# Patient Record
Sex: MALE | Race: WHITE | NOT HISPANIC OR LATINO | Employment: UNEMPLOYED | URBAN - METROPOLITAN AREA
[De-identification: names, ages, dates, MRNs, and addresses within clinical notes are randomized per-mention and may not be internally consistent; named-entity substitution may affect disease eponyms.]

---

## 2018-09-14 ENCOUNTER — OFFICE VISIT (OUTPATIENT)
Dept: INTERNAL MEDICINE | Facility: CLINIC | Age: 38
End: 2018-09-14
Payer: COMMERCIAL

## 2018-09-14 VITALS
BODY MASS INDEX: 43.25 KG/M2 | HEART RATE: 98 BPM | WEIGHT: 315 LBS | DIASTOLIC BLOOD PRESSURE: 72 MMHG | OXYGEN SATURATION: 96 % | SYSTOLIC BLOOD PRESSURE: 110 MMHG

## 2018-09-14 DIAGNOSIS — E66.01 MORBID OBESITY (H): ICD-10-CM

## 2018-09-14 DIAGNOSIS — F10.21 HISTORY OF ALCOHOLISM (H): ICD-10-CM

## 2018-09-14 DIAGNOSIS — Z13.220 LIPID SCREENING: ICD-10-CM

## 2018-09-14 DIAGNOSIS — Z00.00 ROUTINE HISTORY AND PHYSICAL EXAMINATION OF ADULT: Primary | ICD-10-CM

## 2018-09-14 DIAGNOSIS — F41.9 ANXIETY: ICD-10-CM

## 2018-09-14 DIAGNOSIS — E88.810 DYSMETABOLIC SYNDROME X: ICD-10-CM

## 2018-09-14 RX ORDER — HYDROXYZINE HYDROCHLORIDE 50 MG/1
25 TABLET, FILM COATED ORAL
COMMUNITY
End: 2020-12-21

## 2018-09-14 RX ORDER — VENLAFAXINE HYDROCHLORIDE 150 MG/1
150 CAPSULE, EXTENDED RELEASE ORAL
COMMUNITY
Start: 2018-03-21 | End: 2020-12-21

## 2018-09-14 RX ORDER — METFORMIN HYDROCHLORIDE 750 MG/1
750 TABLET, EXTENDED RELEASE ORAL
COMMUNITY
Start: 2017-12-28 | End: 2020-12-21

## 2018-09-14 RX ORDER — LISINOPRIL 40 MG/1
40 TABLET ORAL
Status: ON HOLD | COMMUNITY
Start: 2018-03-28 | End: 2019-04-10

## 2018-09-14 RX ORDER — LORATADINE 10 MG/1
10 TABLET ORAL
COMMUNITY
Start: 2018-07-23 | End: 2020-12-21

## 2018-09-14 ASSESSMENT — PAIN SCALES - GENERAL: PAINLEVEL: NO PAIN (0)

## 2018-09-14 NOTE — PROGRESS NOTES
"[facility] [place]   Kandy Montes De Ocamanibrittany, TONY CNP  09/14/2018      Chief Complaint:   Establish Care and Physical       History of Present Illness:   Girma Trejo is a 38 year old male with a history of alcoholism and polysubstance abuse, OCD, metabolic syndrome, and hypertension, who presents alone for his annual physical as well as establish care. Overall, the patient reports that he is doing well and tolerating his medications well at this time.     Excessive sweating: The patient has noticed increased sweating over the last couple of months, mostly of his chest and head. He appreciates that he is a larger yvette and does work in a hot kitchen though he feels like he is sweating significantly more than those around him. Denies any hx of unintentional weight loss, temperature intolerance, or fevers.    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 He notes that he does get one free meal during each shift, so his diet isn't as healthy as it should be. He also notes that he is up and moving more with this new job in comparison to other jobs that he has had in the past. Over the last year, the patient has become more complacent with his physical activity regimen. He has been cutting down on portion sizes and soda intake. He reports about a 20 pound weight loss over the last 6 months by cutting down on portion sizes, but would like to see a nutritionist for further dietary management.     OCD/anxiety: The patient has been on Effexor for about the last year, experiencing great relief of his panic attacks. Feels it is a almost a \"miracle drug\" for him, as he has been on several medications previously without improvement in his symptoms. He occasionally will attend an AA meeting for support in addition to receiving support from his family and friends. He also will occasionally mediate or listen to helpful [place] Talks. In the past, the patient has not received adequate relief of his " symptoms with therapy. He denies prior suicide attempts. Overall, he is and has been in a good place for a while.     Metabolic syndrome: He is currently taking Metformin 750 mg daily. He denies current type 2 diabetes mellitus diagnosis.     Health Care Maintenance/Other:  1. Routine laboratory studies- indicated at this time   2. Tattoos- majority street, history of HIV and hepatis C screening done in the past  3. Sunscreen- not using often   4. Flu shot- discussed   5. Dental health- due at this time     Review of Systems:   Pertinent items are noted in HPI or as in patient entered ROS below, remainder of complete ROS is negative.    Answers for HPI/ROS submitted by the patient on 9/14/2018   General Symptoms: No  Skin Symptoms: No  HENT Symptoms: No  EYE SYMPTOMS: No  HEART SYMPTOMS: No  LUNG SYMPTOMS: No  INTESTINAL SYMPTOMS: No  URINARY SYMPTOMS: No  REPRODUCTIVE SYMPTOMS: No  SKELETAL SYMPTOMS: No  BLOOD SYMPTOMS: No  NERVOUS SYSTEM SYMPTOMS: No  MENTAL HEALTH SYMPTOMS: No    Active Medications:      ASPIRIN PO, Take 81 mg by mouth daily , Disp: , Rfl:      hydrOXYzine (ATARAX) 50 MG tablet, Take 25 mg by mouth, Disp: , Rfl:      lisinopril (PRINIVIL/ZESTRIL) 40 MG tablet, Take 40 mg by mouth, Disp: , Rfl:      loratadine (CLARITIN) 10 MG tablet, Take 10 mg by mouth, Disp: , Rfl:      metFORMIN (GLUCOPHAGE-XR) 750 MG 24 hr tablet, Take 750 mg by mouth, Disp: , Rfl:      venlafaxine (EFFEXOR-XR) 150 MG 24 hr capsule, Take 150 mg by mouth, Disp: , Rfl:       Allergies:   Review of patient's allergies indicates no known allergies.      Past Medical History:  Alcoholism   Ankle fracture   Anxiety   Chicken pox   Chronic chest pain   Concussion   Hypertension, goal BP <140/90  Major depression, in remission   Morbid obesity   Nasal fracture   Obsessive compulsive disorder (OCD)  Polysubstance (including opioids) dependence, binge pattern   Scalp psoriasis   Right wrist fracture   Falls   Metabolic syndrome      Past Surgical History:  Appendectomy   Nasal fracture repair   Tooth extraction with forceps   PE tubes     Family History:   Depression   Diabetes    Hypertension   Breast cancer   Alcohol/drug abuse   Stroke   Cancer unspecified   Breast cancer      Social History:   The patient is single, a social tobacco smoker (reduced a couple of years ago), an occasional marijuana user, and former methamphetamine user (quit a couple years ago). He occasionally consumes alcohol, anywhere from 2 to 6 beverages at a time. He will usually consume hard liquor or beer.  He recently moved back to the [facility] from [place]. He has been sexually active on and off with female partners using condoms.      Physical Exam:   /72  Pulse 98  Wt (!) 156.9 kg (346 lb)  SpO2 96%  BMI 43.25 kg/m2      Constitutional: no distress, comfortable, pleasant, well-groomed  Eyes: anicteric, conjunctiva pink, normal extra-ocular movements   Ears, Nose and Throat: tympanic membranes pearly gray with positive light reflex, EACs clear bilaterally, nose clear and free of lesions, throat clear, mucosa pink and moist.   Neck: supple with full range of motion, no thyromegaly.   Cardiovascular: regular rate and rhythm, normal S1 and S2, no murmurs, rubs or gallops, peripheral pulses full and symmetric  Respiratory: clear to auscultation with good air movement bilaterally, no wheezes or crackles, non-labored  Gastrointestinal: positive bowel sounds, nontender, no hepatosplenomegaly, no masses. Exam limited due to body habitus.   Musculoskeletal: full range of motion, strength 5/5, no edema   Skin: no concerning lesions or rash, no jaundice, temp normal. Multiple tattoos   Neurological: cranial nerves intact, normal strength and sensation, gait is steady with intact balance, speech is clear, no tremor   Psychological: appropriate mood, demonstrates intact judgment and logical thought process  LYMPH: no axillary, cervical,  supraclavicular, or  "infraclavicular nodes     Assessment and Plan:  Girma Trejo is a 38 year old male with a history of alcoholism and polysubstance abuse, OCD, metabolic syndrome, and hypertension, who presents for his annual physical as well as establish care.    1. Dysmetabolic syndrome X  Patient is currently taking Metformin 750 mg daily. Denies current diagnosis of type 2 diabetes mellitus, was previously told he was \"borderline\". Nutrition referral provided for further discussion on appropriate dietary guidelines to avoid developing diabetes. HbA1c ordered for repeat evaluation. Continue with current dose.  - NUTRITION REFERRAL  - Hemoglobin A1c    2. Morbid obesity (H)  Patient reports that he has lost about 20 pounds over the last 6 months by making small dietary changes. I encouraged making further dietary changes with portion control, cutting back carbohydrates, focusing on lean proteins and vegetable intake. Encouraged making salad and vegetable substitutions for fries and soups when eating at work. Nutrition referral provided for further dietary and weight loss counseling.   - NUTRITION REFERRAL    3. Routine history and physical examination of adult  CMP and lipid panel ordered for routine evaluation as patient has not recently completed laboratory studies. Patient expressed interested in getting his flu shot early. Recommended administration through his local pharmacy, as we do not have this yet in the clinic. Patient will check with local dentist and notify us if he needs a dental health referral.   - Comprehensive metabolic panel  - Lipid panel reflex to direct LDL Fasting      Here for preventive examination   Colorectal screening not indicated     Osteoporosis screening not indicated.     Labs ordered  CMP, Lipid Panel and HbA1C   Counseling was provided in the following areas:     regular exercise    weight management    healthy diet/nutrition    alcohol use     smoking cessation    4. History of alcoholism "   Patient occasionally uses tobacco and alcohol. Due to his history, I recommended trying to stay away from these substances. Encouraged continued attendance to his AA meetings.     5. Anxiety  Well controlled at this time. Patient reports tolerating Effexor very well. Patient has a good support system. Patient is not interested in repeating therapy at this time.       Follow-up: Return in about 6 months (around 3/14/2019).         Scribe Disclosure:  I, Jane Alegre, am serving as a scribe to document services personally performed by TONY Sanford CNP at this visit, based upon the provider's statements to me. All documentation has been reviewed by the aforementioned provider prior to being entered into the official medical record.     Portions of this medical record were completed by a scribe. UPON MY REVIEW AND AUTHENTICATION BY ELECTRONIC SIGNATURE, this confirms (a) I performed the applicable clinical services, and (b) the record is accurate.     TONY Sanford CNP

## 2018-09-14 NOTE — NURSING NOTE
Chief Complaint   Patient presents with     Establish Care     Pt is here to estbalish a new PCP.      Physical     Pt is here for annual physical.      Shruti Ziegler LPN at 3:09 PM on 9/14/2018.

## 2018-09-14 NOTE — PATIENT INSTRUCTIONS
[facility] Medication Refill Request Information:  * Please contact your pharmacy regarding ANY request for medication refills.  ** [facility] Prescription Fax = [phone]  * Please allow 3 business days for routine medication refills.  * Please allow 5 business days for controlled substance medication refills.     [facility] Test Result notification information:  *You will be notified with in 7-10 days of your appointment day regarding the results of your test.  If you are on MyChart you will be notified as soon as the provider has reviewed the results and signed off on them.    [facility]: [phone]     [facility] [phone]

## 2018-09-14 NOTE — MR AVS SNAPSHOT
After Visit Summary   9/14/2018    Girma Trejo    MRN: [id number]           Patient Information     Date Of Birth          1980        Visit Information        Provider Department      9/14/2018 3:15 PM Kandy Sow APRN [facility]        Today's Diagnoses     Routine history and physical examination of adult    -  1    Dysmetabolic syndrome X        Morbid obesity (H)        Lipid screening          Care Instructions    Primary Care Center Medication Refill Request Information:  * Please contact your pharmacy regarding ANY request for medication refills.  ** [facility] Prescription Fax = [phone]  * Please allow 3 business days for routine medication refills.  * Please allow 5 business days for controlled substance medication refills.     Primary Care Center Test Result notification information:  *You will be notified with in 7-10 days of your appointment day regarding the results of your test.  If you are on MyChart you will be notified as soon as the provider has reviewed the results and signed off on them.    Primary Care Center: [phone]     Lab [phone]          Follow-ups after your visit        Additional Services     NUTRITION REFERRAL       Your provider has referred you to: [facility]: [facility] (on call location)  - [place] [phone]   [email or web address]    Please be aware that coverage of these services is subject to the terms and limitations of your health insurance plan.  Call member services at your health plan with any benefit or coverage questions.      Please bring the following with you to your appointment:    (1) This referral request  (2) Any documents given to you regarding this referral  (3) Any specific questions you have about diet and/or food choices                  Follow-up notes from your care team     Return in about 6 months (around 3/14/2019).      Future tests that were ordered  for you today     Open Future Orders        Priority Expected Expires Ordered    Comprehensive metabolic panel Routine 2018    Lipid panel reflex to direct LDL Fasting Routine 2018 10/31/2018 2018    Hemoglobin A1c Routine 2018 10/31/2018 2018            Who to contact     Please call your clinic at [phone] to:    Ask questions about your health    Make or cancel appointments    Discuss your medicines    Learn about your test results    Speak to your doctor            Additional Information About Your Visit        [employer] Information     [email or web address] is an electronic gateway that provides easy, online access to your medical records. With Yummy77, you can request a clinic appointment, read your test results, renew a prescription or communicate with your care team.     To sign up for Yummy77 visit the website at [email or web address]   You will be asked to enter the access code listed below, as well as some personal information. Please follow the directions to create your username and password.     Your access code is: [email or web address]  Expires: 2018  6:31 AM     Your access code will  in 90 days. If you need help or a new code, please contact your [facility] or call [phone] for assistance.        Care EveryWhere ID     This is your Care EveryWhere ID. This could be used by other organizations to access your [place] medical records  [id number]        Your Vitals Were     Pulse Pulse Oximetry BMI (Body Mass Index)             98 96% 43.25 kg/m2          Blood Pressure from Last 3 Encounters:   18 110/72   14 129/76   14 136/80    Weight from Last 3 Encounters:   18 (!) 156.9 kg (346 lb)   14 (!) 149.5 kg (329 lb 7.6 oz)   14 (!) 151.2 kg (333 lb 6.1 oz)              We Performed the Following     NUTRITION REFERRAL          Today's Medication Changes          These changes are accurate  as of 9/14/18  3:46 PM.  If you have any questions, ask your nurse or doctor.               These medicines have changed or have updated prescriptions.        Dose/Directions    metFORMIN 750 MG 24 hr tablet   Commonly known as:  GLUCOPHAGE-XR   This may have changed:  Another medication with the same name was removed. Continue taking this medication, and follow the directions you see here.   Changed by:  Kandy Sow APRN CNP        Dose:  750 mg   Take 750 mg by mouth   Refills:  0         Stop taking these medicines if you haven't already. Please contact your care team if you have questions.     citalopram 10 MG tablet   Commonly known as:  celeXA   Stopped by:  Kandy Sow APRN CNP           cyanocobalamin 1000 MCG Tabs   Stopped by:  Kandy Sow APRN CNP           PRAVASTATIN SODIUM PO   Stopped by:  Kandy Sow APRN CNP                    Primary Care Provider Office Phone # Fax #    TONY Miller [phone]       [address] [facility] [address]        Equal Access to Services     [facility]: Hadii stephan ku hadasho Sojeannette, waaxda luqadaha, qaybta kaalmada adesudarshan, tatiana urbano . So [facility] [phone].    ATENCIÓN: Si habla español, tiene a avila disposición servicios gratuitos de asistencia lingüística. [facility] [phone].    We comply with applicable federal civil rights laws and [place] laws. We do not discriminate on the basis of race, color, national origin, age, disability, sex, sexual orientation, or gender identity.            Thank you!     Thank you for choosing [facility]  for your care. Our goal is always to provide you with excellent care. Hearing back from our patients is one way we can continue to improve our services. Please take a few minutes to complete the written survey that you may receive in the mail after your visit with us. Thank you!             Your  Updated Medication List - Protect others around you: Learn how to safely use, store and throw away your medicines at www.disposemymeds.org.          This list is accurate as of 9/14/18  3:46 PM.  Always use your most recent med list.                   Brand Name Dispense Instructions for use Diagnosis    ASPIRIN PO      Take 81 mg by mouth daily        hydrOXYzine 50 MG tablet    ATARAX     Take 25 mg by mouth        lisinopril 40 MG tablet    PRINIVIL/ZESTRIL     Take 40 mg by mouth        loratadine 10 MG tablet    CLARITIN     Take 10 mg by mouth        metFORMIN 750 MG 24 hr tablet    GLUCOPHAGE-XR     Take 750 mg by mouth        venlafaxine 150 MG 24 hr capsule    EFFEXOR-XR     Take 150 mg by mouth

## 2019-02-14 ENCOUNTER — OFFICE VISIT (OUTPATIENT)
Dept: OPHTHALMOLOGY | Facility: CLINIC | Age: 39
End: 2019-02-14
Attending: OPHTHALMOLOGY
Payer: COMMERCIAL

## 2019-02-14 DIAGNOSIS — H52.13 MYOPIA OF BOTH EYES WITH ASTIGMATISM: ICD-10-CM

## 2019-02-14 DIAGNOSIS — E11.9 DIABETES MELLITUS TYPE 2 WITHOUT RETINOPATHY (H): Primary | ICD-10-CM

## 2019-02-14 DIAGNOSIS — H52.203 MYOPIA OF BOTH EYES WITH ASTIGMATISM: ICD-10-CM

## 2019-02-14 PROCEDURE — G0463 HOSPITAL OUTPT CLINIC VISIT: HCPCS | Mod: ZF

## 2019-02-14 PROCEDURE — 92015 DETERMINE REFRACTIVE STATE: CPT | Mod: ZF

## 2019-02-14 ASSESSMENT — REFRACTION_MANIFEST
OS_CYLINDER: +0.75
OD_CYLINDER: +0.50
OS_SPHERE: -0.75
OD_SPHERE: -0.50
OS_AXIS: 120
OD_AXIS: 070

## 2019-02-14 ASSESSMENT — EXTERNAL EXAM - LEFT EYE: OS_EXAM: NORMAL

## 2019-02-14 ASSESSMENT — VISUAL ACUITY
OS_SC: 20/40
METHOD: SNELLEN - LINEAR
OD_SC: 20/20
OD_SC+: -1
OS_PH_SC: 20/25
OS_PH_SC+: +3

## 2019-02-14 ASSESSMENT — SLIT LAMP EXAM - LIDS
COMMENTS: NORMAL
COMMENTS: NORMAL

## 2019-02-14 ASSESSMENT — CUP TO DISC RATIO
OS_RATIO: 0.3
OD_RATIO: 0.3

## 2019-02-14 ASSESSMENT — TONOMETRY
OS_IOP_MMHG: 15
IOP_METHOD: TONOPEN
OD_IOP_MMHG: 16

## 2019-02-14 ASSESSMENT — CONF VISUAL FIELD
OD_NORMAL: 1
METHOD: COUNTING FINGERS
OS_NORMAL: 1

## 2019-02-14 ASSESSMENT — EXTERNAL EXAM - RIGHT EYE: OD_EXAM: NORMAL

## 2019-02-14 NOTE — PROGRESS NOTES
"CC: CEE    HPI: Girma Trejo is a 38 year old male who presents for CEE. Last eye exam was 2yrs ago. Previously had glasses but lost them. Notes eye fatigue with computer use and reading. Has recent diagnosis of DM2.     PMH:  DM2  HTN on meds  INDIO on CPAP  POHx:  Mild myopia  Current Eye Medications:   None  FH:  Paternal grandfather - \"eye cancer\"  Dad - ?glacuoma  SH:  Former smoker    Assessment & Plan   Girma Trejo is a 38 year old male with the following diagnoses:   1. Diabetes mellitus type 2 without retinopathy (H)    2. Myopia of both eyes with astigmatism       DM2 without retinopathy  -Diagnosed with T2DM in 2014, then lost weight and Diabetes mellitus went away, recently regained weight and was rediagnosed  -On metformin  -Most recent A1c 7.5 (2/11/19)  -BP/BG control  -Annual dilated eye exam    Myopia/astigmatism  -Mild  -MRx provided per patient request    RTC: 1 year CEE, sooner prn    Lorin Pinto MD   PGY-4 Ophthalmology    Teaching statement:  Complete documentation of historical and exam elements from today's encounter can be found in the full encounter summary report (not reduplicated in this progress note). I personally obtained the chief complaint(s) and history of present illness.  I confirmed and edited as necessary the review of systems, past medical/surgical history, family history, social history, and examination findings as documented by others; and I examined the patient myself. I personally reviewed the relevant tests, images, and reports as documented above.     I formulated and edited as necessary the assessment and plan and discussed the findings and management plan with the patient and family.    Kalina Bee MD  Comprehensive Ophthalmology & Ocular Pathology  Department of Ophthalmology and Visual Neurosciences  [email or web address]  Pager [phone]    "

## 2019-02-14 NOTE — NURSING NOTE
Chief Complaints and History of Present Illnesses   Patient presents with     New Patient     Chief Complaint(s) and History of Present Illness(es)     New Patient     Context: near vision    Pain scale: 0/10              Comments     New patient is here for a comprehensive eye exam.    The patient had eyeglasses two years ago.  The patient notes eye fatigue with computer and near vision.  CARLENE Stern 1:39 PM 02/14/2019

## 2019-04-04 ENCOUNTER — HOSPITAL ENCOUNTER (INPATIENT)
Facility: CLINIC | Age: 39
LOS: 4 days | Discharge: HOME | DRG: 982 | End: 2019-04-10
Attending: FAMILY MEDICINE | Admitting: EMERGENCY MEDICINE
Payer: COMMERCIAL

## 2019-04-04 ENCOUNTER — APPOINTMENT (OUTPATIENT)
Dept: GENERAL RADIOLOGY | Facility: CLINIC | Age: 39
DRG: 982 | End: 2019-04-04
Attending: FAMILY MEDICINE
Payer: COMMERCIAL

## 2019-04-04 DIAGNOSIS — L03.012 CELLULITIS OF FINGER OF LEFT HAND: ICD-10-CM

## 2019-04-04 DIAGNOSIS — I89.1 LYMPHANGITIS: ICD-10-CM

## 2019-04-04 DIAGNOSIS — L03.90 CELLULITIS, UNSPECIFIED CELLULITIS SITE: Primary | ICD-10-CM

## 2019-04-04 PROBLEM — S69.90XA FINGER INJURY: Status: ACTIVE | Noted: 2019-04-04

## 2019-04-04 LAB
ALBUMIN SERPL-MCNC: 3.9 G/DL (ref 3.4–5)
ALP SERPL-CCNC: 55 U/L (ref 40–150)
ALT SERPL W P-5'-P-CCNC: 56 U/L (ref 0–70)
ANION GAP SERPL CALCULATED.3IONS-SCNC: 12 MMOL/L (ref 3–14)
AST SERPL W P-5'-P-CCNC: 28 U/L (ref 0–45)
BASOPHILS # BLD AUTO: 0 10E9/L (ref 0–0.2)
BASOPHILS NFR BLD AUTO: 0.2 %
BILIRUB DIRECT SERPL-MCNC: 0.3 MG/DL (ref 0–0.2)
BILIRUB SERPL-MCNC: 1.5 MG/DL (ref 0.2–1.3)
BUN SERPL-MCNC: 22 MG/DL (ref 7–30)
CALCIUM SERPL-MCNC: 8.7 MG/DL (ref 8.5–10.1)
CHLORIDE SERPL-SCNC: 98 MMOL/L (ref 94–109)
CO2 SERPL-SCNC: 22 MMOL/L (ref 20–32)
CREAT SERPL-MCNC: 1.09 MG/DL (ref 0.66–1.25)
DIFFERENTIAL METHOD BLD: ABNORMAL
EOSINOPHIL # BLD AUTO: 0 10E9/L (ref 0–0.7)
EOSINOPHIL NFR BLD AUTO: 0.2 %
ERYTHROCYTE [DISTWIDTH] IN BLOOD BY AUTOMATED COUNT: 12.2 % (ref 10–15)
GFR SERPL CREATININE-BSD FRML MDRD: 85 ML/MIN/{1.73_M2}
GLUCOSE BLDC GLUCOMTR-MCNC: 223 MG/DL (ref 70–99)
GLUCOSE SERPL-MCNC: 151 MG/DL (ref 70–99)
HCT VFR BLD AUTO: 38.7 % (ref 40–53)
HGB BLD-MCNC: 13.2 G/DL (ref 13.3–17.7)
IMM GRANULOCYTES # BLD: 0 10E9/L (ref 0–0.4)
IMM GRANULOCYTES NFR BLD: 0.3 %
LACTATE BLD-SCNC: 1.7 MMOL/L (ref 0.7–2)
LYMPHOCYTES # BLD AUTO: 0.5 10E9/L (ref 0.8–5.3)
LYMPHOCYTES NFR BLD AUTO: 4.5 %
MCH RBC QN AUTO: 31.6 PG (ref 26.5–33)
MCHC RBC AUTO-ENTMCNC: 34.1 G/DL (ref 31.5–36.5)
MCV RBC AUTO: 93 FL (ref 78–100)
MONOCYTES # BLD AUTO: 0.7 10E9/L (ref 0–1.3)
MONOCYTES NFR BLD AUTO: 5.9 %
NEUTROPHILS # BLD AUTO: 10.6 10E9/L (ref 1.6–8.3)
NEUTROPHILS NFR BLD AUTO: 88.9 %
NRBC # BLD AUTO: 0 10*3/UL
NRBC BLD AUTO-RTO: 0 /100
PLATELET # BLD AUTO: 157 10E9/L (ref 150–450)
POTASSIUM SERPL-SCNC: 3.8 MMOL/L (ref 3.4–5.3)
PROT SERPL-MCNC: 7.1 G/DL (ref 6.8–8.8)
RBC # BLD AUTO: 4.18 10E12/L (ref 4.4–5.9)
SODIUM SERPL-SCNC: 132 MMOL/L (ref 133–144)
WBC # BLD AUTO: 11.9 10E9/L (ref 4–11)

## 2019-04-04 PROCEDURE — 00000146 ZZHCL STATISTIC GLUCOSE BY METER IP

## 2019-04-04 PROCEDURE — 96376 TX/PRO/DX INJ SAME DRUG ADON: CPT

## 2019-04-04 PROCEDURE — 99285 EMERGENCY DEPT VISIT HI MDM: CPT | Mod: 25

## 2019-04-04 PROCEDURE — 80076 HEPATIC FUNCTION PANEL: CPT | Performed by: EMERGENCY MEDICINE

## 2019-04-04 PROCEDURE — 25000132 ZZH RX MED GY IP 250 OP 250 PS 637: Performed by: NURSE PRACTITIONER

## 2019-04-04 PROCEDURE — 25800030 ZZH RX IP 258 OP 636: Performed by: NURSE PRACTITIONER

## 2019-04-04 PROCEDURE — G0378 HOSPITAL OBSERVATION PER HR: HCPCS

## 2019-04-04 PROCEDURE — 25000128 H RX IP 250 OP 636: Performed by: FAMILY MEDICINE

## 2019-04-04 PROCEDURE — 83605 ASSAY OF LACTIC ACID: CPT | Performed by: EMERGENCY MEDICINE

## 2019-04-04 PROCEDURE — 96361 HYDRATE IV INFUSION ADD-ON: CPT

## 2019-04-04 PROCEDURE — 73140 X-RAY EXAM OF FINGER(S): CPT | Mod: LT

## 2019-04-04 PROCEDURE — 25800030 ZZH RX IP 258 OP 636

## 2019-04-04 PROCEDURE — 96375 TX/PRO/DX INJ NEW DRUG ADDON: CPT

## 2019-04-04 PROCEDURE — 25000128 H RX IP 250 OP 636: Performed by: NURSE PRACTITIONER

## 2019-04-04 PROCEDURE — 96367 TX/PROPH/DG ADDL SEQ IV INF: CPT

## 2019-04-04 PROCEDURE — 85025 COMPLETE CBC W/AUTO DIFF WBC: CPT | Performed by: EMERGENCY MEDICINE

## 2019-04-04 PROCEDURE — 99285 EMERGENCY DEPT VISIT HI MDM: CPT | Mod: Z6 | Performed by: FAMILY MEDICINE

## 2019-04-04 PROCEDURE — 87040 BLOOD CULTURE FOR BACTERIA: CPT | Performed by: EMERGENCY MEDICINE

## 2019-04-04 PROCEDURE — 25000132 ZZH RX MED GY IP 250 OP 250 PS 637: Performed by: FAMILY MEDICINE

## 2019-04-04 PROCEDURE — 25000128 H RX IP 250 OP 636

## 2019-04-04 PROCEDURE — 96365 THER/PROPH/DIAG IV INF INIT: CPT

## 2019-04-04 PROCEDURE — 25000128 H RX IP 250 OP 636: Performed by: EMERGENCY MEDICINE

## 2019-04-04 PROCEDURE — 0X9K0ZZ DRAINAGE OF LEFT HAND, OPEN APPROACH: ICD-10-PCS | Performed by: ORTHOPAEDIC SURGERY

## 2019-04-04 PROCEDURE — 80048 BASIC METABOLIC PNL TOTAL CA: CPT | Performed by: EMERGENCY MEDICINE

## 2019-04-04 PROCEDURE — 96366 THER/PROPH/DIAG IV INF ADDON: CPT

## 2019-04-04 RX ORDER — HYDROMORPHONE HYDROCHLORIDE 1 MG/ML
0.5 INJECTION, SOLUTION INTRAMUSCULAR; INTRAVENOUS; SUBCUTANEOUS ONCE
Status: COMPLETED | OUTPATIENT
Start: 2019-04-04 | End: 2019-04-04

## 2019-04-04 RX ORDER — PIPERACILLIN SODIUM, TAZOBACTAM SODIUM 3; .375 G/15ML; G/15ML
3.38 INJECTION, POWDER, LYOPHILIZED, FOR SOLUTION INTRAVENOUS ONCE
Status: COMPLETED | OUTPATIENT
Start: 2019-04-04 | End: 2019-04-04

## 2019-04-04 RX ORDER — ONDANSETRON 4 MG/1
4 TABLET, ORALLY DISINTEGRATING ORAL EVERY 6 HOURS PRN
Status: DISCONTINUED | OUTPATIENT
Start: 2019-04-04 | End: 2019-04-10 | Stop reason: HOSPADM

## 2019-04-04 RX ORDER — OXYCODONE HYDROCHLORIDE 10 MG/1
10 TABLET ORAL ONCE
Status: COMPLETED | OUTPATIENT
Start: 2019-04-04 | End: 2019-04-04

## 2019-04-04 RX ORDER — LIDOCAINE HYDROCHLORIDE 10 MG/ML
INJECTION, SOLUTION EPIDURAL; INFILTRATION; INTRACAUDAL; PERINEURAL
Status: DISCONTINUED
Start: 2019-04-04 | End: 2019-04-04 | Stop reason: HOSPADM

## 2019-04-04 RX ORDER — ONDANSETRON 2 MG/ML
4 INJECTION INTRAMUSCULAR; INTRAVENOUS EVERY 6 HOURS PRN
Status: DISCONTINUED | OUTPATIENT
Start: 2019-04-04 | End: 2019-04-10 | Stop reason: HOSPADM

## 2019-04-04 RX ORDER — METFORMIN HYDROCHLORIDE 750 MG/1
750 TABLET, EXTENDED RELEASE ORAL
Status: DISCONTINUED | OUTPATIENT
Start: 2019-04-05 | End: 2019-04-10 | Stop reason: HOSPADM

## 2019-04-04 RX ORDER — VENLAFAXINE HYDROCHLORIDE 150 MG/1
150 CAPSULE, EXTENDED RELEASE ORAL
Status: DISCONTINUED | OUTPATIENT
Start: 2019-04-05 | End: 2019-04-10 | Stop reason: HOSPADM

## 2019-04-04 RX ORDER — PIPERACILLIN SODIUM, TAZOBACTAM SODIUM 3; .375 G/15ML; G/15ML
3.38 INJECTION, POWDER, LYOPHILIZED, FOR SOLUTION INTRAVENOUS ONCE
Status: COMPLETED | OUTPATIENT
Start: 2019-04-05 | End: 2019-04-05

## 2019-04-04 RX ORDER — LISINOPRIL 20 MG/1
40 TABLET ORAL DAILY
Status: DISCONTINUED | OUTPATIENT
Start: 2019-04-05 | End: 2019-04-10 | Stop reason: HOSPADM

## 2019-04-04 RX ORDER — HYDROMORPHONE HYDROCHLORIDE 1 MG/ML
0.5 INJECTION, SOLUTION INTRAMUSCULAR; INTRAVENOUS; SUBCUTANEOUS
Status: DISCONTINUED | OUTPATIENT
Start: 2019-04-04 | End: 2019-04-05

## 2019-04-04 RX ORDER — ACETAMINOPHEN 325 MG/1
650 TABLET ORAL EVERY 4 HOURS PRN
Status: DISCONTINUED | OUTPATIENT
Start: 2019-04-04 | End: 2019-04-10 | Stop reason: HOSPADM

## 2019-04-04 RX ORDER — ACETAMINOPHEN 500 MG
1000 TABLET ORAL ONCE
Status: COMPLETED | OUTPATIENT
Start: 2019-04-04 | End: 2019-04-04

## 2019-04-04 RX ORDER — ACETAMINOPHEN 650 MG/1
650 SUPPOSITORY RECTAL EVERY 4 HOURS PRN
Status: DISCONTINUED | OUTPATIENT
Start: 2019-04-04 | End: 2019-04-10 | Stop reason: HOSPADM

## 2019-04-04 RX ORDER — NALOXONE HYDROCHLORIDE 0.4 MG/ML
.1-.4 INJECTION, SOLUTION INTRAMUSCULAR; INTRAVENOUS; SUBCUTANEOUS
Status: DISCONTINUED | OUTPATIENT
Start: 2019-04-04 | End: 2019-04-10 | Stop reason: HOSPADM

## 2019-04-04 RX ORDER — DEXTROSE MONOHYDRATE 25 G/50ML
25-50 INJECTION, SOLUTION INTRAVENOUS
Status: DISCONTINUED | OUTPATIENT
Start: 2019-04-04 | End: 2019-04-07

## 2019-04-04 RX ORDER — SODIUM CHLORIDE 9 MG/ML
INJECTION, SOLUTION INTRAVENOUS CONTINUOUS
Status: DISCONTINUED | OUTPATIENT
Start: 2019-04-04 | End: 2019-04-09

## 2019-04-04 RX ORDER — NICOTINE POLACRILEX 4 MG
15-30 LOZENGE BUCCAL
Status: DISCONTINUED | OUTPATIENT
Start: 2019-04-04 | End: 2019-04-07

## 2019-04-04 RX ORDER — HYDROMORPHONE HYDROCHLORIDE 1 MG/ML
0.5 INJECTION, SOLUTION INTRAMUSCULAR; INTRAVENOUS; SUBCUTANEOUS
Status: COMPLETED | OUTPATIENT
Start: 2019-04-04 | End: 2019-04-04

## 2019-04-04 RX ORDER — LIDOCAINE 40 MG/G
CREAM TOPICAL
Status: DISCONTINUED | OUTPATIENT
Start: 2019-04-04 | End: 2019-04-10 | Stop reason: HOSPADM

## 2019-04-04 RX ADMIN — OXYCODONE HYDROCHLORIDE 10 MG: 10 TABLET ORAL at 23:13

## 2019-04-04 RX ADMIN — ACETAMINOPHEN 650 MG: 325 TABLET, FILM COATED ORAL at 23:13

## 2019-04-04 RX ADMIN — Medication 0.5 MG: at 23:07

## 2019-04-04 RX ADMIN — Medication 0.5 MG: at 16:36

## 2019-04-04 RX ADMIN — ACETAMINOPHEN 1000 MG: 500 TABLET, FILM COATED ORAL at 14:01

## 2019-04-04 RX ADMIN — Medication 0.5 MG: at 17:58

## 2019-04-04 RX ADMIN — SODIUM CHLORIDE: 9 INJECTION, SOLUTION INTRAVENOUS at 23:11

## 2019-04-04 RX ADMIN — Medication 0.5 MG: at 21:04

## 2019-04-04 RX ADMIN — Medication 0.5 MG: at 13:59

## 2019-04-04 RX ADMIN — PIPERACILLIN SODIUM AND TAZOBACTAM SODIUM 3.38 G: 3; .375 INJECTION, POWDER, LYOPHILIZED, FOR SOLUTION INTRAVENOUS at 23:17

## 2019-04-04 RX ADMIN — VANCOMYCIN HYDROCHLORIDE 2000 MG: 1 INJECTION, POWDER, LYOPHILIZED, FOR SOLUTION INTRAVENOUS at 14:40

## 2019-04-04 RX ADMIN — PIPERACILLIN SODIUM AND TAZOBACTAM SODIUM 3.38 G: 3; .375 INJECTION, POWDER, LYOPHILIZED, FOR SOLUTION INTRAVENOUS at 13:59

## 2019-04-04 RX ADMIN — SODIUM CHLORIDE 1000 ML: 9 INJECTION, SOLUTION INTRAVENOUS at 13:59

## 2019-04-04 ASSESSMENT — MIFFLIN-ST. JEOR: SCORE: 2372.76

## 2019-04-04 NOTE — LETTER
Transition Communication Hand-off for Care Transitions to Next Level of Care Provider    Name: Girma Trejo  : 1980  MRN #: [id number]  Primary Care Provider: [facility]     Primary Clinic: [address] [facility] [place] [address]     Reason for Hospitalization:  Lymphangitis [I89.1]  Cellulitis of finger of left hand [L03.012]  Admit Date/Time: 2019  1:20 PM  Discharge Date: 4/10/2019  Payor Source: Payor: [employer] / Plan: [employer] [place] / Product Type: HMO /     Readmission Assessment Measure (MANDY) Risk Score/category: 7/Medium         Reason for Communication Hand-off Referral: Multiple providers/specialties    Discharge Plan:  Follow up with primary care provider at [facility], within 5 days for hospital follow- up.  No follow up labs or test are needed.       Follow up with ortho if your finger isn't still improving 1 wk from discharge.               Concern for non-adherence with plan of care:   No    Follow-up specialty is recommended: Yes    Follow-up plan:  No future appointments.    Jenny North, RNCC, BSN    [facility]    Medicine Group  [address] [place] [address]    [email or web address]  [email or web address]    Office: [phone] Pager: [phone]  To contact weekend RNCC, dial * * [phone] and enter pager number [phone] at prompt. This pager can not be contacted by text page or outside line.          AVS/Discharge Summary is the source of truth; this is a helpful guide for improved communication of patient story

## 2019-04-04 NOTE — PHARMACY-VANCOMYCIN DOSING SERVICE
Pharmacy Vancomycin Initial Note  Date of Service 2019  Patient's  1980  38 year old, male    Indication: Skin and Soft Tissue Infection    Current estimated CrCl = CrCl cannot be calculated (Patient's most recent lab result is older than the maximum 90 days allowed.).    Creatinine for last 3 days  No results found for requested labs within last 72 hours.    Recent Vancomycin Level(s) for last 3 days  No results found for requested labs within last 72 hours.      Vancomycin IV Administrations (past 72 hours)      No vancomycin orders with administrations in past 72 hours.                Nephrotoxins and other renal medications (From now, onward)    Start     Dose/Rate Route Frequency Ordered Stop    19 1346  vancomycin (VANCOCIN) 2,000 mg in sodium chloride 0.9 % 500 mL intermittent infusion      2,000 mg  over 2 Hours Intravenous ONCE 19 1346      19 1337  piperacillin-tazobactam (ZOSYN) 3.375 g vial to attach to  mL bag      3.375 g  over 30 Minutes Intravenous ONCE 19 1336            Contrast Orders - past 72 hours (72h ago, onward)    None                Plan:  1.  Start vancomycin  2000 mg (~14mg/kg actual body weight; ~20 mg/kg adjusted body weigh of 101.9 kg) IV q12h.   2.  Goal Trough Level: 15-20 mg/L   3.  Pharmacy will check trough levels as appropriate in 1-3 Days.    4. Serum creatinine levels will be ordered daily for the first week of therapy and at least twice weekly for subsequent weeks.    5. [place] method utilized to dose vancomycin therapy: Method 2    Jeremi ByrdD  PGY2 Infectious Diseases Pharmacy Resident  Pager: [phone]]

## 2019-04-04 NOTE — CONSULTS
[facility], Orthopaedic Surgery Consultation    Girma Trejo MRN# [id number]   Age: 38 year old YOB: 1980     Date of Admission:  4/4/2019    Reason for consult: LEFT hand cellulitis vs flexor tenosynovitis       Requesting physician: Dr. Simon Reagan         Assessment and Plan:   Assessment:  38 year old male w/ PMH homelessness, DM, HTN, polysubstance use presenting w/ LEFT hand cellulitis on whom ortho is consulted to rule out flexor tenosynovitis of the ring finger.    On exam the patient absolutely does have cellulitis. His exam is equivocal for additional flexor tenosynovitis pathology. Will therefore trial IV ABx and re-evaluate.    Procedure: Bedside incision and drainage  Verbal consent obtained. Patient anesthetized with digital block using lidocaine. 4mm incision over dorsal L 4th finger over the middle phalanx. Bloody output was encountered, however no gross purulence was observed. Patient tolerated the procedure well.    Plan:  Medicine primary, [facility] cares  Patient receiving IV Vanc/Zosyn  Will reeval patient in AM to see if he has improvement w/ IV ABx  Most likely patient just has cellulitis with no flexor tenosynovitis  However, will have patient NPO at midnight in case his exam is worse in the AM requiring OR.    Patient discussed with Dr. Jose Juan Bui.    Wilber Hodge MD  Orthopaedic Surgery, PGY-1  Pager: [phone]            History of Present Illness:   Pt is a 38 year old w/ PMH homelessness, DM, HTN, polysubstance use presenting w/ LEFT hand cellulitis on whom ortho is consulted to rule out flexor tenosynovitis.    The patient accidentally pierced the dorsum of his left ring finger yesterday afternoon with a splinter from a piece of wood from his desk.  He attempted to remove the splinter with tweezers; he may have successfully gotten some more all of the splinter out but he is not sure.    He subsequently developed redness and pain in the left hand overnight  which awoke him from sleep.  He presented to the emergency department today for this reason.    Endorses malaise, fatigue, and dizziness.  Says he feels clammy.  Denies fevers, nausea, vomiting, abdominal pain, chest pain, shortness of breath, headache.            Past Medical History:     Past Medical History:   Diagnosis Date     Alcoholism (H)     treatment multiple times, most recently 2016     Ankle fracture as child     Anxiety      Chicken pox      Chronic chest pain     wall pain vs anxiety, several ER visits and stress test negative     Concussion 2007     Diabetes (H)      Hypertension, goal below 140/90      Major depression in remission (H) age 10    no suicide attempts, [facility], [facility]     Marijuana smoker     in [place]     Morbid obesity (H)      Nasal fracture      OCD (obsessive compulsive disorder)      Polysubstance (excluding opioids) dependence, binge pattern (H)     Prior meth abuse--treatment in 2016     Psoriasis of scalp      Smoker     1/3 ppd     Wrist fracture as child    right             Past Surgical History:     Past Surgical History:   Procedure Laterality Date     APPENDECTOMY       ENT SURGERY      nasal fx     HC TOOTH EXTRACTION W/FORCEP       PE TUBES      as a child             Social History:     Social History     Socioeconomic History     Marital status: Single     Spouse name: None     Number of children: None     Years of education: None     Highest education level: None   Occupational History     None   Social Needs     Financial resource strain: None     Food insecurity:     Worry: None     Inability: None     Transportation needs:     Medical: None     Non-medical: None   Tobacco Use     Smoking status: Former Smoker     Packs/day: 0.10     Years: 15.00     Pack years: 1.50     Smokeless tobacco: Never Used     Tobacco comment: socially, mostly quit in 2016   Substance and Sexual Activity     Alcohol use: Yes     Comment: Occasional--about 1x per week. 2-6 drinks at a time      Drug use: Yes     Types: Marijuana     Comment: occasional marijuana--once every 3 or 4 months, prior meth abuse     Sexual activity: Not Currently     Partners: Female     Birth control/protection: Condom   Lifestyle     Physical activity:     Days per week: None     Minutes per session: None     Stress: None   Relationships     Social connections:     Talks on phone: None     Gets together: None     Attends Mu-ism service: None     Active member of club or organization: None     Attends meetings of clubs or organizations: None     Relationship status: None     Intimate partner violence:     Fear of current or ex partner: None     Emotionally abused: None     Physically abused: None     Forced sexual activity: None   Other Topics Concern     Parent/sibling w/ CABG, MI or angioplasty before 65F 55M? 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 No family hx of      Cerebrovascular Disease No family hx of      Cancer - colorectal No family hx of      Prostate Cancer No family hx of      Allergies No family hx of      Alzheimer Disease No family hx of      Anesthesia Reaction No family hx of      Arthritis No family hx of      Blood Disease No family hx of      Circulatory No family hx of      Congenital Anomalies No family hx of      Connective Tissue Disorder No family hx of      Endocrine Disease No family hx of      Eye Disorder No family hx of      Gastrointestinal Disease No family hx of      Genitourinary Problems No family hx of       "Gynecology No family hx of      Lipids No family hx of      Musculoskeletal Disorder No family hx of      Neurologic Disorder No family hx of      Obesity No family hx of      Osteoporosis No family hx of      Respiratory No family hx of      Thyroid Disease No family hx of               Medications:     Current Facility-Administered Medications   Medication     [START ON 4/5/2019] vancomycin (VANCOCIN) 2,000 mg in sodium chloride 0.9 % 500 mL intermittent infusion     Current Outpatient Medications   Medication Sig     ASPIRIN PO Take 81 mg by mouth daily      hydrOXYzine (ATARAX) 50 MG tablet Take 25 mg by mouth     loratadine (CLARITIN) 10 MG tablet Take 10 mg by mouth     metFORMIN (GLUCOPHAGE-XR) 750 MG 24 hr tablet Take 750 mg by mouth     venlafaxine (EFFEXOR-XR) 150 MG 24 hr capsule Take 150 mg by mouth             Allergies:      Allergies   Allergen Reactions     No Clinical Screening - See Comments      Other reaction(s): Runny Nose     Vilazodone Other (See Comments)     Blurred vision, racing heart            Review of Systems:   Per HPI           Physical Exam:   COMPLETE EXAMINATION:   VITAL SIGNS: /63   Pulse 96   Temp 99  F (37.2  C) (Oral)   Resp 20   Ht 1.778 m (5' 10\")   Wt 145.2 kg (320 lb)   SpO2 98%   BMI 45.92 kg/m    RESP: Non labored breathing on RA  CV: Extr wwp  MUSCULOSKELETAL:  Focal examination of the left upper extremity demonstrates an erythematous and swollen and on inspection.  There is a break in the skin over the dorsum of the middle phalanx from the splinter.  The left ring finger is swollen from approximately the middle phalanx and proximally to the dorsum of the hand all the way to the carpus.  The patient is markedly tender to palpation over the finger and NTTP over the hands.  His tenderness is worse on the volar aspect of his ring finger.  His volar aspect of his finger is full from the distal phalanx to proximal phalanx.  The dorsum of his finger is a little " bit softer.  NTTP over all other fingers.  Fires EPL, FPL, intrinsics.  SILT over median, radial, ulnar nerve distributions.  2+ palpable radial pulse.             Data:   All pertinent laboratory data reviewed  All imaging studies reviewed by me.    Recent Labs   Lab Test 04/04/19  1325 06/19/14  1517 05/23/14  0713   HGB 13.2* 14.9 14.7   WBC 11.9* 8.0 4.4     No results for input(s): FTYP, FNEU, FOTH, FCOL, FAPR, FWBC in the last 94418 hours.    Imaging:  Fingers Xr, 2-3 Views, Left    Result Date: 4/4/2019  Exam: XR FINGER LT G/E 2 VW, 4/4/2019 2:31 PM Indication: ? fb dorsum left 4th finger with now cellulitis Comparison: None Findings: 3 radiographic views focused on the fourth digit. No displaced fracture. No substantial degenerative changes. No radiopaque body. Soft tissue swelling of the fourth digit.     Impression: Soft tissue swelling of the fourth digit. No radiopaque foreign body.    Signed:    Patient discussed with staff physician, Dr. Jose Juan Bui.    Wilber Hodge MD  Orthopaedic Surgery, PGY-1  Pager: [phone]    Attending MD (Dr. Jaycob Buckley) Attestation: I reviewed the resident's note and agree with the documented findings and plan of care.      MD Ruperto Otoole Family Professor  Oncology and Adult Reconstructive Surgery  Dept Orthopaedic Surgery, [facility]  [phone] office, [phone] pager  [email or web address]

## 2019-04-04 NOTE — ED PROVIDER NOTES
karlie    [place] EMERGENCY DEPARTMENT ([facility])  4/04/19 [facility] 1:30 PM   History     Chief Complaint   Patient presents with     finger problem     The history is provided by the patient and medical records.     Girma Trejo is a 38 year old male who presents with red, swollen, and painful left ring finger for the past few days.  He has a history of type 2 diabetes, metabolic syndrome and hypertension.  Patient states symptoms after he got a a splinter in his finger, possibly from his desk. It was in his finger for a few days and developed a small [place] of redness. The finger became profoundly swollen, red and severely painful. He is no longer able to bend his finger. The redness has spread up his arm. He notes feeling sick this morning with diaphoresis. He wonders if he could have some water. He feels panicky at this time. No known drug allergies.     Per [facility] records, his last Tdap was in 2014.    I have reviewed the Medications, Allergies, Past Medical and Surgical History, and Social History in the [employer] system.  PAST MEDICAL HISTORY:   Past Medical History:   Diagnosis Date     Alcoholism (H)     treatment multiple times, most recently 2016     Ankle fracture as child     Anxiety      Chicken pox      Chronic chest pain     wall pain vs anxiety, several ER visits and stress test negative     Concussion 2007     Diabetes (H)      Hypertension, goal below 140/90      Major depression in remission (H) age 10    no suicide attempts, [facility], [facility]     Marijuana smoker     in [place]     Morbid obesity (H)      Nasal fracture      OCD (obsessive compulsive disorder)      Polysubstance (excluding opioids) dependence, binge pattern (H)     Prior meth abuse--treatment in 2016     Psoriasis of scalp      Smoker     1/3 ppd     Wrist fracture as child    right       PAST SURGICAL HISTORY:   Past Surgical History:   Procedure Laterality Date     APPENDECTOMY       ENT SURGERY      nasal fx     HC TOOTH EXTRACTION  W/FORCEP       PE TUBES      as a child       FAMILY HISTORY:   Family History   Problem Relation Age of Onset     Depression Mother      Heart Surgery Mother         aortic valve abnormality     Hypertension Father      Pre-Diabetes Father      Diabetes Maternal Aunt      Diabetes Paternal Grandmother      Breast Cancer Maternal Aunt      Alcohol/Drug Maternal Uncle         and 1st cousin     Cancer Paternal Grandfather      Asthma No family hx of      C.A.D. No family hx of      Cerebrovascular Disease No family hx of      Cancer - colorectal No family hx of      Prostate Cancer No family hx of      Allergies No family hx of      Alzheimer Disease No family hx of      Anesthesia Reaction No family hx of      Arthritis No family hx of      Blood Disease No family hx of      Circulatory No family hx of      Congenital Anomalies No family hx of      Connective Tissue Disorder No family hx of      Endocrine Disease No family hx of      Eye Disorder No family hx of      Gastrointestinal Disease No family hx of      Genitourinary Problems No family hx of      Gynecology No family hx of      Lipids No family hx of      Musculoskeletal Disorder No family hx of      Neurologic Disorder No family hx of      Obesity No family hx of      Osteoporosis No family hx of      Respiratory No family hx of      Thyroid Disease No family hx of        SOCIAL HISTORY:   Social History     Tobacco Use     Smoking status: Former Smoker     Packs/day: 0.10     Years: 15.00     Pack years: 1.50     Smokeless tobacco: Never Used     Tobacco comment: socially, mostly quit in 2016   Substance Use Topics     Alcohol use: Yes     Comment: Occasional--about 1x per week. 2-6 drinks at a time       Current Discharge Medication List      CONTINUE these medications which have NOT CHANGED    Details   ASPIRIN PO Take 81 mg by mouth daily       hydrOXYzine (ATARAX) 50 MG tablet Take 25 mg by mouth      loratadine (CLARITIN) 10 MG tablet Take 10 mg by  "mouth      metFORMIN (GLUCOPHAGE-XR) 750 MG 24 hr tablet Take 750 mg by mouth      venlafaxine (EFFEXOR-XR) 150 MG 24 hr capsule Take 150 mg by mouth         STOP taking these medications       lisinopril (PRINIVIL/ZESTRIL) 40 MG tablet Comments:   Reason for Stopping:                  Allergies   Allergen Reactions     No Clinical Screening - See Comments      Other reaction(s): Runny Nose     Vilazodone Other (See Comments)     Blurred vision, racing heart      Review of Systems   Constitutional: Positive for activity change. Negative for fever.        Generalized malaise along with decreased range of motion of his left fourth finger because of swelling and pain and redness.   HENT: Negative for congestion and trouble swallowing.    Eyes: Negative for redness and visual disturbance.   Respiratory: Negative for cough and shortness of breath.    Cardiovascular: Negative for chest pain.   Gastrointestinal: Negative for abdominal pain, nausea and vomiting.   Genitourinary: Negative for difficulty urinating.   Musculoskeletal: Positive for myalgias. Negative for arthralgias and neck stiffness.        Left ring finger swelling and redness   Skin: Positive for rash and wound. Negative for color change.        Left fourth finger with marked swelling erythema with redness streaking up the top of his left forearm.  The dorsum of his mid phalanx on the left fourth finger area of questionable splinter was attempted to be removed a few days ago by patient himself.   Neurological: Negative for weakness, numbness and headaches.   Hematological: Does not bruise/bleed easily.   Psychiatric/Behavioral: Positive for decreased concentration and dysphoric mood. Negative for confusion. The patient is nervous/anxious.    All other systems reviewed and are negative.      Physical Exam   BP: 143/85  Pulse: 89  Temp: 99  F (37.2  C)  Resp: 20  Height: 177.8 cm (5' 10\")  Weight: 145.2 kg (320 lb)  SpO2: 99 %      Physical Exam "   Constitutional: He is oriented to person, place, and time. He appears well-developed and well-nourished. He appears distressed.   Patient mild distress is anxious here but able to be redirected.  He is uncomfortable because of the pain.  Vital signs still stable.   HENT:   Head: Normocephalic and atraumatic.   Eyes: Conjunctivae and EOM are normal. Pupils are equal, round, and reactive to light. No scleral icterus.   Neck: Normal range of motion. Neck supple.   Cardiovascular: Normal rate and regular rhythm.   Pulmonary/Chest: No stridor. No respiratory distress.   Abdominal: There is no tenderness. There is no guarding.   Musculoskeletal: He exhibits edema and tenderness.        Hands:  Left fourth finger noted marked swelling throughout with limited range of motion difficult to flex finger at all because of pain.  No vascular compromise distally.  Lymphogenic spread noted over the dorsum of the left arm MCC up the forearm initially from the erythematous swollen left fourth finger. NO Crepitus noted.   Neurological: He is alert and oriented to person, place, and time.   Skin: Skin is warm and dry. Capillary refill takes less than 2 seconds. Rash noted. He is not diaphoretic. There is erythema.   Left fourth finger with lymphangitic spread noted   Psychiatric:   Patient uncomfortable because the pain somewhat anxious here but can be redirected.   Nursing note and vitals reviewed.      ED Course        Procedures         Patient evaluated ER.  IV established liter normal saline given.  Dilaudid titrated for pain which did help.  Patient in the ER after blood cultures drawn laboratory testing noted white count 11,000.  Lactic acid 1.7.  Glucose to be 131.  X-rays done no foreign body or subcu air seen.  Orthopedic consultation.  We ordered vancomycin and Zosyn IV right away treated for infectious cause.  And was elevated seen by orthopedics in the ER they attempted to do incision and drainage of the dorsum of  the left fourth finger but no purulent drainage noted.  Feel at this point less likely that this is a flexor tenosynovitis.  More likely cellulitis continue management will continue to follow in the ED observation overnight.  Patient agrees with plan admitted to ED observation for acute left fourth finger cellulitis continue to monitor by orthopedics for potential flexor tenosynovitis patient be kept n.p.o. after midnight.  Continue antibiotics along with pain control etc.  Patient and family agree with plan.       Critical Care time:  none             Labs Ordered and Resulted from Time of ED Arrival Up to the Time of Departure from the ED   CBC WITH PLATELETS DIFFERENTIAL - Abnormal; Notable for the following components:       Result Value    WBC 11.9 (*)     RBC Count 4.18 (*)     Hemoglobin 13.2 (*)     Hematocrit 38.7 (*)     Absolute Neutrophil 10.6 (*)     Absolute Lymphocytes 0.5 (*)     All other components within normal limits   BASIC METABOLIC PANEL - Abnormal; Notable for the following components:    Sodium 132 (*)     Glucose 151 (*)     All other components within normal limits   HEPATIC PANEL - Abnormal; Notable for the following components:    Bilirubin Direct 0.3 (*)     Bilirubin Total 1.5 (*)     All other components within normal limits   GLUCOSE BY METER - Abnormal; Notable for the following components:    Glucose 223 (*)     All other components within normal limits   LACTIC ACID WHOLE BLOOD     Results for orders placed or performed during the hospital encounter of 04/04/19   Fingers XR, 2-3 views, left    Narrative    Exam: XR FINGER LT G/E 2 VW, 4/4/2019 2:31 PM    Indication: ? fb dorsum left 4th finger with now cellulitis    Comparison: None    Findings:   3 radiographic views focused on the fourth digit.    No displaced fracture. No substantial degenerative changes. No  radiopaque body. Soft tissue swelling of the fourth digit.      Impression    Impression: Soft tissue swelling of the  fourth digit. No radiopaque  foreign body.    NESTOR JACK MD   CBC with platelets differential   Result Value Ref Range    WBC 11.9 (H) 4.0 - 11.0 10e9/L    RBC Count 4.18 (L) 4.4 - 5.9 10e12/L    Hemoglobin 13.2 (L) 13.3 - 17.7 g/dL    Hematocrit 38.7 (L) 40.0 - 53.0 %    MCV 93 78 - 100 fl    MCH 31.6 26.5 - 33.0 pg    MCHC 34.1 31.5 - 36.5 g/dL    RDW 12.2 10.0 - 15.0 %    Platelet Count 157 150 - 450 10e9/L    Diff Method Automated Method     % Neutrophils 88.9 %    % Lymphocytes 4.5 %    % Monocytes 5.9 %    % Eosinophils 0.2 %    % Basophils 0.2 %    % Immature Granulocytes 0.3 %    Nucleated RBCs 0 0 /100    Absolute Neutrophil 10.6 (H) 1.6 - 8.3 10e9/L    Absolute Lymphocytes 0.5 (L) 0.8 - 5.3 10e9/L    Absolute Monocytes 0.7 0.0 - 1.3 10e9/L    Absolute Eosinophils 0.0 0.0 - 0.7 10e9/L    Absolute Basophils 0.0 0.0 - 0.2 10e9/L    Abs Immature Granulocytes 0.0 0 - 0.4 10e9/L    Absolute Nucleated RBC 0.0    Lactic acid   Result Value Ref Range    Lactic Acid 1.7 0.7 - 2.0 mmol/L   Basic metabolic panel   Result Value Ref Range    Sodium 132 (L) 133 - 144 mmol/L    Potassium 3.8 3.4 - 5.3 mmol/L    Chloride 98 94 - 109 mmol/L    Carbon Dioxide 22 20 - 32 mmol/L    Anion Gap 12 3 - 14 mmol/L    Glucose 151 (H) 70 - 99 mg/dL    Urea Nitrogen 22 7 - 30 mg/dL    Creatinine 1.09 0.66 - 1.25 mg/dL    GFR Estimate 85 >60 mL/min/[1.73_m2]    GFR Estimate If Black >90 >60 mL/min/[1.73_m2]    Calcium 8.7 8.5 - 10.1 mg/dL   Hepatic panel   Result Value Ref Range    Bilirubin Direct 0.3 (H) 0.0 - 0.2 mg/dL    Bilirubin Total 1.5 (H) 0.2 - 1.3 mg/dL    Albumin 3.9 3.4 - 5.0 g/dL    Protein Total 7.1 6.8 - 8.8 g/dL    Alkaline Phosphatase 55 40 - 150 U/L    ALT 56 0 - 70 U/L    AST 28 0 - 45 U/L   Glucose by meter   Result Value Ref Range    Glucose 223 (H) 70 - 99 mg/dL   CBC with platelets   Result Value Ref Range    WBC 5.8 4.0 - 11.0 10e9/L    RBC Count 4.05 (L) 4.4 - 5.9 10e12/L    Hemoglobin 12.8 (L) 13.3 - 17.7  g/dL    Hematocrit 38.7 (L) 40.0 - 53.0 %    MCV 96 78 - 100 fl    MCH 31.6 26.5 - 33.0 pg    MCHC 33.1 31.5 - 36.5 g/dL    RDW 12.3 10.0 - 15.0 %    Platelet Count 116 (L) 150 - 450 10e9/L   Basic metabolic panel   Result Value Ref Range    Sodium 139 133 - 144 mmol/L    Potassium 3.8 3.4 - 5.3 mmol/L    Chloride 108 94 - 109 mmol/L    Carbon Dioxide 24 20 - 32 mmol/L    Anion Gap 7 3 - 14 mmol/L    Glucose 131 (H) 70 - 99 mg/dL    Urea Nitrogen 15 7 - 30 mg/dL    Creatinine 1.09 0.66 - 1.25 mg/dL    GFR Estimate 85 >60 mL/min/[1.73_m2]    GFR Estimate If Black >90 >60 mL/min/[1.73_m2]    Calcium 8.5 8.5 - 10.1 mg/dL   Glucose by meter   Result Value Ref Range    Glucose 139 (H) 70 - 99 mg/dL   Glucose by meter   Result Value Ref Range    Glucose 175 (H) 70 - 99 mg/dL   Blood culture   Result Value Ref Range    Specimen Description Blood Right Arm     Special Requests Received in aerobic bottle only     Culture Micro No growth after 16 hours             Assessments & Plan (with Medical Decision Making)  38-year-old male with diabetes.  Patient try to remove a splinter a few days ago on the dorsum of his left finger.  Now the fingers are swollen red and with some streaking up his arm.  He has lymphatic spread.  There is no crepitus noted no vascular compromise unable to flex at this point orthopedic consultation unclear if this is truly flexor tenosynovitis they attempted to open the dorsum of the finger no purulent drainage noted.  Patient received Zosyn and vancomycin IV in the ER does not have history of MRSA.  X-rays did not show foreign body or subcu air or other abnormalities.  White count was 11,000 glucose 131 lactic acid 1.7.  Feel at this point differential includes cellulitis plus or minus flexor tenosynovitis is unclear per orthopedics will be admitted ED observation overnight for pain control ongoing IV antibiotics and reassessment by orthopedics patient be kept n.p.o. at midnight family agrees with  plan patient stable admitted.         I have reviewed the nursing notes.    I have reviewed the findings, diagnosis, plan and need for follow up with the patient.       Medication List      ASK your doctor about these medications    lisinopril 40 MG tablet  Commonly known as:  PRINIVIL/ZESTRIL  Ask about: Should I take this medication?            Final diagnoses:   Cellulitis of finger of left hand   Lymphangitis     I, Meseret Rodriguez, am serving as a trained medical scribe to document services personally performed by Simon Reagan MD based on the provider's statements to me on April 4, 2019.  This document has been checked and approved by the attending provider.    ISimon MD, was physically present and have reviewed and verified the accuracy of this note documented by Meseret Rodriguez, medical scribe.     4/4/2019   [facility] [place], EMERGENCY DEPARTMENT     Simon Reagan MD  04/05/19 0753

## 2019-04-04 NOTE — ED TRIAGE NOTES
Left ring finger was red yesterday, he pulled a sliver out of his finger last night, this morning finger is swollen and red. Redness is moving onto his hand

## 2019-04-05 ENCOUNTER — APPOINTMENT (OUTPATIENT)
Dept: ULTRASOUND IMAGING | Facility: CLINIC | Age: 39
DRG: 982 | End: 2019-04-05
Attending: NURSE PRACTITIONER
Payer: COMMERCIAL

## 2019-04-05 LAB
ANION GAP SERPL CALCULATED.3IONS-SCNC: 7 MMOL/L (ref 3–14)
BUN SERPL-MCNC: 15 MG/DL (ref 7–30)
CALCIUM SERPL-MCNC: 8.5 MG/DL (ref 8.5–10.1)
CHLORIDE SERPL-SCNC: 108 MMOL/L (ref 94–109)
CO2 SERPL-SCNC: 24 MMOL/L (ref 20–32)
CREAT SERPL-MCNC: 1.09 MG/DL (ref 0.66–1.25)
CRP SERPL-MCNC: 100 MG/L (ref 0–8)
ERYTHROCYTE [DISTWIDTH] IN BLOOD BY AUTOMATED COUNT: 12.3 % (ref 10–15)
ERYTHROCYTE [SEDIMENTATION RATE] IN BLOOD BY WESTERGREN METHOD: 13 MM/H (ref 0–15)
GFR SERPL CREATININE-BSD FRML MDRD: 85 ML/MIN/{1.73_M2}
GLUCOSE BLDC GLUCOMTR-MCNC: 139 MG/DL (ref 70–99)
GLUCOSE BLDC GLUCOMTR-MCNC: 139 MG/DL (ref 70–99)
GLUCOSE BLDC GLUCOMTR-MCNC: 175 MG/DL (ref 70–99)
GLUCOSE SERPL-MCNC: 131 MG/DL (ref 70–99)
HCT VFR BLD AUTO: 38.7 % (ref 40–53)
HGB BLD-MCNC: 12.8 G/DL (ref 13.3–17.7)
MCH RBC QN AUTO: 31.6 PG (ref 26.5–33)
MCHC RBC AUTO-ENTMCNC: 33.1 G/DL (ref 31.5–36.5)
MCV RBC AUTO: 96 FL (ref 78–100)
PLATELET # BLD AUTO: 116 10E9/L (ref 150–450)
POTASSIUM SERPL-SCNC: 3.8 MMOL/L (ref 3.4–5.3)
RBC # BLD AUTO: 4.05 10E12/L (ref 4.4–5.9)
SODIUM SERPL-SCNC: 139 MMOL/L (ref 133–144)
WBC # BLD AUTO: 5.8 10E9/L (ref 4–11)

## 2019-04-05 PROCEDURE — 25800030 ZZH RX IP 258 OP 636: Performed by: NURSE PRACTITIONER

## 2019-04-05 PROCEDURE — 40000275 ZZH STATISTIC RCP TIME EA 10 MIN

## 2019-04-05 PROCEDURE — 25800030 ZZH RX IP 258 OP 636

## 2019-04-05 PROCEDURE — G0378 HOSPITAL OBSERVATION PER HR: HCPCS

## 2019-04-05 PROCEDURE — 25000128 H RX IP 250 OP 636: Performed by: NURSE PRACTITIONER

## 2019-04-05 PROCEDURE — 96366 THER/PROPH/DIAG IV INF ADDON: CPT

## 2019-04-05 PROCEDURE — 85652 RBC SED RATE AUTOMATED: CPT | Performed by: NURSE PRACTITIONER

## 2019-04-05 PROCEDURE — 25000125 ZZHC RX 250: Performed by: ORTHOPAEDIC SURGERY

## 2019-04-05 PROCEDURE — 85027 COMPLETE CBC AUTOMATED: CPT | Performed by: NURSE PRACTITIONER

## 2019-04-05 PROCEDURE — 96376 TX/PRO/DX INJ SAME DRUG ADON: CPT

## 2019-04-05 PROCEDURE — 86140 C-REACTIVE PROTEIN: CPT | Performed by: NURSE PRACTITIONER

## 2019-04-05 PROCEDURE — 36415 COLL VENOUS BLD VENIPUNCTURE: CPT | Performed by: NURSE PRACTITIONER

## 2019-04-05 PROCEDURE — 10140 I&D HMTMA SEROMA/FLUID COLLJ: CPT

## 2019-04-05 PROCEDURE — 80048 BASIC METABOLIC PNL TOTAL CA: CPT | Performed by: NURSE PRACTITIONER

## 2019-04-05 PROCEDURE — 76882 US LMTD JT/FCL EVL NVASC XTR: CPT | Mod: LT

## 2019-04-05 PROCEDURE — 25000132 ZZH RX MED GY IP 250 OP 250 PS 637: Performed by: NURSE PRACTITIONER

## 2019-04-05 PROCEDURE — 00000146 ZZHCL STATISTIC GLUCOSE BY METER IP

## 2019-04-05 PROCEDURE — 96375 TX/PRO/DX INJ NEW DRUG ADDON: CPT

## 2019-04-05 PROCEDURE — 25000128 H RX IP 250 OP 636

## 2019-04-05 PROCEDURE — 0JDK0ZZ EXTRACTION OF LEFT HAND SUBCUTANEOUS TISSUE AND FASCIA, OPEN APPROACH: ICD-10-PCS | Performed by: ORTHOPAEDIC SURGERY

## 2019-04-05 RX ORDER — PIPERACILLIN SODIUM, TAZOBACTAM SODIUM 3; .375 G/15ML; G/15ML
3.38 INJECTION, POWDER, LYOPHILIZED, FOR SOLUTION INTRAVENOUS EVERY 6 HOURS
Status: DISCONTINUED | OUTPATIENT
Start: 2019-04-05 | End: 2019-04-09

## 2019-04-05 RX ORDER — OXYCODONE HYDROCHLORIDE 5 MG/1
5-10 TABLET ORAL EVERY 6 HOURS PRN
Status: DISCONTINUED | OUTPATIENT
Start: 2019-04-05 | End: 2019-04-06

## 2019-04-05 RX ORDER — DEXTROSE MONOHYDRATE 25 G/50ML
25-50 INJECTION, SOLUTION INTRAVENOUS
Status: DISCONTINUED | OUTPATIENT
Start: 2019-04-05 | End: 2019-04-10 | Stop reason: HOSPADM

## 2019-04-05 RX ORDER — LIDOCAINE HYDROCHLORIDE 20 MG/ML
40 INJECTION, SOLUTION INFILTRATION; PERINEURAL ONCE
Status: COMPLETED | OUTPATIENT
Start: 2019-04-05 | End: 2019-04-05

## 2019-04-05 RX ORDER — HYDROMORPHONE HCL/0.9% NACL/PF 0.2MG/0.2
0.2 SYRINGE (ML) INTRAVENOUS
Status: COMPLETED | OUTPATIENT
Start: 2019-04-05 | End: 2019-04-05

## 2019-04-05 RX ORDER — HYDROXYZINE HYDROCHLORIDE 25 MG/1
25 TABLET, FILM COATED ORAL 3 TIMES DAILY PRN
Status: DISCONTINUED | OUTPATIENT
Start: 2019-04-05 | End: 2019-04-10 | Stop reason: HOSPADM

## 2019-04-05 RX ORDER — NICOTINE POLACRILEX 4 MG
15-30 LOZENGE BUCCAL
Status: DISCONTINUED | OUTPATIENT
Start: 2019-04-05 | End: 2019-04-10 | Stop reason: HOSPADM

## 2019-04-05 RX ADMIN — ACETAMINOPHEN 650 MG: 325 TABLET, FILM COATED ORAL at 02:22

## 2019-04-05 RX ADMIN — OXYCODONE HYDROCHLORIDE 10 MG: 5 TABLET ORAL at 18:39

## 2019-04-05 RX ADMIN — PIPERACILLIN AND TAZOBACTAM 3.38 G: 3; .375 INJECTION, POWDER, FOR SOLUTION INTRAVENOUS at 06:01

## 2019-04-05 RX ADMIN — Medication 0.2 MG: at 11:31

## 2019-04-05 RX ADMIN — METFORMIN HYDROCHLORIDE 750 MG: 750 TABLET, EXTENDED RELEASE ORAL at 18:39

## 2019-04-05 RX ADMIN — Medication 0.2 MG: at 08:33

## 2019-04-05 RX ADMIN — LISINOPRIL 40 MG: 20 TABLET ORAL at 08:27

## 2019-04-05 RX ADMIN — VANCOMYCIN HYDROCHLORIDE 2000 MG: 10 INJECTION, POWDER, LYOPHILIZED, FOR SOLUTION INTRAVENOUS at 03:25

## 2019-04-05 RX ADMIN — ACETAMINOPHEN 650 MG: 325 TABLET, FILM COATED ORAL at 20:00

## 2019-04-05 RX ADMIN — ACETAMINOPHEN 650 MG: 325 TABLET, FILM COATED ORAL at 08:26

## 2019-04-05 RX ADMIN — Medication 0.5 MG: at 03:31

## 2019-04-05 RX ADMIN — ONDANSETRON 4 MG: 2 INJECTION INTRAMUSCULAR; INTRAVENOUS at 08:22

## 2019-04-05 RX ADMIN — PIPERACILLIN AND TAZOBACTAM 3.38 G: 3; .375 INJECTION, POWDER, FOR SOLUTION INTRAVENOUS at 13:10

## 2019-04-05 RX ADMIN — Medication 0.5 MG: at 06:31

## 2019-04-05 RX ADMIN — VANCOMYCIN HYDROCHLORIDE 2000 MG: 10 INJECTION, POWDER, LYOPHILIZED, FOR SOLUTION INTRAVENOUS at 15:43

## 2019-04-05 RX ADMIN — LIDOCAINE HYDROCHLORIDE 40 MG: 20 INJECTION, SOLUTION INFILTRATION; PERINEURAL at 11:06

## 2019-04-05 RX ADMIN — ACETAMINOPHEN 650 MG: 325 TABLET, FILM COATED ORAL at 13:10

## 2019-04-05 RX ADMIN — OXYCODONE HYDROCHLORIDE 10 MG: 5 TABLET ORAL at 11:02

## 2019-04-05 RX ADMIN — VENLAFAXINE HYDROCHLORIDE 150 MG: 150 CAPSULE, EXTENDED RELEASE ORAL at 08:26

## 2019-04-05 RX ADMIN — SODIUM CHLORIDE: 9 INJECTION, SOLUTION INTRAVENOUS at 15:08

## 2019-04-05 RX ADMIN — PIPERACILLIN AND TAZOBACTAM 3.38 G: 3; .375 INJECTION, POWDER, FOR SOLUTION INTRAVENOUS at 20:08

## 2019-04-05 ASSESSMENT — ENCOUNTER SYMPTOMS
BRUISES/BLEEDS EASILY: 0
HEADACHES: 0
NUMBNESS: 0
NAUSEA: 0
WEAKNESS: 0
FEVER: 0
ARTHRALGIAS: 0
CONFUSION: 0
COLOR CHANGE: 0
DIFFICULTY URINATING: 0
NECK STIFFNESS: 0
DECREASED CONCENTRATION: 1
WOUND: 1
EYE REDNESS: 0
NERVOUS/ANXIOUS: 1
MYALGIAS: 1
ABDOMINAL PAIN: 0
DYSPHORIC MOOD: 1
ACTIVITY CHANGE: 1
TROUBLE SWALLOWING: 0
COUGH: 0
SHORTNESS OF BREATH: 0
VOMITING: 0

## 2019-04-05 NOTE — PROGRESS NOTES
"Orthopaedic Surgery Progress Note   April 5, 2019    Subjective: No acute events overnight. Pain remains the same. Slight improvement of dorsal hand erythema NPO since midnight. Denies fever or chills, CP, SOB, numbness or tingling, motor dysfunction or weakness.     Objective: /69 (BP Location: Right arm)   Pulse 88   Temp 97.4  F (36.3  C) (Oral)   Resp 18   Ht 1.778 m (5' 10\")   Wt 145.2 kg (320 lb)   SpO2 98%   BMI 45.92 kg/m      General: NAD, alert and oriented, cooperative with exam.  Cardio: extremities wwp.   Respiratory: Non-labored breathing on room air.  MSK:   LUE:   Erythema and swelling appear same as yesterday on the ring finger, slightly improved on the hand dorsum.  Dorsal skin incision healing with minimal drainage.  Left ring finger is swollen from approximately the middle phalanx and proximally to the dorsum of the hand all the way to the carpus.  The patient is markedly tender to palpation over the finger and NTTP over the hands..  His tenderness is worse on the volar aspect of his ring finger.  His volar aspect of his finger is full from the distal phalanx to proximal phalanx. The dorsum of his finger is a little bit softer.     Fingers wwp, radial pulse palpable  Fires FPL/EPL/intrinsics  SILT in r/u/m nerve distributions    Labs:  Hemoglobin   Date Value Ref Range Status   04/04/2019 13.2 (L) 13.3 - 17.7 g/dL Final   ]  All cultures:  Recent Labs   Lab 04/04/19  1325   CULT No growth after 14 hours       Imaging: US pending to look for fluid collection    Assessment:   38 year old male w/ PMH homelessness, DM, HTN, polysubstance use presenting w/ LEFT hand cellulitis on whom ortho is consulted to rule out flexor tenosynovitis of the ring finger.     Exam largely the same today. Will obtain US to further assess presence of fluid collection.    Plan:  Ongoing discussion w/ staff re: op v nonop.  Keep NPO for now.  Ultrasound ordered to look for fluid collection.    Wilber Hodge, " MD  Orthopaedic Surgery, PGY-1  Pager: [phone]       For questions about this patient during the day, please attempt to contact me at my pager ([phone]) prior to contacting the Orthopaedic Surgery resident on call. Thank you!

## 2019-04-05 NOTE — ED NOTES
[facility], [place]   ED Nurse to Floor Handoff     Girma Trejo is a 38 year old male who speaks English and lives with others in a [employer] transitioning home,  in a shelter  They arrived in the ED by car from home    ED Chief Complaint: finger problem    ED Dx;   Final diagnoses:   Cellulitis of finger of left hand   Lymphangitis         Needed?: No    Allergies:   Allergies   Allergen Reactions     No Clinical Screening - See Comments      Other reaction(s): Runny Nose     Vilazodone Other (See Comments)     Blurred vision, racing heart   .  Past Medical Hx:   Past Medical History:   Diagnosis Date     Alcoholism (H)     treatment multiple times, most recently 2016     Ankle fracture as child     Anxiety      Chicken pox      Chronic chest pain     wall pain vs anxiety, several ER visits and stress test negative     Concussion 2007     Diabetes (H)      Hypertension, goal below 140/90      Major depression in remission (H) age 10    no suicide attempts, [facility], [facility]     Marijuana smoker     in [place]     Morbid obesity (H)      Nasal fracture      OCD (obsessive compulsive disorder)      Polysubstance (excluding opioids) dependence, binge pattern (H)     Prior meth abuse--treatment in 2016     Psoriasis of scalp      Smoker     1/3 ppd     Wrist fracture as child    right      Baseline Mental status: WDL  Current Mental Status changes: at basesline    Infection present or suspected this encounter: left hand cellulitis x 1 day.  Sepsis suspected: No  Isolation type: No active isolations     Activity level - Baseline/Home:  Independent  Activity Level - Current:   Independent    Bariatric equipment needed?: No    In the ED these meds were given:   Medications   vancomycin (VANCOCIN) 2,000 mg in sodium chloride 0.9 % 500 mL intermittent infusion (not administered)   lidocaine (PF) (XYLOCAINE) 1 % injection (not administered)   HYDROmorphone (PF) (DILAUDID) injection  0.5 mg (not administered)   0.9% sodium chloride BOLUS (0 mLs Intravenous Stopped 4/4/19 1854)   acetaminophen (TYLENOL) tablet 1,000 mg (1,000 mg Oral Given 4/4/19 1401)   HYDROmorphone (PF) (DILAUDID) injection 0.5 mg (0.5 mg Intravenous Given 4/4/19 1359)   piperacillin-tazobactam (ZOSYN) 3.375 g vial to attach to  mL bag (0 g Intravenous Stopped 4/4/19 1441)   vancomycin (VANCOCIN) 2,000 mg in sodium chloride 0.9 % 500 mL intermittent infusion (0 mg Intravenous Stopped 4/4/19 1727)   HYDROmorphone (PF) (DILAUDID) injection 0.5 mg (0.5 mg Intravenous Given 4/4/19 1636)   HYDROmorphone (PF) (DILAUDID) injection 0.5 mg (0.5 mg Intravenous Given 4/4/19 1758)       Drips running?  No    Home pump  No    Current LDAs  Peripheral IV 06/19/14 Right Lower forearm (Active)   Number of days: 1750       Peripheral IV 04/04/19 Right Upper forearm (Active)   Site Assessment WDL 4/4/2019  1:30 PM   Line Status Saline locked 4/4/2019  1:30 PM   Number of days: 0       Labs results:   Labs Ordered and Resulted from Time of ED Arrival Up to the Time of Departure from the ED   CBC WITH PLATELETS DIFFERENTIAL - Abnormal; Notable for the following components:       Result Value    WBC 11.9 (*)     RBC Count 4.18 (*)     Hemoglobin 13.2 (*)     Hematocrit 38.7 (*)     Absolute Neutrophil 10.6 (*)     Absolute Lymphocytes 0.5 (*)     All other components within normal limits   BASIC METABOLIC PANEL - Abnormal; Notable for the following components:    Sodium 132 (*)     Glucose 151 (*)     All other components within normal limits   HEPATIC PANEL - Abnormal; Notable for the following components:    Bilirubin Direct 0.3 (*)     Bilirubin Total 1.5 (*)     All other components within normal limits   LACTIC ACID WHOLE BLOOD   BLOOD CULTURE       Imaging Studies:   Recent Results (from the past 24 hour(s))   Fingers XR, 2-3 views, left    Narrative    Exam: XR FINGER LT G/E 2 VW, 4/4/2019 2:31 PM    Indication: ? fb dorsum left 4th  "finger with now cellulitis    Comparison: None    Findings:   3 radiographic views focused on the fourth digit.    No displaced fracture. No substantial degenerative changes. No  radiopaque body. Soft tissue swelling of the fourth digit.      Impression    Impression: Soft tissue swelling of the fourth digit. No radiopaque  foreign body.    NESTOR JACK MD       Recent vital signs:   /72   Pulse 91   Temp 99.4  F (37.4  C) (Oral)   Resp 22   Ht 1.778 m (5' 10\")   Wt 145.2 kg (320 lb)   SpO2 96%   BMI 45.92 kg/m      Cardiac Rhythm: Other  Pt needs tele? No  Skin/wound Issues: left hand erythema    Code Status: Full Code    Pain control: good    Nausea control: pt had none    Abnormal labs/tests/findings requiring intervention: n/a    Family present during ED course? Yes, briefly  Family Comments/Social Situation comments: Undomiciled    Tasks needing completion: None    Coby Renee RN  [facility]-- [phone] [place] ED  [phone] [facility] ED      "

## 2019-04-05 NOTE — PROGRESS NOTES
Social Work: Assessment with Discharge Plan    Patient Name:  Girma Trejo  :  1980  Age:  38 year old  MRN:  [id number]  Risk/Complexity Score:  N/A   Completed assessment with:  Chart review     Presenting Information   Reason for Referral:  No SW referral, SW to complete psychosocial assessment based on chart review  Date of Intake:  2019  Referral Source:  Chart Review  Decision Maker:  Self   Alternate Decision Maker:  Grandparent listed as emergency contact-Meagan Cesar PH: [phone]  Health Care Directive:  None in pt chart, unable to discuss at this time.   Living Situation:  Homeless and lives at [facility]   Previous Functional Status:  Independent  Patient and family understanding of hospitalization:  Unknown at this time. Per chart review, pt A&Ox4.   Cultural/Language/Spiritual Considerations:  , single male, English-speaking and undesignated Muslim.   Adjustment to Illness:  Unable to assess at this time.     Physical Health  Reason for Admission:    1. Cellulitis of finger of left hand    2. Lymphangitis      Services Needed/Recommended:  Home with no services    Mental Health/Chemical Dependency  Diagnosis:  Per chart review, OCD, anxiety, major depression in remission, marijuana use, alcoholism, polysubstance use.   Support/Services in Place:  Pt should have a / available at [facility]    Services Needed/Recommended:  Unable to assess at this time. Ongoing emotional and substance use support.     Support System  Significant relationship at present time:  Single male, grandmother emergency contact   Family of origin is available for support:  Grandmother, unsure of any additional support at this time.   Other support available:  Staff at transitional living facility, need to assess further   Gaps in support system:    Patient is caregiver to:  None     Provider Information   Primary Care Physician:   AydenKandy soto   [phone]   Clinic:  [address] [place] [address] / [facility] [address]      :  Unknown    Financial   Income Source:  Unknown  Financial Concerns:  Not assessed   Insurance:    Payor/Plan Subscriber Name Rel Member # Group #   [employer] BRAEDEN TREJO  [id number]      [address]       Discharge Plan   Patient and family discharge goal:  Unable to assess  Provided education on discharge plan:  NO  Patient agreeable to discharge plan:  Unsure at this time.   A list of Medicare Certified Facilities was provided to the patient and/or family to encourage patient choice. Patient's choices for facility are:  N/A  Will NH provide Skilled rehabilitation or complex medical:  N/A  General information regarding anticipated insurance coverage and possible out of pocket cost was discussed. Patient and patient's family are aware patient may incur the cost of transportation to the facility, pending insurance payment: NO  Barriers to discharge:  No barriers at this time.     Discharge Recommendations   Anticipated Disposition:  Home, no needs identified  Transportation Needs:  Other:  not discussed   Name of Transportation Company and Phone:  TBD    Additional comments   Per pt chart: Braeden Trejo is a 38 year old male with past medical history of homelessness, DMII, HTN, polysubstance abuse, and anxiety admitted on 4/4/2019 to ED Observation unit for monitoring of left hand cellulitis vs flexor tenosynovitis overnight. Orthopedics saw patient in ED and did bedside I&D with 4mm incision to dorsal aspect of 4th digit on left hand.     SWer attempted to meet with pt. Pt currently with ortho team getting I&D of his finger. Team requested SW f/u later this afternoon. SWer will follow up as time permits. No SW consult. SWer plan to complete psychosocial assessment and to evaluate for any resources/needs at time of discharge. Per pt chart, pt is living at [facility] in  transitional housing. Pt has hx of polysubstance use. In the past, per previous SW/RN CC notes, pt has taken the bus or has had a friend provide transportation at time of discharge.     SWer will remain available and attempt to follow up. If additional or immediate needs arise, please contact SWer.     MAGALIE Cardenas, [facility]  Acute Care Inpatient Clinical   6D-Observation Unit  Phone: [phone]  I   Pager: [phone]

## 2019-04-05 NOTE — PROGRESS NOTES
[facility], [facility] Progress Note - Emergency Department Observation Unit       Date of Admission:  4/4/2019  Assessment & Plan   Girma Trejo is a 38 year old male with past medical history of homelessness, DMII, HTN, polysubstance abuse, and anxiety admitted on 4/4/2019 to ED Observation unit for monitoring of left hand cellulitis vs flexor tenosynovitis overnight. Orthopedics saw patient in ED and did bedside I&D with 4mm incision to dorsal aspect of 4th digit on left hand.      1. Left 4th digit cellulitis:  Redness started on 4/3/19, patient tried to remove splinter with tweezers himself. Was woken from sleep due to pain and came into the ED in the morning on 4-4-19. WBC decreased from 11.9 to 5.8. Afebrile.  Sed Rate 13. US of the left extremity completed this morning per recommendations of orthopedic team: Soft tissue edema without hyperemia along the dorsal aspect of the fourth digit and hand. No drainable fluid collection. Ortho consulted  And Bedside I&D performed of the dorsal left small finger.The superficial soft tissue was explored and purulent material was expelled. Minimal debridement was performed. The wound was left open to drain. Recommend TID soaks with Iodine, Pain management, IV antibiotics. Recommend NPO at midnight should patient need to go surgery if finger worsens. Continue to Antibiotic coverage for MRSA as patient is homeless.   -VS Q4  -Oxycodone 10mg q 4 hours as needed for pain   -Dilaudid for breakthrough pain x 1   -Tylenol PRN  -IVF at 125ml/hr overnight  -Zosyn IV Q8 and Vancomycin IV Q12, transition to Keflex and Bactrim once improving.   -TID soaks with iodine in water diluted to a tea color.  -CRP, ESR, CBC and BMP in AM  -NPO after midnight if patient needs further surgical intervention.     2. Diarrhea:   -Enteric isolation  -Enteric stool panel ordered    3.DMII: BG today: 139, 175, 131, 139   Continue PTA metformin,   -  MSSI  - BG checks TID, HS  -  Hypoglycemic protocol.         Chronic Medical Problems:   ##HTN continue PTA lisinopril  ##Anxiety: hold atarax for now  ##Depression: continue PTA Effexor  ##INDIO: uses CPAP, this has been ordered.    Diet: Regular Diet Adult  NPO for Medical/Clinical Reasons Except for: Meds    DVT Prophylaxis: Low Risk/Ambulatory with no VTE prophylaxis indicated  Meléndez Catheter: not present  Code Status: Full Code      Disposition Plan   Expected discharge: Tomorrow, recommended to prior living arrangement once adequate pain management/ tolerating PO medications.  Entered: TONY Ribera CNP 04/05/2019, 12:27 PM       The patient's care was discussed with the Attending Physician, Dr. Dao, Bedside Nurse, Care Coordinator/ and Patient.    TONY Lane, NP  Emergency Department  [phone] Ex [phone]  ______________________________________________________________________    Interval History   Increased pain and erythema increased.     Data reviewed today: I reviewed all medications, new labs and imaging results over the last 24 hours.     Physical Exam   Vital Signs: Temp: 98.5  F (36.9  C) Temp src: Oral BP: (!) 200/95 Pulse: 84   Resp: 16 SpO2: 94 % O2 Device: None (Room air)    Weight: 320 lbs 0 oz  Constitutional: healthy, alert and no distress   Head: Normocephalic. No masses, lesions, tenderness or abnormalities   Neck: Neck supple. No adenopathy. Thyroid symmetric, normal size,, Carotids without bruits.   ENT: ENT exam normal, no neck nodes or sinus tenderness   Cardiovascular: RRR. No murmurs, clicks gallops or rub   Respiratory: . Good diaphragmatic excursion. Lungs clear   Gastrointestinal: Abdomen soft,. BS normal. No masses, organomegaly.   : Deferred   Musculoskeletal: extremities normal- no gross deformities noted, gait normal and normal muscle tone   Skin: Left ring finger is swollen from approximately the middle phalanx and proximally to the dorsum of the  hand.  Neurologic: Gait normal. Reflexes normal and symmetric. Sensation grossly WNL.   Psychiatric: mentation appears normal and affect normal/bright   Hematologic/Lymphatic/Immunologic: normal ant/post cervical, axillary, supraclavicular and inguinal     Data   Recent Labs   Lab 04/05/19  0648 04/04/19  1325   WBC 5.8 11.9*   HGB 12.8* 13.2*   MCV 96 93   * 157    132*   POTASSIUM 3.8 3.8   CHLORIDE 108 98   CO2 24 22   BUN 15 22   CR 1.09 1.09   ANIONGAP 7 12   KEISHA 8.5 8.7   * 151*   ALBUMIN  --  3.9   PROTTOTAL  --  7.1   BILITOTAL  --  1.5*   ALKPHOS  --  55   ALT  --  56   AST  --  28     Recent Results (from the past 24 hour(s))   Fingers XR, 2-3 views, left    Narrative    Exam: XR FINGER LT G/E 2 VW, 4/4/2019 2:31 PM    Indication: ? fb dorsum left 4th finger with now cellulitis    Comparison: None    Findings:   3 radiographic views focused on the fourth digit.    No displaced fracture. No substantial degenerative changes. No  radiopaque body. Soft tissue swelling of the fourth digit.      Impression    Impression: Soft tissue swelling of the fourth digit. No radiopaque  foreign body.    NESTOR JACK MD   US Extremity Non Vascular Left    Narrative    Exam: US EXTREMITY NON VASCULAR LEFT, 4/5/2019 10:15 AM    Indication: assess for fluid collection    Comparison: None available    Findings:   Grayscale and color Doppler evaluation of the area of interest over  the dorsal aspect of the left fourth digit and dorsal aspect of the  left hand. Edematous soft tissue along the dorsal aspect of the left  fourth digit and hand. Tissue is not hyperemic. No drainable fluid  collection.      Impression    Impression:   Soft tissue edema without hyperemia along the dorsal aspect of the  fourth digit and hand. No drainable fluid collection.    I have personally reviewed the examination and initial interpretation  and I agree with the findings.    BREANNE LAGUNA     Medications     sodium chloride  100 mL/hr at 04/04/19 2311       lisinopril  40 mg Oral Daily     metFORMIN  750 mg Oral Daily with supper     sodium chloride (PF)  3 mL Intracatheter Q8H     vancomycin (VANCOCIN) IV  2,000 mg Intravenous Q12H     venlafaxine  150 mg Oral Daily with breakfast

## 2019-04-05 NOTE — PLAN OF CARE
"Problem: Patient Care Overview  Goal: Plan of Care/Patient Progress Review  Outpatient/Observation goals to be met before discharge home:    -diagnostic tests and consults completed and resulted- UNMET- Ortho consult in AM for possible surgery.  -vital signs normal or at patient baseline- MET  -adequate pain control on oral analgesics- Pending  -tolerating oral antibiotics or has plans for home infusion setup- UNMET- still on IV Abx currently.   -infection is improving- Pending AM labs    Pt reported feeling diaphoretic w/ dizziness, VSS at that time, BG stable at 139. Pt unable to provide stool sample thus far. Pain managed after doses of IV Dilaudid and PO Oxy.     /77   Pulse 92   Temp 98.2  F (36.8  C) (Oral)   Resp 19   Ht 1.778 m (5' 10\")   Wt 145.2 kg (320 lb)   SpO2 96%   BMI 45.92 kg/m      "

## 2019-04-05 NOTE — H&P
[facility]    History and Physical - ED Observation       Date of Admission:  4/4/2019    Assessment & Plan   Girma Trejo is a 38 year old male with past medical history of homelessness, DMII, HTN, polysubstance abuse, and anxiety admitted on 4/4/2019 to ED Observation unit for monitoring of left hand cellulitis vs flexor tenosynovitis overnight. Orthopedics saw patient in ED and did bedside I&D with 4mm incision to dorsal aspect of 4th digit on left hand.     1. Left 4th digit cellulitis: Plan for trial of antibiotics overnight tonight with ortho to see in the morning. Redness started 48hrs ago, patient tried to remove splinter with tweezers himself. Was woken from sleep due to pain and came into the ED in the morning on 4-4-19. WBC 11.9. Afebrile.  -Admit to ED Obs  -VS Q4  -Oxycodone 10mg for pain  -Dilaudid for breakthrough pain  -Tylenol PRN  -IVF at 125ml/hr overnight  -Zosyn IV Q8  -Vancomycin IV Q12  -CBC and BMP in AM  -NPO after midnight    2. Diarrhea: Pt reports diarrhea 1-2x in the last 24hrs.   -Enteric isolation  -Enteric stool panel ordered      Chronic Medical Problems  ##DMII: Continue PTA metformin  ##HTN: continue PTA lisinopril  ##Anxiety: hold atarax for now  ##Depression: continue PTA Effexor  ##INDIO: uses CPAP, this has been ordered.     Diet: NPO per Anesthesia Guidelines for Procedure/Surgery Except for: Meds    DVT Prophylaxis: Low Risk/Ambulatory with no VTE prophylaxis indicated  Meléndez Catheter: not present  Code Status: Full    Disposition Plan   Expected discharge: Tomorrow, recommended to prior living arrangement once adequate pain management/ tolerating PO medications, antibiotic plan established and Surgery to follow .  Entered: Ashlee Gallego CNP 04/04/2019, 9:03 PM     The patient's care was discussed with the Attending Physician, Dr. Aleyda Jiang.    Ashlee Gallego CNP  [facility]  ED Observation.  "6D  ______________________________________________________________________    Chief Complaint   Finger problem     History is obtained from the patient    History of Present Illness   Per ED, \"Girma Trejo is a 38 year old male who presents with red, swollen, and painful left ring finger for the past few days.  He has a history of type 2 diabetes, metabolic syndrome and hypertension.  Patient states symptoms started after he got a a splinter in his finger, possibly from his desk. It was in his finger for a few days and developed a small [place] of redness. He attempted to remove splinter on 4-3-19. The finger became profoundly swollen, red and severely painful. He is no longer able to bend his finger. The redness has spread up his arm. He notes feeling sick this morning with diaphoresis. He wonders if he could have some water. He feels panicky at this time. No known drug allergies.\"    In the ED, /85, HR 89, temp 99, RR 20, 99% on RA. Labs drawn. Xray ordered. Dilaudid 0.5mg IV for pain x3.     Review of Systems    The 10 point Review of Systems is negative other than noted in the HPI or here.   Constitutional: malaise, reports fever  Musculoskeletal: left finger redness, swelling  GI: Diarrhea    Past Medical History    I have reviewed this patient's medical history and updated it with pertinent information if needed.   Past Medical History:   Diagnosis Date     Alcoholism (H)     treatment multiple times, most recently 2016     Ankle fracture as child     Anxiety      Chicken pox      Chronic chest pain     wall pain vs anxiety, several ER visits and stress test negative     Concussion 2007     Diabetes (H)      Hypertension, goal below 140/90      Major depression in remission (H) age 10    no suicide attempts, [facility], [facility]     Marijuana smoker     in [place]     Morbid obesity (H)      Nasal fracture      OCD (obsessive compulsive disorder)      Polysubstance (excluding opioids) dependence, binge pattern (H)  "    Prior meth abuse--treatment in 2016     Psoriasis of scalp      Smoker     1/3 ppd     Wrist fracture as child    right       Past Surgical History   I have reviewed this patient's surgical history and updated it with pertinent information if needed.  Past Surgical History:   Procedure Laterality Date     APPENDECTOMY       ENT SURGERY      nasal fx     HC TOOTH EXTRACTION W/FORCEP       PE TUBES      as a child       Social History   I have reviewed this patient's social history and updated it with pertinent information if needed.  Social History     Tobacco Use     Smoking status: Former Smoker     Packs/day: 0.10     Years: 15.00     Pack years: 1.50     Smokeless tobacco: Never Used     Tobacco comment: socially, mostly quit in 2016   Substance Use Topics     Alcohol use: Yes     Comment: Occasional--about 1x per week. 2-6 drinks at a time     Drug use: Yes     Types: Marijuana     Comment: occasional marijuana--once every 3 or 4 months, prior meth abuse       Family History   I have reviewed this patient's family history and updated it with pertinent information if needed.   Family History   Problem Relation Age of Onset     Depression Mother      Heart Surgery Mother         aortic valve abnormality     Hypertension Father      Pre-Diabetes Father      Diabetes Maternal Aunt      Diabetes Paternal Grandmother      Breast Cancer Maternal Aunt      Alcohol/Drug Maternal Uncle         and 1st cousin     Cancer Paternal Grandfather      Asthma No family hx of      C.A.D. No family hx of      Cerebrovascular Disease No family hx of      Cancer - colorectal No family hx of      Prostate Cancer No family hx of      Allergies No family hx of      Alzheimer Disease No family hx of      Anesthesia Reaction No family hx of      Arthritis No family hx of      Blood Disease No family hx of      Circulatory No family hx of      Congenital Anomalies No family hx of      Connective Tissue Disorder No family hx of       Endocrine Disease No family hx of      Eye Disorder No family hx of      Gastrointestinal Disease No family hx of      Genitourinary Problems No family hx of      Gynecology No family hx of      Lipids No family hx of      Musculoskeletal Disorder No family hx of      Neurologic Disorder No family hx of      Obesity No family hx of      Osteoporosis No family hx of      Respiratory No family hx of      Thyroid Disease No family hx of        Prior to Admission Medications   Prior to Admission Medications   Prescriptions Last Dose Informant Patient Reported? Taking?   ASPIRIN PO 4/3/2019 at Unknown time  Yes Yes   Sig: Take 81 mg by mouth daily    hydrOXYzine (ATARAX) 50 MG tablet Past Month at Unknown time  Yes Yes   Sig: Take 25 mg by mouth   lisinopril (PRINIVIL/ZESTRIL) 40 MG tablet   Yes No   Sig: Take 40 mg by mouth   loratadine (CLARITIN) 10 MG tablet 4/3/2019 at Unknown time  Yes Yes   Sig: Take 10 mg by mouth   metFORMIN (GLUCOPHAGE-XR) 750 MG 24 hr tablet 4/3/2019 at Unknown time  Yes Yes   Sig: Take 750 mg by mouth   venlafaxine (EFFEXOR-XR) 150 MG 24 hr capsule 4/3/2019 at Unknown time  Yes Yes   Sig: Take 150 mg by mouth      Facility-Administered Medications: None     Allergies   Allergies   Allergen Reactions     No Clinical Screening - See Comments      Other reaction(s): Runny Nose     Vilazodone Other (See Comments)     Blurred vision, racing heart       Physical Exam   Vital Signs: Temp: 99.4  F (37.4  C) Temp src: Oral BP: 116/72 Pulse: 91   Resp: 22 SpO2: 96 % O2 Device: None (Room air)    Weight: 320 lbs 0 oz    Constitutional: awake, alert, cooperative, no apparent distress, and appears stated age  Eyes: Lids and lashes normal, pupils equal, round and reactive to light, extra ocular muscles intact, sclera clear, conjunctiva normal  ENT: Normocephalic, without obvious abnormality, atraumatic, sinuses nontender on palpation, external ears without lesions, oral pharynx with moist mucous membranes,  tonsils without erythema or exudates, gums normal and good dentition.  Respiratory: No increased work of breathing, good air exchange, clear to auscultation bilaterally, no crackles or wheezing  Cardiovascular: Normal apical impulse, regular rate and rhythm, normal S1 and S2, no S3 or S4, and no murmur noted  GI: No scars, normal bowel sounds, soft, non-distended, non-tender, no masses palpated, no hepatosplenomegally  Skin: warm and dry. There is redness of the left hand, wrist, and arm.  Musculoskeletal: There is redness and swelling of the left 4th digit, hand, wrist, extending up into arm. Full range of motion noted.  Motor strength is 5 out of 5 all extremities bilaterally.  Tone is normal.  Neurologic: Awake, alert, oriented to name, place and time.  Cranial nerves II-XII are grossly intact.  Motor is 5 out of 5 bilaterally. Gait is normal.    Data   Data reviewed today: I reviewed all medications, new labs and imaging results over the last 24 hours. I personally reviewed the Hand Xray image(s) showing soft tissue swelling, no radiopaque foreign body..    Recent Labs   Lab 04/04/19  1325   WBC 11.9*   HGB 13.2*   MCV 93      *   POTASSIUM 3.8   CHLORIDE 98   CO2 22   BUN 22   CR 1.09   ANIONGAP 12   KEISHA 8.7   *   ALBUMIN 3.9   PROTTOTAL 7.1   BILITOTAL 1.5*   ALKPHOS 55   ALT 56   AST 28

## 2019-04-05 NOTE — PROGRESS NOTES
I have seen and evaluated the patient on 4/5/19. I discussed the patient with the resident and agree with the H and P from 4/4/19 by Dr. Hodge. The patient underwent and I and D by Dr. Hodge and Dr. Wilmar Bui on 4/4/19 at bedside. On my evaluation on 4/5/19, the patient has a scant amount of purulent discharge coming from the dorsal hand wound which appeared to be closing. This made me concern ed that the wound is closing up and that the patient can benefit from repeat I and D. We discussed the patients concern about this being done at bedside versus in the OR. He reports he had inadequate pain control last time. We agreed that we would make an attempt at doing this at bedside as I feel this will be sufficient but if he has pain we will stop and do it in the operating room instead. Patient was agreeable to this. See procedure not below. Patient tolerated well. Of note, the patient does not have signs of FTS on my exam.       Ortho Procedure Note  04/05/19  11:31 AM    Bedside I&D performed of the dorsal left small finger. Verbal consent was obtained and the patient was anesthetized with a digital block using 10 mL of 1% lidocaine. The area was prepped in sterile fashion. The prior explored area over the dorsal aspect of the middle phalanx was extended longitudinally proximally and distally to a total length of about 1.5 cm using a sharp 15 blade.  Dissection was taken down to but not through the level of the extensor tendon. No further purulent material was encountered. No foreign bodies were encountered. A cotton tip applicator was used to make sure there were no further fluid pockets to either side of the explored area.  Further minimal debridement was performed leaving only healthy tissue behind, and the wound was left open to drain. Due to the small size of the wound there was no exposed vital structures or tendon at the conclusion. A sterile dressing was applied of adaptic, 4x4, beverly, and ace wrap.      Plan:  - TID soaks with iodine in water diluted to a tea color.  - Anticipate patient will continue to improve with pain control and IV ABx.   - Resting extension splint to be worn between soaks  -Strict eelvation.   - Anticipate no further invasive interventions; if, however, patient does not improve or his pain becomes worse, will do more thorough I&D in OR under anesthesia, therefore patient should be NPO at midnight.      I was present for and performed the above procedure in its entirety with olivia Hodge assisting.

## 2019-04-06 PROBLEM — L03.90 CELLULITIS: Status: ACTIVE | Noted: 2019-04-06

## 2019-04-06 LAB
ALBUMIN SERPL-MCNC: 2.8 G/DL (ref 3.4–5)
ALP SERPL-CCNC: 54 U/L (ref 40–150)
ALT SERPL W P-5'-P-CCNC: 46 U/L (ref 0–70)
ANION GAP SERPL CALCULATED.3IONS-SCNC: 8 MMOL/L (ref 3–14)
AST SERPL W P-5'-P-CCNC: 26 U/L (ref 0–45)
BASOPHILS # BLD AUTO: 0 10E9/L (ref 0–0.2)
BASOPHILS NFR BLD AUTO: 0.4 %
BILIRUB SERPL-MCNC: 0.5 MG/DL (ref 0.2–1.3)
BUN SERPL-MCNC: 12 MG/DL (ref 7–30)
C COLI+JEJUNI+LARI FUSA STL QL NAA+PROBE: NOT DETECTED
C DIFF TOX B STL QL: NEGATIVE
CALCIUM SERPL-MCNC: 8 MG/DL (ref 8.5–10.1)
CHLORIDE SERPL-SCNC: 112 MMOL/L (ref 94–109)
CO2 SERPL-SCNC: 19 MMOL/L (ref 20–32)
CREAT SERPL-MCNC: 1.07 MG/DL (ref 0.66–1.25)
CRP SERPL-MCNC: 100 MG/L (ref 0–8)
DIFFERENTIAL METHOD BLD: ABNORMAL
EC STX1 GENE STL QL NAA+PROBE: NOT DETECTED
EC STX2 GENE STL QL NAA+PROBE: NOT DETECTED
ENTERIC PATHOGEN COMMENT: NORMAL
EOSINOPHIL # BLD AUTO: 0.1 10E9/L (ref 0–0.7)
EOSINOPHIL NFR BLD AUTO: 1.8 %
ERYTHROCYTE [DISTWIDTH] IN BLOOD BY AUTOMATED COUNT: 12.6 % (ref 10–15)
ERYTHROCYTE [SEDIMENTATION RATE] IN BLOOD BY WESTERGREN METHOD: 27 MM/H (ref 0–15)
GFR SERPL CREATININE-BSD FRML MDRD: 86 ML/MIN/{1.73_M2}
GLUCOSE BLDC GLUCOMTR-MCNC: 138 MG/DL (ref 70–99)
GLUCOSE BLDC GLUCOMTR-MCNC: 203 MG/DL (ref 70–99)
GLUCOSE SERPL-MCNC: 128 MG/DL (ref 70–99)
HCT VFR BLD AUTO: 36 % (ref 40–53)
HGB BLD-MCNC: 12 G/DL (ref 13.3–17.7)
IMM GRANULOCYTES # BLD: 0 10E9/L (ref 0–0.4)
IMM GRANULOCYTES NFR BLD: 0.2 %
LYMPHOCYTES # BLD AUTO: 0.7 10E9/L (ref 0.8–5.3)
LYMPHOCYTES NFR BLD AUTO: 13.8 %
MAGNESIUM SERPL-MCNC: NORMAL MG/DL (ref 1.6–2.3)
MCH RBC QN AUTO: 31.4 PG (ref 26.5–33)
MCHC RBC AUTO-ENTMCNC: 33.3 G/DL (ref 31.5–36.5)
MCV RBC AUTO: 94 FL (ref 78–100)
MONOCYTES # BLD AUTO: 0.3 10E9/L (ref 0–1.3)
MONOCYTES NFR BLD AUTO: 6.6 %
NEUTROPHILS # BLD AUTO: 4 10E9/L (ref 1.6–8.3)
NEUTROPHILS NFR BLD AUTO: 77.2 %
NOROV GI+II ORF1-ORF2 JNC STL QL NAA+PR: NOT DETECTED
NRBC # BLD AUTO: 0 10*3/UL
NRBC BLD AUTO-RTO: 0 /100
PLATELET # BLD AUTO: 110 10E9/L (ref 150–450)
POTASSIUM SERPL-SCNC: 4.3 MMOL/L (ref 3.4–5.3)
PROT SERPL-MCNC: 5.8 G/DL (ref 6.8–8.8)
RBC # BLD AUTO: 3.82 10E12/L (ref 4.4–5.9)
RVA NSP5 STL QL NAA+PROBE: NOT DETECTED
SALMONELLA SP RPOD STL QL NAA+PROBE: NOT DETECTED
SHIGELLA SP+EIEC IPAH STL QL NAA+PROBE: NOT DETECTED
SODIUM SERPL-SCNC: 139 MMOL/L (ref 133–144)
SPECIMEN SOURCE: NORMAL
V CHOL+PARA RFBL+TRKH+TNAA STL QL NAA+PR: NOT DETECTED
VANCOMYCIN SERPL-MCNC: 14.4 MG/L
WBC # BLD AUTO: 5.1 10E9/L (ref 4–11)
Y ENTERO RECN STL QL NAA+PROBE: NOT DETECTED

## 2019-04-06 PROCEDURE — 80202 ASSAY OF VANCOMYCIN: CPT | Performed by: FAMILY MEDICINE

## 2019-04-06 PROCEDURE — 25800030 ZZH RX IP 258 OP 636

## 2019-04-06 PROCEDURE — 25000128 H RX IP 250 OP 636

## 2019-04-06 PROCEDURE — 80053 COMPREHEN METABOLIC PANEL: CPT | Performed by: PHYSICIAN ASSISTANT

## 2019-04-06 PROCEDURE — 25000132 ZZH RX MED GY IP 250 OP 250 PS 637: Performed by: NURSE PRACTITIONER

## 2019-04-06 PROCEDURE — 00000146 ZZHCL STATISTIC GLUCOSE BY METER IP

## 2019-04-06 PROCEDURE — 96366 THER/PROPH/DIAG IV INF ADDON: CPT

## 2019-04-06 PROCEDURE — 12000001 ZZH R&B MED SURG/OB UMMC

## 2019-04-06 PROCEDURE — 85652 RBC SED RATE AUTOMATED: CPT | Performed by: PHYSICIAN ASSISTANT

## 2019-04-06 PROCEDURE — 96376 TX/PRO/DX INJ SAME DRUG ADON: CPT

## 2019-04-06 PROCEDURE — 86140 C-REACTIVE PROTEIN: CPT | Performed by: PHYSICIAN ASSISTANT

## 2019-04-06 PROCEDURE — 36416 COLLJ CAPILLARY BLOOD SPEC: CPT | Performed by: PHYSICIAN ASSISTANT

## 2019-04-06 PROCEDURE — 36415 COLL VENOUS BLD VENIPUNCTURE: CPT | Performed by: FAMILY MEDICINE

## 2019-04-06 PROCEDURE — 87493 C DIFF AMPLIFIED PROBE: CPT | Performed by: PHYSICIAN ASSISTANT

## 2019-04-06 PROCEDURE — 25000128 H RX IP 250 OP 636: Performed by: PHYSICIAN ASSISTANT

## 2019-04-06 PROCEDURE — 85025 COMPLETE CBC W/AUTO DIFF WBC: CPT | Performed by: PHYSICIAN ASSISTANT

## 2019-04-06 PROCEDURE — 87506 IADNA-DNA/RNA PROBE TQ 6-11: CPT | Performed by: NURSE PRACTITIONER

## 2019-04-06 PROCEDURE — 25000128 H RX IP 250 OP 636: Performed by: NURSE PRACTITIONER

## 2019-04-06 PROCEDURE — 40000141 ZZH STATISTIC PERIPHERAL IV START W/O US GUIDANCE

## 2019-04-06 PROCEDURE — 25000132 ZZH RX MED GY IP 250 OP 250 PS 637: Performed by: PHYSICIAN ASSISTANT

## 2019-04-06 PROCEDURE — 40000275 ZZH STATISTIC RCP TIME EA 10 MIN

## 2019-04-06 PROCEDURE — G0378 HOSPITAL OBSERVATION PER HR: HCPCS

## 2019-04-06 RX ORDER — OXYCODONE HYDROCHLORIDE 5 MG/1
5-10 TABLET ORAL EVERY 4 HOURS PRN
Status: DISCONTINUED | OUTPATIENT
Start: 2019-04-06 | End: 2019-04-10 | Stop reason: HOSPADM

## 2019-04-06 RX ORDER — HYDROMORPHONE HCL/0.9% NACL/PF 0.2MG/0.2
0.2 SYRINGE (ML) INTRAVENOUS
Status: COMPLETED | OUTPATIENT
Start: 2019-04-06 | End: 2019-04-06

## 2019-04-06 RX ADMIN — OXYCODONE HYDROCHLORIDE 10 MG: 5 TABLET ORAL at 18:32

## 2019-04-06 RX ADMIN — OXYCODONE HYDROCHLORIDE 5 MG: 5 TABLET ORAL at 10:06

## 2019-04-06 RX ADMIN — HYDROXYZINE HYDROCHLORIDE 25 MG: 25 TABLET ORAL at 05:45

## 2019-04-06 RX ADMIN — Medication 0.2 MG: at 05:45

## 2019-04-06 RX ADMIN — PIPERACILLIN AND TAZOBACTAM 3.38 G: 3; .375 INJECTION, POWDER, FOR SOLUTION INTRAVENOUS at 10:34

## 2019-04-06 RX ADMIN — VANCOMYCIN HYDROCHLORIDE 2000 MG: 10 INJECTION, POWDER, LYOPHILIZED, FOR SOLUTION INTRAVENOUS at 03:10

## 2019-04-06 RX ADMIN — ACETAMINOPHEN 650 MG: 325 TABLET, FILM COATED ORAL at 12:06

## 2019-04-06 RX ADMIN — PIPERACILLIN AND TAZOBACTAM 3.38 G: 3; .375 INJECTION, POWDER, FOR SOLUTION INTRAVENOUS at 02:06

## 2019-04-06 RX ADMIN — VENLAFAXINE HYDROCHLORIDE 150 MG: 150 CAPSULE, EXTENDED RELEASE ORAL at 09:57

## 2019-04-06 RX ADMIN — OXYCODONE HYDROCHLORIDE 5 MG: 5 TABLET ORAL at 10:29

## 2019-04-06 RX ADMIN — ACETAMINOPHEN 650 MG: 325 TABLET, FILM COATED ORAL at 05:24

## 2019-04-06 RX ADMIN — METFORMIN HYDROCHLORIDE 750 MG: 750 TABLET, EXTENDED RELEASE ORAL at 18:32

## 2019-04-06 RX ADMIN — PIPERACILLIN AND TAZOBACTAM 3.38 G: 3; .375 INJECTION, POWDER, FOR SOLUTION INTRAVENOUS at 20:44

## 2019-04-06 RX ADMIN — VANCOMYCIN HYDROCHLORIDE 2000 MG: 10 INJECTION, POWDER, LYOPHILIZED, FOR SOLUTION INTRAVENOUS at 16:35

## 2019-04-06 RX ADMIN — ACETAMINOPHEN 650 MG: 325 TABLET, FILM COATED ORAL at 20:58

## 2019-04-06 RX ADMIN — LISINOPRIL 40 MG: 20 TABLET ORAL at 09:57

## 2019-04-06 RX ADMIN — PIPERACILLIN AND TAZOBACTAM 3.38 G: 3; .375 INJECTION, POWDER, FOR SOLUTION INTRAVENOUS at 14:04

## 2019-04-06 RX ADMIN — OXYCODONE HYDROCHLORIDE 10 MG: 5 TABLET ORAL at 02:05

## 2019-04-06 ASSESSMENT — ACTIVITIES OF DAILY LIVING (ADL)
DRESS: 0-->INDEPENDENT
FALL_HISTORY_WITHIN_LAST_SIX_MONTHS: NO
RETIRED_COMMUNICATION: 0-->UNDERSTANDS/COMMUNICATES WITHOUT DIFFICULTY
ADLS_ACUITY_SCORE: 12
TRANSFERRING: 0-->INDEPENDENT
RETIRED_EATING: 0-->INDEPENDENT
ADLS_ACUITY_SCORE: 12
TOILETING: 0-->INDEPENDENT
BATHING: 0-->INDEPENDENT
COGNITION: 0 - NO COGNITION ISSUES REPORTED
SWALLOWING: 0-->SWALLOWS FOODS/LIQUIDS WITHOUT DIFFICULTY
ADLS_ACUITY_SCORE: 12
AMBULATION: 0-->INDEPENDENT

## 2019-04-06 ASSESSMENT — PAIN DESCRIPTION - DESCRIPTORS
DESCRIPTORS: THROBBING
DESCRIPTORS: ACHING;INTERMITTENT

## 2019-04-06 NOTE — PLAN OF CARE
Outpatient/Observation goals to be met before discharge home:     -diagnostic tests and consults completed and resulted: No  -vital signs normal or at patient baseline: Yes  -adequate pain control on oral analgesics: Yes  -tolerating oral antibiotics or has plans for home infusion setup: N/A, pt has IV antibiotics  -infection is improving: In-process    Finger soaked x1 (1 total today). Pt still needs stool sample, pt is aware.

## 2019-04-06 NOTE — PLAN OF CARE
Observation Goals:  -diagnostic tests and consults completed and resulted-In process, ortho following up tomorrow.  -vital signs normal or at patient baseline: VSS /Baseline  -adequate pain control on oral analgesics- Met, PO oxycodone effective at managing pain.   -tolerating oral antibiotics or has plans for home infusion setup- Not met, continues IV zosyn and vanco  -infection is improving- In process, red area improving, swelling and pain decreased, labs and vitals improving, ortho to follow up tomorrow    Finger soaked x1 (2 total today). Needing stool sample, pt aware.

## 2019-04-06 NOTE — PROGRESS NOTES
"Orthopaedic Surgery Progress Note   April 5, 2019    Subjective: No acute events overnight. Pain improving. Slight improvement of dorsal hand erythema NPO since midnight. Denies fever or chills, CP, SOB, numbness or tingling, motor dysfunction or weakness.     Objective: /72 (BP Location: Right arm)   Pulse 76   Temp 98.1  F (36.7  C) (Oral)   Resp 16   Ht 1.778 m (5' 10\")   Wt 145.2 kg (320 lb)   SpO2 96%   BMI 45.92 kg/m      General: NAD, alert and oriented, cooperative with exam.  Cardio: extremities wwp.   Respiratory: Non-labored breathing on room air.  MSK:   LUE:   Erythema and swelling improved since yesterday on the ring finger and on the hand dorsum.  Dorsal skin incision with minimal drainage.  Left ring finger is swollen from approximately the middle phalanx and proximally to the dorsum of the hand all the way to the carpus.    The patient is markedly tender to palpation over the finger and NTTP over the hands.  His tenderness is worse on the volar aspect of his ring finger.  His volar aspect of his finger is full from the distal phalanx to proximal phalanx. The dorsum of his finger is a little bit softer.     Fingers wwp, radial pulse palpable  Fires FPL/EPL/intrinsics  SILT in r/u/m nerve distributions    Labs:  Hemoglobin   Date Value Ref Range Status   04/05/2019 12.8 (L) 13.3 - 17.7 g/dL Final   ]  All cultures:  Recent Labs   Lab 04/04/19  1325   CULT No growth after 2 days       Assessment:   38 year old male w/ PMH homelessness, DM, HTN, polysubstance use presenting w/ LEFT hand cellulitis on whom ortho is consulted to rule out flexor tenosynovitis of the ring finger; no concern for FT at this time and patient is now s/p bedside I&D of dorsal left ring finger w/ Dr. Bui on 4/5.    Anticipated more improvement than we are seeing, but he is slightly improved from yesterday. Therefore recommending another day in the hospital with IV antibiotics.    Purulence was released at " bedside I&D and the patient did not have a large abscess on ultrasound and his symptoms are not characteristic for FT; therefore best diagnosis at this time continues to be cellulitis, however expected more improvement by this point. Recommend consideration of ID consult for assistance and evaluation of antibiotics and therapeutic levels.    Plan:  Medicine primary, appreciate cares  - Slight objective improvement in exam from yesterday.  - Okay for diet today, NPO at midnight in case operative intervention needed tomorrow.  - Continue to check WBC and CRP daily.  - TID soaks with iodine in water diluted to a tea color.  - Anticipate patient will continue to improve with pain control and IV ABx  - ABx per primary      Wilber Hodge MD  Orthopaedic Surgery, PGY-1  Pager: [phone]       For questions about this patient during the day, please attempt to contact me at my pager ([phone]) prior to contacting the Orthopaedic Surgery resident on call. Thank you!

## 2019-04-06 NOTE — PLAN OF CARE
Outpatient/Observation goals to be met before discharge home:     1. diagnostic tests and consults completed and resulted: No, patient NPO awaiting Ortho Surg Consult and decision.    2. vital signs normal or at patient baseline: Yes    3. adequate pain control on oral analgesics: Pending, Oxycodone 10 mg given overnight with once dose of Dilaudid. Patient sleeping soundly this am.    4. tolerating oral antibiotics or has plans for home infusion setup: N/A, pt has IV antibiotics    5. infection is improving: In-process, marking further up wrist/forearm, eurythermic to hand only, appears to be receeding.     Finger soaked x1 (1 total today). Pt still needs stool sample, pt is aware.

## 2019-04-06 NOTE — PROGRESS NOTES
-diagnostic tests and consults completed and resulted- MET  -vital signs normal or at patient baseline: VSS /Baseline  -adequate pain control on oral analgesics- pain has decreased since procedure. PO oxycodone effective at managing pain.   -tolerating oral antibiotics or has plans for home infusion setup- continues on zosyn and vanco  -infection is improving- red area improving, swelling and pain decreased, labs and vitals improving  Alert and oriented x4. LS clear, on RA. Up with SBA. IVF running at 100 ml/hour. Regular diet, tolerated oral intake. Finger soaked in iodine/tap water x1. Rested between care. Voiding adequately. Has 2 loose stools per report- one incontinently. Awaiting stool sample collection (hat in place and pt aware).

## 2019-04-06 NOTE — PROGRESS NOTES
Observation Unit Transfer Summary     Patient ID:  Girma Trejo  MRN: [id number]  38 year old  YOB: 1980    Observation Admit Date: 4/4/2019    ED Admitting Attending: Simon Reagan MD    Transfer Date and Time: April 6, 2019 at 9:08 AM     Transferring Observation Provider: TONY Uriostegui CNP    Admission Diagnoses:     1. Cellulitis of finger of left hand    2. Lymphangitis        Transfer Diagnoses:    1. Cellulitis of left 4th digit    Emergency Department and Observation Course:      Girma Trejo is a 38 year old male with past medical history of homelessness, DMII, HTN, polysubstance abuse, and anxiety admitted on 4/4/2019 to ED Observation unit for monitoring of left hand cellulitis vs flexor tenosynovitis overnight. Orthopedics saw patient in ED and did bedside I&D with 4mm incision to dorsal aspect of 4th digit on left hand.         1. Left 4th digit cellulitis:  Redness started on 4/3/19, patient tried to remove splinter with tweezers himself. Was woken from sleep due to pain and came into the ED in the morning on 4-4-19. WBC decreased from 11.9 to 5.8. Afebrile.  Sed Rate 13. US of the left extremity showed: Soft tissue edema without hyperemia along the dorsal aspect of the fourth digit and hand. No drainable fluid collection. Ortho consulted  And Bedside I&D performed of the dorsal left small finger.The superficial soft tissue was explored and purulent material was expelled. Minimal debridement was performed. The wound was left open to drain. Recommend TID soaks with Iodine, Pain management, IV antibiotics. Recommend NPO at midnight should patient need to go surgery if finger worsens. Continue to Antibiotic coverage for MRSA as patient is homeless (he is living in transitional housing).  He had minimal improvement today and needs further antibiotics and monitoring in the hospital.  Orthopedics involved.  Patient transferred to medicine for further inpatient care.   "  -VS Q4  -Oxycodone 10mg q 4 hours as needed for pain   -Tylenol PRN  -IVF at 125ml/hr   -Zosyn IV Q8 and Vancomycin IV Q12, consider ID consult if no improvement  -TID soaks with iodine in water diluted to a tea color.  -NPO after midnight if patient needs further surgical intervention.     2. Diarrhea: no stools overnight.  -Enteric isolation  -Enteric stool panel ordered     3.DMII: stable.   Continue PTA metformin,   - MSSI  - BG checks TID, HS  -  Hypoglycemic protocol.       At this time the patient has failed observation management due to continued cellulitis with no improvement on >24 hours of IV antibiotics and will be transferred to inpatient status.    Consults: orthopedic surgery    DATA:    Transfer Exam:    /70 (BP Location: Right arm)   Pulse 82   Temp 97.6  F (36.4  C) (Oral)   Resp 16   Ht 1.778 m (5' 10\")   Wt 145.2 kg (320 lb)   SpO2 96%   BMI 45.92 kg/m    Exam:  Constitutional: healthy, alert and no distress  Cardiovascular: No lifts, heaves, or thrills. RRR. No murmurs, clicks gallops or rub  Respiratory: Good diaphragmatic excursion. Lungs clear  Gastrointestinal: Abdomen soft, non-tender. BS normal. No masses, organomegaly  Musculoskeletal: extremities normal- no gross deformities noted, gait normal and normal muscle tone  Skin: no suspicious lesions or rashes, left 4th digit with +2 swelling and erythema, I&D incision with no drainage.  Limited ROM due to pain and swelling.   Neurologic: Gait normal. Alert and oriented.   Psychiatric: mentation appears normal and affect normal/bright    /70 (BP Location: Right arm)   Pulse 82   Temp 97.6  F (36.4  C) (Oral)   Resp 16   Ht 1.778 m (5' 10\")   Wt 145.2 kg (320 lb)   SpO2 96%   BMI 45.92 kg/m          Current Medications:    No current outpatient medications on file.       Medications Prior to Admission:    Medications Prior to Admission   Medication Sig Dispense Refill Last Dose     ASPIRIN PO Take 81 mg by mouth " daily    4/3/2019 at Unknown time     hydrOXYzine (ATARAX) 50 MG tablet Take 25 mg by mouth   Past Month at Unknown time     loratadine (CLARITIN) 10 MG tablet Take 10 mg by mouth   4/3/2019 at Unknown time     metFORMIN (GLUCOPHAGE-XR) 750 MG 24 hr tablet Take 750 mg by mouth   4/3/2019 at Unknown time     venlafaxine (EFFEXOR-XR) 150 MG 24 hr capsule Take 150 mg by mouth   4/3/2019 at Unknown time     [] lisinopril (PRINIVIL/ZESTRIL) 40 MG tablet Take 40 mg by mouth   Taking       Significant Diagnostic Studies:     Results for orders placed or performed during the hospital encounter of 19   Fingers XR, 2-3 views, left    Narrative    Exam: XR FINGER LT G/E 2 VW, 2019 2:31 PM    Indication: ? fb dorsum left 4th finger with now cellulitis    Comparison: None    Findings:   3 radiographic views focused on the fourth digit.    No displaced fracture. No substantial degenerative changes. No  radiopaque body. Soft tissue swelling of the fourth digit.      Impression    Impression: Soft tissue swelling of the fourth digit. No radiopaque  foreign body.    NESTOR JACK MD   US Extremity Non Vascular Left    Narrative    Exam: US EXTREMITY NON VASCULAR LEFT, 2019 10:15 AM    Indication: assess for fluid collection    Comparison: None available    Findings:   Grayscale and color Doppler evaluation of the area of interest over  the dorsal aspect of the left fourth digit and dorsal aspect of the  left hand. Edematous soft tissue along the dorsal aspect of the left  fourth digit and hand. Tissue is not hyperemic. No drainable fluid  collection.      Impression    Impression:   Soft tissue edema without hyperemia along the dorsal aspect of the  fourth digit and hand. No drainable fluid collection.    I have personally reviewed the examination and initial interpretation  and I agree with the findings.    BREANNE LAGUNA   CBC with platelets differential   Result Value Ref Range    WBC 11.9 (H) 4.0 - 11.0  10e9/L    RBC Count 4.18 (L) 4.4 - 5.9 10e12/L    Hemoglobin 13.2 (L) 13.3 - 17.7 g/dL    Hematocrit 38.7 (L) 40.0 - 53.0 %    MCV 93 78 - 100 fl    MCH 31.6 26.5 - 33.0 pg    MCHC 34.1 31.5 - 36.5 g/dL    RDW 12.2 10.0 - 15.0 %    Platelet Count 157 150 - 450 10e9/L    Diff Method Automated Method     % Neutrophils 88.9 %    % Lymphocytes 4.5 %    % Monocytes 5.9 %    % Eosinophils 0.2 %    % Basophils 0.2 %    % Immature Granulocytes 0.3 %    Nucleated RBCs 0 0 /100    Absolute Neutrophil 10.6 (H) 1.6 - 8.3 10e9/L    Absolute Lymphocytes 0.5 (L) 0.8 - 5.3 10e9/L    Absolute Monocytes 0.7 0.0 - 1.3 10e9/L    Absolute Eosinophils 0.0 0.0 - 0.7 10e9/L    Absolute Basophils 0.0 0.0 - 0.2 10e9/L    Abs Immature Granulocytes 0.0 0 - 0.4 10e9/L    Absolute Nucleated RBC 0.0    Lactic acid   Result Value Ref Range    Lactic Acid 1.7 0.7 - 2.0 mmol/L   Basic metabolic panel   Result Value Ref Range    Sodium 132 (L) 133 - 144 mmol/L    Potassium 3.8 3.4 - 5.3 mmol/L    Chloride 98 94 - 109 mmol/L    Carbon Dioxide 22 20 - 32 mmol/L    Anion Gap 12 3 - 14 mmol/L    Glucose 151 (H) 70 - 99 mg/dL    Urea Nitrogen 22 7 - 30 mg/dL    Creatinine 1.09 0.66 - 1.25 mg/dL    GFR Estimate 85 >60 mL/min/[1.73_m2]    GFR Estimate If Black >90 >60 mL/min/[1.73_m2]    Calcium 8.7 8.5 - 10.1 mg/dL   Hepatic panel   Result Value Ref Range    Bilirubin Direct 0.3 (H) 0.0 - 0.2 mg/dL    Bilirubin Total 1.5 (H) 0.2 - 1.3 mg/dL    Albumin 3.9 3.4 - 5.0 g/dL    Protein Total 7.1 6.8 - 8.8 g/dL    Alkaline Phosphatase 55 40 - 150 U/L    ALT 56 0 - 70 U/L    AST 28 0 - 45 U/L   Glucose by meter   Result Value Ref Range    Glucose 223 (H) 70 - 99 mg/dL   CBC with platelets   Result Value Ref Range    WBC 5.8 4.0 - 11.0 10e9/L    RBC Count 4.05 (L) 4.4 - 5.9 10e12/L    Hemoglobin 12.8 (L) 13.3 - 17.7 g/dL    Hematocrit 38.7 (L) 40.0 - 53.0 %    MCV 96 78 - 100 fl    MCH 31.6 26.5 - 33.0 pg    MCHC 33.1 31.5 - 36.5 g/dL    RDW 12.3 10.0 - 15.0 %     Platelet Count 116 (L) 150 - 450 10e9/L   Basic metabolic panel   Result Value Ref Range    Sodium 139 133 - 144 mmol/L    Potassium 3.8 3.4 - 5.3 mmol/L    Chloride 108 94 - 109 mmol/L    Carbon Dioxide 24 20 - 32 mmol/L    Anion Gap 7 3 - 14 mmol/L    Glucose 131 (H) 70 - 99 mg/dL    Urea Nitrogen 15 7 - 30 mg/dL    Creatinine 1.09 0.66 - 1.25 mg/dL    GFR Estimate 85 >60 mL/min/[1.73_m2]    GFR Estimate If Black >90 >60 mL/min/[1.73_m2]    Calcium 8.5 8.5 - 10.1 mg/dL   Glucose by meter   Result Value Ref Range    Glucose 139 (H) 70 - 99 mg/dL   Glucose by meter   Result Value Ref Range    Glucose 175 (H) 70 - 99 mg/dL   CRP inflammation   Result Value Ref Range    CRP Inflammation 100.0 (H) 0.0 - 8.0 mg/L   Erythrocyte sedimentation rate auto   Result Value Ref Range    Sed Rate 13 0 - 15 mm/h   Glucose by meter   Result Value Ref Range    Glucose 139 (H) 70 - 99 mg/dL   Comprehensive metabolic panel   Result Value Ref Range    Sodium 139 133 - 144 mmol/L    Potassium 4.3 3.4 - 5.3 mmol/L    Chloride 112 (H) 94 - 109 mmol/L    Carbon Dioxide 19 (L) 20 - 32 mmol/L    Anion Gap 8 3 - 14 mmol/L    Glucose 128 (H) 70 - 99 mg/dL    Urea Nitrogen 12 7 - 30 mg/dL    Creatinine 1.07 0.66 - 1.25 mg/dL    GFR Estimate 86 >60 mL/min/[1.73_m2]    GFR Estimate If Black >90 >60 mL/min/[1.73_m2]    Calcium 8.0 (L) 8.5 - 10.1 mg/dL    Bilirubin Total 0.5 0.2 - 1.3 mg/dL    Albumin 2.8 (L) 3.4 - 5.0 g/dL    Protein Total 5.8 (L) 6.8 - 8.8 g/dL    Alkaline Phosphatase 54 40 - 150 U/L    ALT 46 0 - 70 U/L    AST 26 0 - 45 U/L   CBC with platelets differential   Result Value Ref Range    WBC 5.1 4.0 - 11.0 10e9/L    RBC Count 3.82 (L) 4.4 - 5.9 10e12/L    Hemoglobin 12.0 (L) 13.3 - 17.7 g/dL    Hematocrit 36.0 (L) 40.0 - 53.0 %    MCV 94 78 - 100 fl    MCH 31.4 26.5 - 33.0 pg    MCHC 33.3 31.5 - 36.5 g/dL    RDW 12.6 10.0 - 15.0 %    Platelet Count 110 (L) 150 - 450 10e9/L    Diff Method Automated Method     % Neutrophils 77.2 %     % Lymphocytes 13.8 %    % Monocytes 6.6 %    % Eosinophils 1.8 %    % Basophils 0.4 %    % Immature Granulocytes 0.2 %    Nucleated RBCs 0 0 /100    Absolute Neutrophil 4.0 1.6 - 8.3 10e9/L    Absolute Lymphocytes 0.7 (L) 0.8 - 5.3 10e9/L    Absolute Monocytes 0.3 0.0 - 1.3 10e9/L    Absolute Eosinophils 0.1 0.0 - 0.7 10e9/L    Absolute Basophils 0.0 0.0 - 0.2 10e9/L    Abs Immature Granulocytes 0.0 0 - 0.4 10e9/L    Absolute Nucleated RBC 0.0    CRP inflammation   Result Value Ref Range    CRP Inflammation 100.0 (H) 0.0 - 8.0 mg/L   Erythrocyte sedimentation rate auto   Result Value Ref Range    Sed Rate 27 (H) 0 - 15 mm/h   Magnesium   Result Value Ref Range    Magnesium Canceled, Test credited 1.6 - 2.3 mg/dL   Blood culture   Result Value Ref Range    Specimen Description Blood Right Arm     Special Requests Received in aerobic bottle only     Culture Micro No growth after 2 days        Signed:  Sadie العلي, APRN, CNP  April 6, 2019 at 9:08 AM

## 2019-04-06 NOTE — PHARMACY-VANCOMYCIN DOSING SERVICE
Pharmacy Vancomycin Note  Date of Service 2019  Patient's  1980   38 year old, male    Indication: Skin and Soft Tissue Infection  Goal Trough Level: 10-15 mg/L  Day of Therapy: 3  Current Vancomycin regimen:  2000 mg IV q12h (~14mg/kg actual body weight; ~20 mg/kg adjusted body weight of 101.9 kg)    Current estimated CrCl = Estimated Creatinine Clearance: 133.6 mL/min (based on SCr of 1.07 mg/dL).    Creatinine for last 3 days  2019:  1:25 PM Creatinine 1.09 mg/dL  2019:  6:48 AM Creatinine 1.09 mg/dL  2019:  6:12 AM Creatinine 1.07 mg/dL    Recent Vancomycin Levels (past 3 days)  2019:  3:16 PM Vancomycin Level 14.4 mg/L (12h)    Vancomycin IV Administrations (past 72 hours)                   vancomycin (VANCOCIN) 2,000 mg in sodium chloride 0.9 % 500 mL intermittent infusion (mg) 2,000 mg New Bag 19 1635     2,000 mg New Bag  0310     2,000 mg New Bag 19 1543     2,000 mg New Bag  0325    vancomycin (VANCOCIN) 2,000 mg in sodium chloride 0.9 % 500 mL intermittent infusion (mg) 2,000 mg New Bag 19 1440                Nephrotoxins and other renal medications (From now, onward)    Start     Dose/Rate Route Frequency Ordered Stop    19 1300  piperacillin-tazobactam (ZOSYN) 3.375 g vial to attach to  mL bag      3.375 g  over 30 Minutes Intravenous EVERY 6 HOURS 19 1254      19 0800  lisinopril (PRINIVIL/ZESTRIL) tablet 40 mg      40 mg Oral DAILY 19 2350      19 0300  vancomycin (VANCOCIN) 2,000 mg in sodium chloride 0.9 % 500 mL intermittent infusion      2,000 mg  over 2 Hours Intravenous EVERY 12 HOURS 19 1515               Contrast Orders - past 72 hours (72h ago, onward)    None          Interpretation of levels and current regimen:  Trough level is  Therapeutic    Has serum creatinine changed > 50% in last 72 hours: No    Urine output:  good urine output    Renal Function: Stable    Plan:  1.  Continue Current Dose  2.   Pharmacy will check trough levels as appropriate in 1-3 Days.    3. Serum creatinine levels will be ordered daily for the first week of therapy and at least twice weekly for subsequent weeks.      Jane Matos, PharmD

## 2019-04-06 NOTE — PLAN OF CARE
Outpatient/Observation goals to be met before discharge home:     -diagnostic tests and consults completed and resulted: No  -vital signs normal or at patient baseline: Yes  -adequate pain control on oral analgesics: Yes  -tolerating oral antibiotics or has plans for home infusion setup: N/A  -infection is improving: In-process

## 2019-04-06 NOTE — PROGRESS NOTES
-diagnostic tests and consults completed and resulted- Ortho consult and performed I/D at bedside. Lab back- improving.   -vital signs normal or at patient baseline- VSS.   -adequate pain control on oral analgesics- Pain increased with I/D procedure. Will reassess later.   -tolerating oral antibiotics or has plans for home infusion setup-Continuing on IV abx.   -infection is improving-Labs and vitals improving.          Ortho MD performed I/D with topical lidocaine at bedside - new orders to have pt soak finger in iodine /tap water solution for 20 minutes T.i.D.  Pt reported being disturbed by open slit- loose gauze covering finger. IVF running. Regular diet. Up with SBA.

## 2019-04-06 NOTE — PROGRESS NOTES
-diagnostic tests and consults completed and resulted- Awaiting ortho consult. Labs pending. US pending.   -vital signs normal or at patient baseline: VSS /Baseline  -adequate pain control on oral analgesics- Pt in severe pain- required IV dilaudid.   -tolerating oral antibiotics or has plans for home infusion setup- continues on zosyn and vanco  -infection is improving- Still awaiting AM labs   Alert and oriented x4. LS clear, on RA. HR rate and bp elevated with pain. Pt required IV pain medication and was very anxious about coloring and swelling of his left hand. Redness spread slightly passed marked line, left hand swollen at knuckles and 2-3-4th fingers , tingling at finger tip with severe pain with touch or movement. Pt also reported decrease range of motion in hands. Provider notified. Given nausea medication for nausea. Up with SBA. IVF running at 100 ml/hour.

## 2019-04-06 NOTE — PROGRESS NOTES
Patient admitted to [facility] Hospitalist service from ED obs    CC: left ring finger pain    HPI: Pt presented to ED 2 days ago with about 2 days of left ring finger pain, reddness, and swelling. He first noticed the reddness and pain and thought that it was a splinter though he does not recall getting a splinter. He attempted to remove the splinter at home with tweezers. The redness expanded and the swelling worsened over the coming day. He also notes associated malaise. No pets or exposure to animals that would raise concern for bite/scratch as source.     Ortho was consulted and did a bedside debridement on 4/4. Vanc and zosyn were started that day. He continues to feel some malaise today.     ROS: notable for diarrhea since hospitalization, otherwise negative.     Fam: HTN, DM, cardiac disease, depression. No significant immune or malignant trends in family history.     Soc: lives in transitional housing for the past few months. Smokes occasional cigarettes. Drinks EtOH rarely and denies any drug use for the past few years. Feels that he's in a good place currently. Sexually active and not consistently using barrier protection.     Med: T2DM, HTN, OCD, h/o drug abuse (never IV)  Surg: appy, nasal fx surgery, tooth removal  Meds: ASA 81mg, lisinopril 40 mg qday, metformin 750 daily, venlafaxine 150 daily, PRN atarax, loratidine (PTA)  Since hospitalization: vanc, zosyn, NS @ 100/hr, oxy 5-10, dilaudid 0.2 - 0.5    Afebrile since admission with max HRs in the 90s, no hypotension.   Erythematous area on the volar hand is smaller than the outline. Some erythema unmarked on the palmar side of the finger not extending into the palm itself. Significant swelling present. Line of incision is clean and dry.   WBC 11.9, trending down to 5's after presenting    ESR 13 --> 27    Finger xray - soft tissue swelling without foreign body  US - no drainable fluid collection    A/P     # Left 4th finger cellulitis, less  likely flexor tenosynovitis  Ortho following, very low suspicion for flexor tenosynovitis though the swelling and erythema have been slow to resolve. The slow resolution could be consistent with strept cellulitis. No gross purulence encountered per Dr. Benitez's note on 4/4 which would also be consistent with strep. If there were purulence, I would be more suspicious of staph aureus/MRSA. Blood cultures collected 4/4 without growth.This is my first day examining the patient so its difficult to fully assess trajectory. I expect improvement in exam over the next few days and will consult ID if no significant improvements seen.   - Ortho continues to monitor. NPO at MN in case of worsening exam tomorrow  - Continue vanc and zosyn for now  - If exam continues to improve, would narrow to clindamycin which would cover MRSA and strept infections  - CBC and CRP in AM.     # Diarrhea  # Non-anion gap metabolic acidosis  Expected with antibiotics. C. Diff testing underway.     # Lymphopenia, sexual exposures, no recent screening  - HIV testing today, pt agreeable. Would not affect treatment plan.     Holding ASA. Continue home lisinopril, metformin, venlafaxin.     Lines: PIV in right AC  Dispo: Return to transitional housing at discharge  Family/friends: to be updated by patient    Eloisa Garcia MD  [facility]   Pager [phone]

## 2019-04-07 LAB
ANION GAP SERPL CALCULATED.3IONS-SCNC: 5 MMOL/L (ref 3–14)
BUN SERPL-MCNC: 10 MG/DL (ref 7–30)
CALCIUM SERPL-MCNC: 8.8 MG/DL (ref 8.5–10.1)
CHLORIDE SERPL-SCNC: 111 MMOL/L (ref 94–109)
CO2 SERPL-SCNC: 26 MMOL/L (ref 20–32)
CREAT SERPL-MCNC: 1.01 MG/DL (ref 0.66–1.25)
CRP SERPL-MCNC: 54 MG/L (ref 0–8)
ERYTHROCYTE [DISTWIDTH] IN BLOOD BY AUTOMATED COUNT: 12.5 % (ref 10–15)
GFR SERPL CREATININE-BSD FRML MDRD: >90 ML/MIN/{1.73_M2}
GLUCOSE BLDC GLUCOMTR-MCNC: 109 MG/DL (ref 70–99)
GLUCOSE SERPL-MCNC: 113 MG/DL (ref 70–99)
HCT VFR BLD AUTO: 37.9 % (ref 40–53)
HGB BLD-MCNC: 12.1 G/DL (ref 13.3–17.7)
MCH RBC QN AUTO: 30.9 PG (ref 26.5–33)
MCHC RBC AUTO-ENTMCNC: 31.9 G/DL (ref 31.5–36.5)
MCV RBC AUTO: 97 FL (ref 78–100)
PLATELET # BLD AUTO: 135 10E9/L (ref 150–450)
POTASSIUM SERPL-SCNC: 4.1 MMOL/L (ref 3.4–5.3)
RBC # BLD AUTO: 3.91 10E12/L (ref 4.4–5.9)
SODIUM SERPL-SCNC: 142 MMOL/L (ref 133–144)
WBC # BLD AUTO: 4.4 10E9/L (ref 4–11)

## 2019-04-07 PROCEDURE — 25800030 ZZH RX IP 258 OP 636

## 2019-04-07 PROCEDURE — 25000132 ZZH RX MED GY IP 250 OP 250 PS 637: Performed by: NURSE PRACTITIONER

## 2019-04-07 PROCEDURE — 36415 COLL VENOUS BLD VENIPUNCTURE: CPT | Performed by: INTERNAL MEDICINE

## 2019-04-07 PROCEDURE — 25000128 H RX IP 250 OP 636

## 2019-04-07 PROCEDURE — 00000146 ZZHCL STATISTIC GLUCOSE BY METER IP

## 2019-04-07 PROCEDURE — 40000275 ZZH STATISTIC RCP TIME EA 10 MIN

## 2019-04-07 PROCEDURE — 25000128 H RX IP 250 OP 636: Performed by: NURSE PRACTITIONER

## 2019-04-07 PROCEDURE — 12000001 ZZH R&B MED SURG/OB UMMC

## 2019-04-07 PROCEDURE — 99233 SBSQ HOSP IP/OBS HIGH 50: CPT | Performed by: INTERNAL MEDICINE

## 2019-04-07 PROCEDURE — 25800030 ZZH RX IP 258 OP 636: Performed by: NURSE PRACTITIONER

## 2019-04-07 PROCEDURE — 25000132 ZZH RX MED GY IP 250 OP 250 PS 637: Performed by: PHYSICIAN ASSISTANT

## 2019-04-07 PROCEDURE — 85027 COMPLETE CBC AUTOMATED: CPT | Performed by: INTERNAL MEDICINE

## 2019-04-07 PROCEDURE — 87389 HIV-1 AG W/HIV-1&-2 AB AG IA: CPT | Performed by: INTERNAL MEDICINE

## 2019-04-07 PROCEDURE — 80048 BASIC METABOLIC PNL TOTAL CA: CPT | Performed by: INTERNAL MEDICINE

## 2019-04-07 PROCEDURE — 86140 C-REACTIVE PROTEIN: CPT | Performed by: INTERNAL MEDICINE

## 2019-04-07 RX ADMIN — OXYCODONE HYDROCHLORIDE 10 MG: 5 TABLET ORAL at 13:21

## 2019-04-07 RX ADMIN — PIPERACILLIN AND TAZOBACTAM 3.38 G: 3; .375 INJECTION, POWDER, FOR SOLUTION INTRAVENOUS at 20:00

## 2019-04-07 RX ADMIN — PIPERACILLIN AND TAZOBACTAM 3.38 G: 3; .375 INJECTION, POWDER, FOR SOLUTION INTRAVENOUS at 09:04

## 2019-04-07 RX ADMIN — OXYCODONE HYDROCHLORIDE 10 MG: 5 TABLET ORAL at 04:23

## 2019-04-07 RX ADMIN — VANCOMYCIN HYDROCHLORIDE 2000 MG: 10 INJECTION, POWDER, LYOPHILIZED, FOR SOLUTION INTRAVENOUS at 03:00

## 2019-04-07 RX ADMIN — SODIUM CHLORIDE: 9 INJECTION, SOLUTION INTRAVENOUS at 20:00

## 2019-04-07 RX ADMIN — OXYCODONE HYDROCHLORIDE 10 MG: 5 TABLET ORAL at 09:00

## 2019-04-07 RX ADMIN — LISINOPRIL 40 MG: 20 TABLET ORAL at 09:01

## 2019-04-07 RX ADMIN — OXYCODONE HYDROCHLORIDE 10 MG: 5 TABLET ORAL at 18:35

## 2019-04-07 RX ADMIN — PIPERACILLIN AND TAZOBACTAM 3.38 G: 3; .375 INJECTION, POWDER, FOR SOLUTION INTRAVENOUS at 02:06

## 2019-04-07 RX ADMIN — OXYCODONE HYDROCHLORIDE 5 MG: 5 TABLET ORAL at 00:08

## 2019-04-07 RX ADMIN — OXYCODONE HYDROCHLORIDE 10 MG: 5 TABLET ORAL at 23:25

## 2019-04-07 RX ADMIN — HYDROXYZINE HYDROCHLORIDE 25 MG: 25 TABLET ORAL at 21:46

## 2019-04-07 RX ADMIN — PIPERACILLIN AND TAZOBACTAM 3.38 G: 3; .375 INJECTION, POWDER, FOR SOLUTION INTRAVENOUS at 15:05

## 2019-04-07 RX ADMIN — METFORMIN HYDROCHLORIDE 750 MG: 750 TABLET, EXTENDED RELEASE ORAL at 18:35

## 2019-04-07 RX ADMIN — SODIUM CHLORIDE: 9 INJECTION, SOLUTION INTRAVENOUS at 06:04

## 2019-04-07 RX ADMIN — VANCOMYCIN HYDROCHLORIDE 2000 MG: 10 INJECTION, POWDER, LYOPHILIZED, FOR SOLUTION INTRAVENOUS at 16:17

## 2019-04-07 RX ADMIN — ACETAMINOPHEN 650 MG: 325 TABLET, FILM COATED ORAL at 21:46

## 2019-04-07 RX ADMIN — OXYCODONE HYDROCHLORIDE 5 MG: 5 TABLET ORAL at 00:03

## 2019-04-07 RX ADMIN — ACETAMINOPHEN 650 MG: 325 TABLET, FILM COATED ORAL at 15:01

## 2019-04-07 RX ADMIN — VENLAFAXINE HYDROCHLORIDE 150 MG: 150 CAPSULE, EXTENDED RELEASE ORAL at 09:01

## 2019-04-07 ASSESSMENT — ACTIVITIES OF DAILY LIVING (ADL)
ADLS_ACUITY_SCORE: 12

## 2019-04-07 ASSESSMENT — PAIN DESCRIPTION - DESCRIPTORS
DESCRIPTORS: SHARP;THROBBING
DESCRIPTORS: ACHING
DESCRIPTORS: THROBBING
DESCRIPTORS: ACHING;THROBBING

## 2019-04-07 NOTE — PROGRESS NOTES
Pt remains in hospital for of cellulitis of  the 4th left finger, redness and swelling persist. Left hand dressing in place with splint and ace wrap. Finger was  soaked with iodine in water diluted to a tea color.Remains on Vanco and Zosyn. Reported of pain, Oxycodone and Tylenol given with minimal relief.Care clustered and sleeping between cares. Independent to bathroom and voiding, no BM. Blood sugar 135 and 109. NPO at midnight.

## 2019-04-07 NOTE — PLAN OF CARE
"A&O, VSS. Cellulitis to 4th digit of left hand, erythema and swelling present, I&D done & splint applied with ACE wrap per Ortho Surg Resident. Pain to left 4th finger/hand controlled with Oxycodone 10 mg prn, given twice this shift. Was NPO and given regular diet today, will be NPO after midnight for reassessment by Ortho tomorrow. , tolerating regular diet. Metformin give with supper. Ambulates in room independently and voiding spontaneously. Patient  states feeling very tired and has been sleeping most of the shift.   /53 (BP Location: Right arm)   Pulse 90   Temp 98  F (36.7  C) (Oral)   Resp 16   Ht 1.778 m (5' 10\")   Wt 145.2 kg (320 lb)   SpO2 97%   BMI 45.92 kg/m      "

## 2019-04-07 NOTE — PROGRESS NOTES
[facility]    Medicine Progress Note - Hospitalist Service, Gold 8       Date of Admission:  4/4/2019  Assessment & Plan     # Left 4th finger cellulitis, less likely flexor tenosynovitis  Improving slowly. S/P bedside I&D by ortho. Blood cultures collected 4/4 without growth. Erythematous area smaller than yesterday  though still with significant swelling.   - Ortho continues to monitor. No plans for OR at this time.   - Continue vanc and zosyn for now  - If exam continues to improve, would narrow to clindamycin which would cover MRSA and strept infections (in 1-2 days)  - CBC and CRP in AM.   - Pain: APAP increase from 650 q4hr (pt taking consistently), oxycodone 5-10 q4 PRN      # Diarrhea  C. Diff negative on 4/6. Likely due to antibiotics.     # Lymphopenia, sexual exposures, no recent screening  - HIV pending. Wouldn't affect treatment plan for cellulitis.      Holding ASA. Continue home lisinopril, metformin, venlafaxin.      Lines: PIV in right AC  Family/friends: to be updated by patient  Diet: Regular Diet Adult    DVT Prophylaxis: Ambulate every shift  Meléndez Catheter: not present  Code Status: Full Code      Disposition Plan   Expected discharge: 2 - 3 days, recommended to prior living arrangement once antibiotic plan established.  Entered: Eloisa Garcia MD 04/07/2019, 12:26 PM       The patient's care was discussed with the Patient.    Eloisa Garcia MD  Hospitalist Service, [address]  Pager: [phone]  Please see sticky note for cross cover information  ______________________________________________________________________    Interval History   No acute events overnight. The pain is still significant. Also feeling fatigued. We discussed the pain meds likely contributing to the fatigue and pt understands.   4 pt ROS otherwise negative.     Data reviewed today: I reviewed all medications, new labs and  imaging results over the last 24 hours. I personally reviewed no images or EKG's today.    Physical Exam   Vital Signs: Temp: 98  F (36.7  C) Temp src: Oral BP: 128/68 Pulse: 59   Resp: 16 SpO2: 96 % O2 Device: None (Room air)    Weight: 320 lbs 0 oz  Gen: sleeping, not getting up for interview, eyes closed throughout but answers all questions appropriately, pleasant  CVS: rrr no m/r/g. No pedal edema  Resp: CTAB  Abd: obese, nontender, NABS  Erythematous area on the volar hand is again smaller than the outline, and smaller than yesterday. Significant swelling present and some discoloration from the iodine wash. The finger is tender to palpation but not equisitely tender with passive ROM.     Data    --> 52  WBC normal  BMP stable with resolution in bicarb

## 2019-04-07 NOTE — PROGRESS NOTES
"Orthopaedic Surgery Progress Note   April 5, 2019    Subjective: No acute events overnight. Pain improving. Marked improvement of dorsal hand erythema, NPO since midnight. Denies fever or chills, CP, SOB, numbness or tingling, motor dysfunction or weakness.     Objective: /73 (BP Location: Right arm)   Pulse 54   Temp 98.2  F (36.8  C) (Oral)   Resp 16   Ht 1.778 m (5' 10\")   Wt 145.2 kg (320 lb)   SpO2 97%   BMI 45.92 kg/m      General: NAD, sleeping in bed, arousable, cooperative with exam.  Cardio: extremities wwp.   Respiratory: Non-labored breathing on room air.  MSK:   LUE:   Erythema and swelling markedly improved since yesterday on the ring finger and on the hand dorsum.  Dorsal skin incision with minimal drainage.  Left ring finger is swollen from approximately the middle phalanx and proximally to the dorsum of the hand all the way to the carpus.  Patient continues to have volar and dorsal ring finger TTP and pain w/ ROM, though this is improved from prior.  Fingers wwp, radial pulse palpable  Fires FPL/EPL/intrinsics  SILT in r/u/m nerve distributions    Labs:  Hemoglobin   Date Value Ref Range Status   04/06/2019 12.0 (L) 13.3 - 17.7 g/dL Final   ]  All cultures:  Recent Labs   Lab 04/04/19  1325   CULT No growth after 2 days     CRP pending  WBC pending    Assessment:   38 year old male w/ PMH homelessness, DM, HTN, polysubstance use presenting w/ LEFT hand cellulitis on whom ortho is consulted to rule out flexor tenosynovitis of the ring finger; no concern for FT at this time and patient is now s/p bedside I&D of dorsal left ring finger w/ Dr. Bui on 4/5.    4/5: Purulence was released at bedside I&D and the patient did not have a large abscess on ultrasound and his symptoms are not characteristic for FT; therefore best diagnosis at this time continues to be cellulitis.     Patient's examination continues to improve mildly improve day by day since his admission on " Thursday.    Plan:  Medicine primary, appreciate cares    - Removable splint placed 4/6 for comfort, okay to remove for hygiene, if too tight, or irritating.  - WBAT, ROM as tolerated of LUE  - Mild improvement in exam from yesterday.  - Okay for diet today, no plans for OR.  - TID soaks with iodine in water diluted to a tea color.  - Anticipate patient will continue to improve with pain control and IV ABx  - ABx per primary  - Dispo per primary    Wilber Hodge MD  Orthopaedic Surgery, PGY-1  Pager: [phone]       For questions about this patient during the day, please attempt to contact me at my pager ([phone]) prior to contacting the Orthopaedic Surgery resident on call. Thank you!

## 2019-04-08 LAB
CRP SERPL-MCNC: 32 MG/L (ref 0–8)
ERYTHROCYTE [DISTWIDTH] IN BLOOD BY AUTOMATED COUNT: 12.4 % (ref 10–15)
HCT VFR BLD AUTO: 37.9 % (ref 40–53)
HGB BLD-MCNC: 12.1 G/DL (ref 13.3–17.7)
HIV 1+2 AB+HIV1 P24 AG SERPL QL IA: NONREACTIVE
MCH RBC QN AUTO: 31.3 PG (ref 26.5–33)
MCHC RBC AUTO-ENTMCNC: 31.9 G/DL (ref 31.5–36.5)
MCV RBC AUTO: 98 FL (ref 78–100)
PLATELET # BLD AUTO: 143 10E9/L (ref 150–450)
RBC # BLD AUTO: 3.86 10E12/L (ref 4.4–5.9)
WBC # BLD AUTO: 3.6 10E9/L (ref 4–11)

## 2019-04-08 PROCEDURE — 25800030 ZZH RX IP 258 OP 636

## 2019-04-08 PROCEDURE — 86140 C-REACTIVE PROTEIN: CPT | Performed by: INTERNAL MEDICINE

## 2019-04-08 PROCEDURE — 12000001 ZZH R&B MED SURG/OB UMMC

## 2019-04-08 PROCEDURE — 25000128 H RX IP 250 OP 636

## 2019-04-08 PROCEDURE — 25000128 H RX IP 250 OP 636: Performed by: NURSE PRACTITIONER

## 2019-04-08 PROCEDURE — 25000132 ZZH RX MED GY IP 250 OP 250 PS 637: Performed by: NURSE PRACTITIONER

## 2019-04-08 PROCEDURE — 25800030 ZZH RX IP 258 OP 636: Performed by: NURSE PRACTITIONER

## 2019-04-08 PROCEDURE — 40000556 ZZH STATISTIC PERIPHERAL IV START W US GUIDANCE

## 2019-04-08 PROCEDURE — 36415 COLL VENOUS BLD VENIPUNCTURE: CPT | Performed by: INTERNAL MEDICINE

## 2019-04-08 PROCEDURE — 85027 COMPLETE CBC AUTOMATED: CPT | Performed by: INTERNAL MEDICINE

## 2019-04-08 PROCEDURE — 25000132 ZZH RX MED GY IP 250 OP 250 PS 637: Performed by: PHYSICIAN ASSISTANT

## 2019-04-08 PROCEDURE — 99233 SBSQ HOSP IP/OBS HIGH 50: CPT | Performed by: INTERNAL MEDICINE

## 2019-04-08 RX ADMIN — PIPERACILLIN AND TAZOBACTAM 3.38 G: 3; .375 INJECTION, POWDER, FOR SOLUTION INTRAVENOUS at 21:24

## 2019-04-08 RX ADMIN — VANCOMYCIN HYDROCHLORIDE 2000 MG: 10 INJECTION, POWDER, LYOPHILIZED, FOR SOLUTION INTRAVENOUS at 03:06

## 2019-04-08 RX ADMIN — LISINOPRIL 40 MG: 20 TABLET ORAL at 09:15

## 2019-04-08 RX ADMIN — OXYCODONE HYDROCHLORIDE 10 MG: 5 TABLET ORAL at 15:05

## 2019-04-08 RX ADMIN — VANCOMYCIN HYDROCHLORIDE 2000 MG: 10 INJECTION, POWDER, LYOPHILIZED, FOR SOLUTION INTRAVENOUS at 15:05

## 2019-04-08 RX ADMIN — METFORMIN HYDROCHLORIDE 750 MG: 750 TABLET, EXTENDED RELEASE ORAL at 17:24

## 2019-04-08 RX ADMIN — ACETAMINOPHEN 650 MG: 325 TABLET, FILM COATED ORAL at 10:18

## 2019-04-08 RX ADMIN — OXYCODONE HYDROCHLORIDE 10 MG: 5 TABLET ORAL at 21:24

## 2019-04-08 RX ADMIN — PIPERACILLIN AND TAZOBACTAM 3.38 G: 3; .375 INJECTION, POWDER, FOR SOLUTION INTRAVENOUS at 02:14

## 2019-04-08 RX ADMIN — OXYCODONE HYDROCHLORIDE 10 MG: 5 TABLET ORAL at 08:40

## 2019-04-08 RX ADMIN — VENLAFAXINE HYDROCHLORIDE 150 MG: 150 CAPSULE, EXTENDED RELEASE ORAL at 09:53

## 2019-04-08 RX ADMIN — OXYCODONE HYDROCHLORIDE 10 MG: 5 TABLET ORAL at 03:57

## 2019-04-08 RX ADMIN — SODIUM CHLORIDE: 9 INJECTION, SOLUTION INTRAVENOUS at 12:26

## 2019-04-08 RX ADMIN — PIPERACILLIN AND TAZOBACTAM 3.38 G: 3; .375 INJECTION, POWDER, FOR SOLUTION INTRAVENOUS at 09:16

## 2019-04-08 RX ADMIN — PIPERACILLIN AND TAZOBACTAM 3.38 G: 3; .375 INJECTION, POWDER, FOR SOLUTION INTRAVENOUS at 14:21

## 2019-04-08 ASSESSMENT — ACTIVITIES OF DAILY LIVING (ADL)
ADLS_ACUITY_SCORE: 10

## 2019-04-08 ASSESSMENT — PAIN DESCRIPTION - DESCRIPTORS: DESCRIPTORS: ACHING;TENDER;THROBBING

## 2019-04-08 NOTE — PLAN OF CARE
"A&O, VSS. Cellulitis to 4th digit of left hand, erythema and swelling present but improving, Ortho surg in early this am, no surgery planned and Regular diet orders. Splint removed by patient, ok per Ortho Surg Resident. Pain to left 4th finger/hand controlled with Oxycodone 10 mg prn, and alternating with Tylenol. Iodine hand soak done this afternoon. Tolerating regular diet. Metformin give with supper. Ambulates in room independently and voiding spontaneously. Patient  Took shower this afternoon, states feeling very tired and has been sleeping most of the shift.   /87 (BP Location: Right arm)   Pulse 67   Temp 97.8  F (36.6  C) (Oral)   Resp 16   Ht 1.778 m (5' 10\")   Wt 145.2 kg (320 lb)   SpO2 97%   BMI 45.92 kg/m      "

## 2019-04-08 NOTE — PROGRESS NOTES
"Orthopaedic Surgery Progress Note   April 5, 2019    Subjective: No acute events overnight. Pain improving. Marked improvement of dorsal hand erythema. Denies fever or chills, CP, SOB, numbness or tingling, motor dysfunction or weakness.     Objective: /73   Pulse 58   Temp 98  F (36.7  C) (Oral)   Resp 16   Ht 1.778 m (5' 10\")   Wt 145.2 kg (320 lb)   SpO2 98%   BMI 45.92 kg/m      General: NAD, sleeping in bed, arousable, cooperative with exam.  Cardio: extremities wwp.   Respiratory: Non-labored breathing on room air.  MSK:   LUE:   Erythema and swelling markedly improved  Dorsal skin incision with minimal serous drainage, clean.  Left ring finger swelling improved  TTP but improved from prior  Fingers wwp, radial pulse palpable  Fires FPL/EPL/intrinsics  SILT in r/u/m nerve distributions    Labs:  Hemoglobin   Date Value Ref Range Status   04/07/2019 12.1 (L) 13.3 - 17.7 g/dL Final   ]  All cultures:  Recent Labs   Lab 04/04/19  1325   CULT No growth after 4 days     CRP 54 <- 100 <- 100  WBC 4.4 <- 5.1 <- 5.8    Assessment:   38 year old male w/ PMH homelessness, DM, HTN, polysubstance use presenting w/ LEFT hand cellulitis on whom ortho is consulted to rule out flexor tenosynovitis of the ring finger; no concern for FT at this time and patient is now s/p bedside I&D of dorsal left ring finger w/ Dr. Bui on 4/5.    4/5: Purulence was released at bedside I&D and the patient did not have a large abscess on ultrasound and his symptoms are not characteristic for FT; therefore best diagnosis at this time continues to be cellulitis.     Patient's examination continues to improve mildly improve day by day since his admission on Thursday.     Plan:  Medicine primary, appreciate cares    - Removable splint placed 4/6 for comfort, okay to remove for hygiene, if too tight, or irritating.  - WBAT, ROM as tolerated of LUE  - Continues to improve  - TID soaks with iodine in water diluted to a tea color x 1 " week  - Anticipate patient will continue to improve with pain control and IV ABx  - ABx per primary  - Pain per primary  - Dispo per primary    No further orthopaedic interventions at this point, and no ortho follow-up needed. Will follow peripherally while patient continues to be in hospital, please call or page ortho if further questions or concerns arise.    Wilber Hodge MD  Orthopaedic Surgery, PGY-1  Pager: [phone]       For questions about this patient during the day, please attempt to contact me at my pager ([phone]) prior to contacting the Orthopaedic Surgery resident on call. Thank you!

## 2019-04-08 NOTE — PLAN OF CARE
"VSS, cellulitis receeding slightly from outline. Patient report 4th digit remains painful especially to touch. IV antibiotic given as per POC. Patient reports some improvement with edema. Oxycodone and tylenol for pain control.   /88 (BP Location: Right arm)   Pulse 79   Temp 97.8  F (36.6  C) (Oral)   Resp 16   Ht 1.778 m (5' 10\")   Wt 145.2 kg (320 lb)   SpO2 99%   BMI 45.92 kg/m      "

## 2019-04-08 NOTE — PROGRESS NOTES
"The Pt remains in hospital for cellulitis of the left 4th finger. Finger was soaked in Iodine and water. Pt reports of throbbing, tender, aching pain which is  managed with Oxycodone alternating with Tylenol.Continues with Vanco and iv Zosyn. Care clustered and sleeping between cares except for bathroom and pain med.Independently voiding, no acute changes overnight.  /73   Pulse 58   Temp 98  F (36.7  C) (Oral)   Resp 16   Ht 1.778 m (5' 10\")   Wt 145.2 kg (320 lb)   SpO2 98%   BMI 45.92 kg/m       "

## 2019-04-08 NOTE — PROGRESS NOTES
[facility]    Medicine Progress Note - Hospitalist Service, Gold 8       Date of Admission:  4/4/2019  Assessment & Plan     # Left 4th finger cellulitis, less likely flexor tenosynovitis  Improving slowly. S/P bedside I&D by ortho. Blood cultures collected 4/4 without growth. Erythematous area and swelling both continue to decrease.   - Ortho will monitor peripherally  - Continue vanc and zosyn for now  - If exam continues to improve, would narrow to clindamycin which would cover MRSA and strept infections, likely tomorrow  - CBC and CRP in AM.   - Pain: APAP increase from 650 q4hr, oxycodone 5-10 q4 PRN      # Diarrhea  C. Diff negative on 4/6. Likely due to antibiotics.     # Lymphopenia, sexual exposures, no recent screening  - HIV negative. Couselled.   - W/U of lymphopenia per PCP     Holding ASA. Continue home lisinopril, metformin, venlafaxin.      Lines: PIV in right AC  Family/friends: to be updated by patient  Diet: Regular Diet Adult    DVT Prophylaxis: Ambulate every shift  Meléndez Catheter: not present  Code Status: Full Code      Disposition Plan   Expected discharge: 2 - 3 days, recommended to prior living arrangement once antibiotic plan established.  Entered: Eloisa Garcia MD 04/08/2019, 2:55 PM       The patient's care was discussed with the Patient.    Eloisa Garcia MD  Hospitalist Service, [address]  Pager: [phone]  Please see sticky note for cross cover information  ______________________________________________________________________    Interval History   No acute events overnight. Pain and energy level improving. Still with significant discomfort but overall feels like he's getting better. Will try to walk around the [place] more today.     4 pt ROS otherwise negative.     Data reviewed today: I reviewed all medications, new labs and imaging results over the last 24 hours. I personally reviewed  no images or EKG's today.    Physical Exam   Vital Signs: Temp: 97.8  F (36.6  C) Temp src: Oral BP: 135/67 Pulse: 69   Resp: 16 SpO2: 96 % O2 Device: None (Room air)    Weight: 320 lbs 0 oz  Gen: more awake today during the interview.   CVS: rrr no m/r/g. No pedal edema  Resp: CTAB  Abd: obese, nontender, NABS  Erythema now limited to only the finger. Swelling in the hand is minimal though sigificant in the finger. Active ROM improving but still limited. Still quite TTP in the finger.     Data    --> 52 -->32

## 2019-04-09 LAB
CRP SERPL-MCNC: 19 MG/L (ref 0–8)
ERYTHROCYTE [DISTWIDTH] IN BLOOD BY AUTOMATED COUNT: 12.3 % (ref 10–15)
HCT VFR BLD AUTO: 38 % (ref 40–53)
HGB BLD-MCNC: 12.2 G/DL (ref 13.3–17.7)
MCH RBC QN AUTO: 31.2 PG (ref 26.5–33)
MCHC RBC AUTO-ENTMCNC: 32.1 G/DL (ref 31.5–36.5)
MCV RBC AUTO: 97 FL (ref 78–100)
PLATELET # BLD AUTO: 156 10E9/L (ref 150–450)
RBC # BLD AUTO: 3.91 10E12/L (ref 4.4–5.9)
WBC # BLD AUTO: 3.7 10E9/L (ref 4–11)

## 2019-04-09 PROCEDURE — 85027 COMPLETE CBC AUTOMATED: CPT | Performed by: INTERNAL MEDICINE

## 2019-04-09 PROCEDURE — 25000128 H RX IP 250 OP 636

## 2019-04-09 PROCEDURE — 99233 SBSQ HOSP IP/OBS HIGH 50: CPT | Performed by: INTERNAL MEDICINE

## 2019-04-09 PROCEDURE — 25000128 H RX IP 250 OP 636: Performed by: NURSE PRACTITIONER

## 2019-04-09 PROCEDURE — 86140 C-REACTIVE PROTEIN: CPT | Performed by: INTERNAL MEDICINE

## 2019-04-09 PROCEDURE — 12000001 ZZH R&B MED SURG/OB UMMC

## 2019-04-09 PROCEDURE — 25800030 ZZH RX IP 258 OP 636

## 2019-04-09 PROCEDURE — 25000132 ZZH RX MED GY IP 250 OP 250 PS 637: Performed by: NURSE PRACTITIONER

## 2019-04-09 PROCEDURE — 25000132 ZZH RX MED GY IP 250 OP 250 PS 637: Performed by: PHYSICIAN ASSISTANT

## 2019-04-09 PROCEDURE — 36415 COLL VENOUS BLD VENIPUNCTURE: CPT | Performed by: INTERNAL MEDICINE

## 2019-04-09 PROCEDURE — 25000132 ZZH RX MED GY IP 250 OP 250 PS 637: Performed by: INTERNAL MEDICINE

## 2019-04-09 RX ORDER — SULFAMETHOXAZOLE/TRIMETHOPRIM 800-160 MG
1 TABLET ORAL 2 TIMES DAILY
Status: DISCONTINUED | OUTPATIENT
Start: 2019-04-09 | End: 2019-04-09

## 2019-04-09 RX ORDER — SULFAMETHOXAZOLE/TRIMETHOPRIM 800-160 MG
1 TABLET ORAL 2 TIMES DAILY
Status: DISCONTINUED | OUTPATIENT
Start: 2019-04-09 | End: 2019-04-10 | Stop reason: HOSPADM

## 2019-04-09 RX ADMIN — ACETAMINOPHEN 650 MG: 325 TABLET, FILM COATED ORAL at 00:07

## 2019-04-09 RX ADMIN — PIPERACILLIN AND TAZOBACTAM 3.38 G: 3; .375 INJECTION, POWDER, FOR SOLUTION INTRAVENOUS at 02:28

## 2019-04-09 RX ADMIN — OXYCODONE HYDROCHLORIDE 10 MG: 5 TABLET ORAL at 14:14

## 2019-04-09 RX ADMIN — OXYCODONE HYDROCHLORIDE 10 MG: 5 TABLET ORAL at 06:15

## 2019-04-09 RX ADMIN — SULFAMETHOXAZOLE AND TRIMETHOPRIM 1 TABLET: 800; 160 TABLET ORAL at 18:34

## 2019-04-09 RX ADMIN — VANCOMYCIN HYDROCHLORIDE 2000 MG: 10 INJECTION, POWDER, LYOPHILIZED, FOR SOLUTION INTRAVENOUS at 03:32

## 2019-04-09 RX ADMIN — VENLAFAXINE HYDROCHLORIDE 150 MG: 150 CAPSULE, EXTENDED RELEASE ORAL at 07:47

## 2019-04-09 RX ADMIN — SULFAMETHOXAZOLE AND TRIMETHOPRIM 1 TABLET: 800; 160 TABLET ORAL at 09:42

## 2019-04-09 RX ADMIN — LISINOPRIL 40 MG: 20 TABLET ORAL at 07:47

## 2019-04-09 RX ADMIN — AMOXICILLIN AND CLAVULANATE POTASSIUM 1 TABLET: 875; 125 TABLET, FILM COATED ORAL at 17:16

## 2019-04-09 RX ADMIN — METFORMIN HYDROCHLORIDE 750 MG: 750 TABLET, EXTENDED RELEASE ORAL at 17:16

## 2019-04-09 RX ADMIN — ACETAMINOPHEN 650 MG: 325 TABLET, FILM COATED ORAL at 15:43

## 2019-04-09 RX ADMIN — OXYCODONE HYDROCHLORIDE 10 MG: 5 TABLET ORAL at 18:34

## 2019-04-09 ASSESSMENT — ACTIVITIES OF DAILY LIVING (ADL)
ADLS_ACUITY_SCORE: 10

## 2019-04-09 ASSESSMENT — PAIN DESCRIPTION - DESCRIPTORS: DESCRIPTORS: ACHING;CONSTANT;DISCOMFORT

## 2019-04-09 NOTE — PROGRESS NOTES
[facility]    Medicine Progress Note - Hospitalist Service, Gold 8       Date of Admission:  4/4/2019    Assessment & Plan     # Left 4th finger cellulitis (ruled out flexor tenosynovitis)  Improving. S/P bedside I&D 4/5 by ortho. Blood cultures 4/4 NGTD. Erythematous area and swelling both continue to decrease.   - Ortho signed off but will teach splint so he can apply it at home (difficult since lt handed)  - vanc and zosyn--> bactrim po to cover MRSA well (purulent, severe infection and tila had MRSA skin infection in past)  - CBC and CRP in AM  - acetaminophen 650 q4hr, oxycodone 5-10 q4 PRN      # Diarrhea- from antibiotics, one episode of loose stools/day  - C. Diff negative on 4/6.     # Lymphopenia, sexual exposures, no recent screening  - HIV negative. Couselled.   - W/U of lymphopenia per PCP     Holding ASA. Continue home lisinopril, metformin, venlafaxine     Lines: none  Family/friends: to be updated by patient  Diet: Regular Diet Adult    DVT Prophylaxis: Ambulate every shift  Meléndez Catheter: not present  Code Status: Full Code      Disposition Plan   Expected discharge: Tomorrow, recommended to prior living arrangement as long as cellulitis continues to improve on po abx (with a few days of oxycodone and close and quick f/u with pcp).    Entered: Lashonda Blanc MD 04/09/2019, 11:31 AM       The patient's care was discussed with the Patient.    Lashonda Blanc MD  Hospitalist Service, [address]  Pager: [phone]  Please see sticky note for cross cover information  ______________________________________________________________________    Interval History   No acute events overnight. Girma is concerned that he will have a lot of pain at home and he doesn't know how to reapply the splint (tough because he's left handed).  The redness and swelling are quite a bit better today.     4 pt ROS otherwise negative.      Data reviewed today: I reviewed all medications, new labs and imaging results over the last 24 hours. I personally reviewed no images or EKG's today.    Physical Exam   Vital Signs: Temp: 98.1  F (36.7  C) Temp src: Oral BP: 132/81 Pulse: 57   Resp: 16 SpO2: 96 % O2 Device: None (Room air)    Weight: 320 lbs 0 oz  Gen: alert, comfortably sitting in bed  CVS: rrr no m/r/g. No pedal edema lara  Resp: CTAB, no w/c, no increased work of breathing  Abd: obese, nontender, + BS  Erythema still only on his finger. Purulence oozing from cut in finger (just after iodine bath). Swelling in the hand continues, most sigificant in the finger. Active ROM improving but still limited. Still very tender in finger.     Data    --> 52 -->32-->19

## 2019-04-09 NOTE — PROGRESS NOTES
Neuro: A&Ox4.   Cardiac: Afebrile, VSS.            Respiratory: RA    GI/: Voiding spontaneously. Loose BM this shift- C. Diff sent results negative 4/6/19.   Diet/appetite: Regular diet. Denies n/v  Activity: Up ad jazmine, independent.   Pain: . Yes, Left hand 4th digit controlled with PRN oxycodone and tylenol.  Skin: s/p I&D left hand 4th digit by ortho 4/5/19. Wound care done x 2.   Lines: No PIV. MD aware.     Patient resting comfortable in between cares. Able to make needs known. Started oral antibiotic today tolerated well. Plan to observe patient overnight and  discharge tomorrow.

## 2019-04-09 NOTE — PLAN OF CARE
"Pt 4th digit still red and painful. IV zosyn and Vancomycin given. Currently no IV Access, fell out when pt was moving around this morning. Oxycodone given for pain.   /81 (BP Location: Right arm)   Pulse 57   Temp 98.1  F (36.7  C) (Oral)   Resp 16   Ht 1.778 m (5' 10\")   Wt 145.2 kg (320 lb)   SpO2 96%   BMI 45.92 kg/m     "

## 2019-04-10 VITALS
DIASTOLIC BLOOD PRESSURE: 74 MMHG | SYSTOLIC BLOOD PRESSURE: 127 MMHG | RESPIRATION RATE: 16 BRPM | WEIGHT: 315 LBS | HEART RATE: 59 BPM | BODY MASS INDEX: 45.1 KG/M2 | TEMPERATURE: 98 F | HEIGHT: 70 IN | OXYGEN SATURATION: 98 %

## 2019-04-10 LAB
BACTERIA SPEC CULT: NO GROWTH
CRP SERPL-MCNC: 12 MG/L (ref 0–8)
ERYTHROCYTE [DISTWIDTH] IN BLOOD BY AUTOMATED COUNT: 12.2 % (ref 10–15)
HCT VFR BLD AUTO: 41.2 % (ref 40–53)
HGB BLD-MCNC: 13.3 G/DL (ref 13.3–17.7)
Lab: NORMAL
MCH RBC QN AUTO: 31.2 PG (ref 26.5–33)
MCHC RBC AUTO-ENTMCNC: 32.3 G/DL (ref 31.5–36.5)
MCV RBC AUTO: 97 FL (ref 78–100)
PLATELET # BLD AUTO: 167 10E9/L (ref 150–450)
RBC # BLD AUTO: 4.26 10E12/L (ref 4.4–5.9)
SPECIMEN SOURCE: NORMAL
WBC # BLD AUTO: 5.1 10E9/L (ref 4–11)

## 2019-04-10 PROCEDURE — 25000132 ZZH RX MED GY IP 250 OP 250 PS 637: Performed by: PHYSICIAN ASSISTANT

## 2019-04-10 PROCEDURE — 36415 COLL VENOUS BLD VENIPUNCTURE: CPT | Performed by: INTERNAL MEDICINE

## 2019-04-10 PROCEDURE — 85004 AUTOMATED DIFF WBC COUNT: CPT | Performed by: INTERNAL MEDICINE

## 2019-04-10 PROCEDURE — 86140 C-REACTIVE PROTEIN: CPT | Performed by: INTERNAL MEDICINE

## 2019-04-10 PROCEDURE — 25000132 ZZH RX MED GY IP 250 OP 250 PS 637: Performed by: INTERNAL MEDICINE

## 2019-04-10 PROCEDURE — 85027 COMPLETE CBC AUTOMATED: CPT | Performed by: INTERNAL MEDICINE

## 2019-04-10 PROCEDURE — 25000132 ZZH RX MED GY IP 250 OP 250 PS 637: Performed by: NURSE PRACTITIONER

## 2019-04-10 RX ORDER — OXYCODONE HYDROCHLORIDE 5 MG/1
5-10 TABLET ORAL EVERY 4 HOURS PRN
Qty: 24 TABLET | Refills: 0 | Status: ON HOLD | OUTPATIENT
Start: 2019-04-10 | End: 2019-05-08

## 2019-04-10 RX ORDER — SULFAMETHOXAZOLE/TRIMETHOPRIM 800-160 MG
1 TABLET ORAL 2 TIMES DAILY
Qty: 11 TABLET | Refills: 0 | Status: ON HOLD | OUTPATIENT
Start: 2019-04-10 | End: 2019-05-08

## 2019-04-10 RX ORDER — LISINOPRIL 40 MG/1
40 TABLET ORAL DAILY
COMMUNITY
Start: 2019-04-10 | End: 2020-12-21

## 2019-04-10 RX ADMIN — AMOXICILLIN AND CLAVULANATE POTASSIUM 1 TABLET: 875; 125 TABLET, FILM COATED ORAL at 07:38

## 2019-04-10 RX ADMIN — ACETAMINOPHEN 650 MG: 325 TABLET, FILM COATED ORAL at 02:16

## 2019-04-10 RX ADMIN — ACETAMINOPHEN 650 MG: 325 TABLET, FILM COATED ORAL at 08:32

## 2019-04-10 RX ADMIN — VENLAFAXINE HYDROCHLORIDE 150 MG: 150 CAPSULE, EXTENDED RELEASE ORAL at 07:38

## 2019-04-10 RX ADMIN — SULFAMETHOXAZOLE AND TRIMETHOPRIM 1 TABLET: 800; 160 TABLET ORAL at 07:37

## 2019-04-10 RX ADMIN — LISINOPRIL 40 MG: 20 TABLET ORAL at 07:37

## 2019-04-10 RX ADMIN — OXYCODONE HYDROCHLORIDE 10 MG: 5 TABLET ORAL at 07:37

## 2019-04-10 RX ADMIN — OXYCODONE HYDROCHLORIDE 10 MG: 5 TABLET ORAL at 02:16

## 2019-04-10 ASSESSMENT — ACTIVITIES OF DAILY LIVING (ADL)
ADLS_ACUITY_SCORE: 10

## 2019-04-10 ASSESSMENT — PAIN DESCRIPTION - DESCRIPTORS: DESCRIPTORS: ACHING;DISCOMFORT

## 2019-04-10 NOTE — PLAN OF CARE
"Pt AOx4, VSS, pain improving w/ oral oxy + tylenol. Redness and swelling to left hand fourth digit improving. Iodine soak completed x1 overnight. Pt now on oral ABs which he is tolerating. No diarrhea overnight, and pt tolerating regular diet, voiding spontaneously. Lungs clear to auscultation and S1, S2 audible. Will continue with plan of care.    /89 (BP Location: Right arm)   Pulse 79   Temp 98.2  F (36.8  C) (Oral)   Resp 16   Ht 1.778 m (5' 10\")   Wt 145.2 kg (320 lb)   SpO2 96%   BMI 45.92 kg/m      "

## 2019-04-10 NOTE — PROGRESS NOTES
DC instructions given to pt, verbalized understanding.  All belongings with pt, IV DC'd and documented. Ambulated to discharge pharmacy and then main lobby for discharge.

## 2019-04-10 NOTE — PROGRESS NOTES
Pt A&O x4, VSS, pain well controlled with tylenol and oxycodone. Iodine soak completed this morning, hand and arm wrapped with guaze and ace wrap. Tolerating oral antibiotics. Up independently, tolerating regular diet.

## 2019-04-10 NOTE — PROGRESS NOTES
Social Work Services Progress Note    Hospital Day: 7  Collaborated with:  Pt, chart review, medical team    Data:  Pt is a 38 year old male admitted to [facility] on 4/4/19 with a finger injury and cellulitis.  Pt from [facility].    SW was informed during team rounds that Pt is ready for discharge today.  SW reviewed chart and primary unit SW was going to follow up with Pt about transportation and other needs.    SW met with Pt to discuss discharge plans.  Pt said that his father would pick him up and bring him back home whenever he is ready.  SW said that it sounds like he is ready to discharge, lets plan on 10:30am.  Pt asked if his medications would be ready.  SW said that per the charge nurse, his orders and medications are ready to go, a nurse will come in and go through his discharge paperwork with him.  Pt asked about transportation to and from his follow up medical appointments and if he can just call his insurance about a cab ride, they usually send him bus tickets.  SW asked if Pt has a care coordinator through [employer].  Pt said that he doesn't.  SW recommended that he call them and request one and discussed the benefits of having one, just to have the connection.  SW said to try calling and explain that he just got out of the hospital and they may approve a few cab rides.      Pt called his father to pick him up this morning, SW let charge nurse know Pt ready for discharge at 1030.      Intervention:  Discharge planning, community resources support    Assessment:  Pt has [employer] MA, would be eligible for a community care coordinator, has family support as well    Plan:    Anticipated Disposition:  Home, no needs identified    Barriers to d/c plan:  none    Follow Up:  None needed at this time    JULY Adkins, MAGALIE Tiwari

## 2019-04-10 NOTE — DISCHARGE SUMMARY
[facility], [place]  Hospitalist Discharge Summary       Date of Admission:  4/4/2019  Date of Discharge:  4/10/2019  Discharging Provider: Lashonda Blanc MD  Discharge Team: Hospitalist Service, Gold 8    Discharge Diagnoses   Lt 4th finger cellulitis  Diarrhea- from antibiotics  Chronic hypertension  Diabetes Mellitus 2  Anxiety    Follow-ups Needed After Discharge    F/u with PCP (Omayra Gunn) within 5 days to assess improvement with po pain meds (order CRP prn, check to see if pain meds are needed prn).  F/u with ortho 1 wk from discharge if the finger isn't almost completely back to baseline- or sooner if it worsens    Unresulted Labs Ordered in the Past 30 Days of this Admission     No orders found from 2/3/2019 to 4/5/2019.        Discharge Disposition   Discharged to home  Condition at discharge: Good    Hospital Course      # Left 4th finger cellulitis   Girma presented with an erythematous, swollen, exquisitely painful finger (worsening for a few days at home) after he tried to removed a splinter on 4/3.  The erythema traveled up his arm, and he began to feel diaphoretic prior to admission. He had bedside incision and drainage in the ED by ortho, with a 4 mm incision to the dorsal aspect of his left 4th digit. Ortho ordered an ultrasound, which didn't find a significant fluid collection, but he did have a second bedside incision and drainage on 4/5 due to closing of the previous incision and some purulent drainage.  This was left open to drain with the recommendation that he begin TID iodine bath soaks (iodine diluted to a tea color). There was question of flexor tendon synovitis on presentation, but ortho found no evidence of FT during his hospitalization. He was given a resting extension splint to be worn between soaks. He was given opioid pain control and vancomycin/piptazo on admission through 4/8, and he was converted to SMX-TMP + Amox-Clav for MRSA and good  strep coverage.  Antibiotics were prescribed to complete a 10 day course, as he was still improving but not yet back to baseline after 5 days of antibiotics.  CRP trended down steadily throughout his hospitalization. His pain was controlled with acetaminophen and oxycodone 5-10 q4 PRN, and he was discharged on this pain med regimen. He will see his PCP Omayra Gunn within 5 days to have a physical examination of his finger and talk to her about needs for additional pain meds.     # Diarrhea- Girma had 1 episode of loose stools each day from admission, and a c diff toxin was negative on 4/6. His diarrhea didn't worsen prior to discharge, and he was advised to try yogurt at home if his diarrhea worsens with initiation of his smx-tmp and amox-clav.     # Sexual exposures, no recent screening- Girma had a negative HIV Agn/Ab, and he had lymphopenia on admission that resolved prior to discharge. His WBC decreased after a few days on piptazo, but it was back to normal prior to discharge.     # Chronic hypertension, DM2, anxiety: ASA was held on admission but restarted on d/c. Home meds of lisinopril, metformin, and venlafaxine were continued.    Consultations This Hospital Stay   PHARMACY TO DOSE [facility]  ORTHOPAEDIC SURGERY ADULT/PEDS IP CONSULT  VASCULAR ACCESS CARE ADULT IP CONSULT    Code Status   Full Code     Time Spent on this Encounter   ILashonda, personally saw the patient today and spent greater than 30 minutes discharging this patient.       Lashonda Blanc MD   [phone]  [facility], [place]  ______________________________________________________________________    Physical Exam   Vital Signs: Temp: 98  F (36.7  C) Temp src: Oral BP: 127/74 Pulse: 59 Heart Rate: 54 Resp: 16 SpO2: 98 % O2 Device: None (Room air)    Weight: 320 lbs 0 oz  Gen: alert, comfortably sitting in bed  CVS: rrr no m/r/g. No pedal edema lara  Resp: CTAB, no w/c, no increased work of  breathing  Abd: obese, nontender, + BS  Erythema decreased and only on his finger, scab/dried secretions from cut in finger, swelling in the hand remains but is greatly decreased, most sigificant swelling is in the finger- but decreased. Active ROM improving but still limited.  in finger.        Primary Care Physician   [facility]    Discharge Orders      Reason for your hospital stay    You were admitted with cellulitis (infection of your skin) on your left ring finger.     Follow Up and recommended labs and tests    Follow up with primary care provider at [facility], within 5 days for hospital follow- up.  No follow up labs or test are needed.      Follow up with ortho if your finger isn't still improving 1 wk from discharge.     Activity    Use the splint for comfort, may remove if it gets too tight/uncomfortable.  Continue iodine soaks for 1 week (three times each day)     Full Code     Diet    Diabetic diet     Significant Results and Procedures   Most Recent 3 CBC's:  Recent Labs   Lab Test 04/10/19  0621 04/09/19  0652 04/08/19  0606   WBC 5.1 3.7* 3.6*   HGB 13.3 12.2* 12.1*   MCV 97 97 98    156 143*     Most Recent 3 BMP's:  Recent Labs   Lab Test 04/07/19  0739 04/06/19  0612 04/05/19  0648    139 139   POTASSIUM 4.1 4.3 3.8   CHLORIDE 111* 112* 108   CO2 26 19* 24   BUN 10 12 15   CR 1.01 1.07 1.09   ANIONGAP 5 8 7   KEISHA 8.8 8.0* 8.5   * 128* 131*     Most Recent ESR & CRP:  Recent Labs   Lab Test 04/10/19  0621 04/06/19  0612   SED  --   --  27*   CRP 12.0*   < > 100.0*    < > = values in this interval not displayed.       Discharge Medications   Discharge Medication List as of 4/10/2019  9:25 AM      START taking these medications    Details   amoxicillin-clavulanate (AUGMENTIN) 875-125 MG tablet Take 1 tablet by mouth every 12 hours for 11 doses, Disp-11 tablet, R-0, E-Prescribe      oxyCODONE (ROXICODONE) 5 MG tablet  Take 1-2 tablets (5-10 mg) by mouth every 4 hours as needed for moderate to severe pain, Disp-24 tablet, R-0, Local Print      sulfamethoxazole-trimethoprim (BACTRIM DS/SEPTRA DS) 800-160 MG tablet Take 1 tablet by mouth 2 times daily for 11 doses, Disp-11 tablet, R-0, E-Prescribe         CONTINUE these medications which have CHANGED    Details   lisinopril (PRINIVIL/ZESTRIL) 40 MG tablet Take 1 tablet (40 mg) by mouth daily, Historical         CONTINUE these medications which have NOT CHANGED    Details   ASPIRIN PO Take 81 mg by mouth daily , Historical      hydrOXYzine (ATARAX) 50 MG tablet Take 25 mg by mouth, Historical      loratadine (CLARITIN) 10 MG tablet Take 10 mg by mouth, Historical      metFORMIN (GLUCOPHAGE-XR) 750 MG 24 hr tablet Take 750 mg by mouth, Historical      venlafaxine (EFFEXOR-XR) 150 MG 24 hr capsule Take 150 mg by mouth, Historical           Allergies   Allergies   Allergen Reactions     Hydrocodone Rash     No problems with oxycodone and hydromorphone.     Pregabalin      Foot swelling     No Clinical Screening - See Comments      Other reaction(s): Runny Nose     Vilazodone Other (See Comments)     Blurred vision, racing heart

## 2019-05-06 ENCOUNTER — HOSPITAL ENCOUNTER (OUTPATIENT)
Facility: CLINIC | Age: 39
Setting detail: OBSERVATION
Discharge: HOME | End: 2019-05-08
Attending: INTERNAL MEDICINE | Admitting: INTERNAL MEDICINE
Payer: COMMERCIAL

## 2019-05-06 DIAGNOSIS — L03.90 CELLULITIS, UNSPECIFIED CELLULITIS SITE: ICD-10-CM

## 2019-05-06 DIAGNOSIS — E11.9 TYPE 2 DIABETES MELLITUS WITHOUT COMPLICATION, WITHOUT LONG-TERM CURRENT USE OF INSULIN (H): ICD-10-CM

## 2019-05-06 DIAGNOSIS — L08.9 SKIN INFECTION: Primary | ICD-10-CM

## 2019-05-06 DIAGNOSIS — J34.0 CELLULITIS OF NOSE: ICD-10-CM

## 2019-05-06 DIAGNOSIS — A49.02 RESISTANCE TO METHICILLIN: ICD-10-CM

## 2019-05-06 DIAGNOSIS — L03.211 FACIAL CELLULITIS: ICD-10-CM

## 2019-05-06 LAB
ALBUMIN SERPL-MCNC: 3.4 G/DL (ref 3.4–5)
ALP SERPL-CCNC: 54 U/L (ref 40–150)
ALT SERPL W P-5'-P-CCNC: 26 U/L (ref 0–70)
ANION GAP SERPL CALCULATED.3IONS-SCNC: 10 MMOL/L (ref 3–14)
AST SERPL W P-5'-P-CCNC: 12 U/L (ref 0–45)
BASOPHILS # BLD AUTO: 0 10E9/L (ref 0–0.2)
BASOPHILS NFR BLD AUTO: 0.3 %
BILIRUB SERPL-MCNC: 0.9 MG/DL (ref 0.2–1.3)
BUN SERPL-MCNC: 16 MG/DL (ref 7–30)
CALCIUM SERPL-MCNC: 8.8 MG/DL (ref 8.5–10.1)
CHLORIDE SERPL-SCNC: 102 MMOL/L (ref 94–109)
CO2 SERPL-SCNC: 26 MMOL/L (ref 20–32)
CREAT SERPL-MCNC: 1.07 MG/DL (ref 0.66–1.25)
CRP SERPL-MCNC: 35 MG/L (ref 0–8)
DIFFERENTIAL METHOD BLD: ABNORMAL
EOSINOPHIL # BLD AUTO: 0.1 10E9/L (ref 0–0.7)
EOSINOPHIL NFR BLD AUTO: 0.6 %
ERYTHROCYTE [DISTWIDTH] IN BLOOD BY AUTOMATED COUNT: 12.3 % (ref 10–15)
ERYTHROCYTE [SEDIMENTATION RATE] IN BLOOD BY WESTERGREN METHOD: 29 MM/H (ref 0–15)
GFR SERPL CREATININE-BSD FRML MDRD: 86 ML/MIN/{1.73_M2}
GLUCOSE BLDC GLUCOMTR-MCNC: 116 MG/DL (ref 70–99)
GLUCOSE BLDC GLUCOMTR-MCNC: 152 MG/DL (ref 70–99)
GLUCOSE BLDC GLUCOMTR-MCNC: 208 MG/DL (ref 70–99)
GLUCOSE SERPL-MCNC: 262 MG/DL (ref 70–99)
HBA1C MFR BLD: 6.9 % (ref 0–5.6)
HCT VFR BLD AUTO: 36.1 % (ref 40–53)
HGB BLD-MCNC: 12 G/DL (ref 13.3–17.7)
IMM GRANULOCYTES # BLD: 0 10E9/L (ref 0–0.4)
IMM GRANULOCYTES NFR BLD: 0.3 %
INR PPP: 1.13 (ref 0.86–1.14)
LACTATE BLD-SCNC: 1.6 MMOL/L (ref 0.7–2)
LYMPHOCYTES # BLD AUTO: 1.2 10E9/L (ref 0.8–5.3)
LYMPHOCYTES NFR BLD AUTO: 14.8 %
MCH RBC QN AUTO: 30.5 PG (ref 26.5–33)
MCHC RBC AUTO-ENTMCNC: 33.2 G/DL (ref 31.5–36.5)
MCV RBC AUTO: 92 FL (ref 78–100)
MONOCYTES # BLD AUTO: 0.5 10E9/L (ref 0–1.3)
MONOCYTES NFR BLD AUTO: 6.6 %
MRSA DNA SPEC QL NAA+PROBE: POSITIVE
NEUTROPHILS # BLD AUTO: 6.1 10E9/L (ref 1.6–8.3)
NEUTROPHILS NFR BLD AUTO: 77.4 %
NRBC # BLD AUTO: 0 10*3/UL
NRBC BLD AUTO-RTO: 0 /100
PLATELET # BLD AUTO: 161 10E9/L (ref 150–450)
POTASSIUM SERPL-SCNC: 3.4 MMOL/L (ref 3.4–5.3)
PROCALCITONIN SERPL-MCNC: <0.05 NG/ML
PROT SERPL-MCNC: 6.8 G/DL (ref 6.8–8.8)
RBC # BLD AUTO: 3.94 10E12/L (ref 4.4–5.9)
SODIUM SERPL-SCNC: 137 MMOL/L (ref 133–144)
SPECIMEN SOURCE: ABNORMAL
WBC # BLD AUTO: 7.9 10E9/L (ref 4–11)

## 2019-05-06 PROCEDURE — 83605 ASSAY OF LACTIC ACID: CPT | Performed by: INTERNAL MEDICINE

## 2019-05-06 PROCEDURE — 25800030 ZZH RX IP 258 OP 636: Performed by: INTERNAL MEDICINE

## 2019-05-06 PROCEDURE — 84145 PROCALCITONIN (PCT): CPT | Performed by: INTERNAL MEDICINE

## 2019-05-06 PROCEDURE — 99285 EMERGENCY DEPT VISIT HI MDM: CPT | Mod: 25

## 2019-05-06 PROCEDURE — 25000128 H RX IP 250 OP 636: Performed by: NURSE PRACTITIONER

## 2019-05-06 PROCEDURE — 25000132 ZZH RX MED GY IP 250 OP 250 PS 637: Performed by: NURSE PRACTITIONER

## 2019-05-06 PROCEDURE — 96375 TX/PRO/DX INJ NEW DRUG ADDON: CPT

## 2019-05-06 PROCEDURE — 96365 THER/PROPH/DIAG IV INF INIT: CPT

## 2019-05-06 PROCEDURE — 87640 STAPH A DNA AMP PROBE: CPT | Mod: XU | Performed by: EMERGENCY MEDICINE

## 2019-05-06 PROCEDURE — 83036 HEMOGLOBIN GLYCOSYLATED A1C: CPT | Performed by: NURSE PRACTITIONER

## 2019-05-06 PROCEDURE — 96372 THER/PROPH/DIAG INJ SC/IM: CPT | Mod: 59

## 2019-05-06 PROCEDURE — 25000128 H RX IP 250 OP 636: Performed by: INTERNAL MEDICINE

## 2019-05-06 PROCEDURE — G0378 HOSPITAL OBSERVATION PER HR: HCPCS

## 2019-05-06 PROCEDURE — 87186 SC STD MICRODIL/AGAR DIL: CPT | Performed by: INTERNAL MEDICINE

## 2019-05-06 PROCEDURE — 96376 TX/PRO/DX INJ SAME DRUG ADON: CPT

## 2019-05-06 PROCEDURE — 86140 C-REACTIVE PROTEIN: CPT | Performed by: INTERNAL MEDICINE

## 2019-05-06 PROCEDURE — 87070 CULTURE OTHR SPECIMN AEROBIC: CPT | Performed by: INTERNAL MEDICINE

## 2019-05-06 PROCEDURE — 85025 COMPLETE CBC W/AUTO DIFF WBC: CPT | Performed by: INTERNAL MEDICINE

## 2019-05-06 PROCEDURE — 87077 CULTURE AEROBIC IDENTIFY: CPT | Performed by: INTERNAL MEDICINE

## 2019-05-06 PROCEDURE — 99219 ZZC INITIAL OBSERVATION CARE,LEVL II: CPT | Mod: Z6 | Performed by: FAMILY MEDICINE

## 2019-05-06 PROCEDURE — 00000146 ZZHCL STATISTIC GLUCOSE BY METER IP

## 2019-05-06 PROCEDURE — 96367 TX/PROPH/DG ADDL SEQ IV INF: CPT

## 2019-05-06 PROCEDURE — 80053 COMPREHEN METABOLIC PANEL: CPT | Performed by: INTERNAL MEDICINE

## 2019-05-06 PROCEDURE — 83036 HEMOGLOBIN GLYCOSYLATED A1C: CPT | Performed by: INTERNAL MEDICINE

## 2019-05-06 PROCEDURE — 85610 PROTHROMBIN TIME: CPT | Performed by: INTERNAL MEDICINE

## 2019-05-06 PROCEDURE — 85652 RBC SED RATE AUTOMATED: CPT | Performed by: INTERNAL MEDICINE

## 2019-05-06 PROCEDURE — 96366 THER/PROPH/DIAG IV INF ADDON: CPT

## 2019-05-06 PROCEDURE — 87641 MR-STAPH DNA AMP PROBE: CPT | Performed by: EMERGENCY MEDICINE

## 2019-05-06 RX ORDER — HYDROMORPHONE HCL/0.9% NACL/PF 0.2MG/0.2
0.2 SYRINGE (ML) INTRAVENOUS EVERY 4 HOURS PRN
Status: DISCONTINUED | OUTPATIENT
Start: 2019-05-06 | End: 2019-05-08

## 2019-05-06 RX ORDER — NICOTINE POLACRILEX 4 MG
15-30 LOZENGE BUCCAL
Status: DISCONTINUED | OUTPATIENT
Start: 2019-05-06 | End: 2019-05-08 | Stop reason: HOSPADM

## 2019-05-06 RX ORDER — ONDANSETRON 4 MG/1
4 TABLET, ORALLY DISINTEGRATING ORAL EVERY 6 HOURS PRN
Status: DISCONTINUED | OUTPATIENT
Start: 2019-05-06 | End: 2019-05-08 | Stop reason: HOSPADM

## 2019-05-06 RX ORDER — CEFAZOLIN SODIUM 2 G/100ML
2 INJECTION, SOLUTION INTRAVENOUS ONCE
Status: COMPLETED | OUTPATIENT
Start: 2019-05-06 | End: 2019-05-06

## 2019-05-06 RX ORDER — HYDROXYZINE HYDROCHLORIDE 25 MG/1
25 TABLET, FILM COATED ORAL 3 TIMES DAILY PRN
Status: DISCONTINUED | OUTPATIENT
Start: 2019-05-06 | End: 2019-05-08 | Stop reason: HOSPADM

## 2019-05-06 RX ORDER — DEXTROSE MONOHYDRATE 25 G/50ML
25-50 INJECTION, SOLUTION INTRAVENOUS
Status: DISCONTINUED | OUTPATIENT
Start: 2019-05-06 | End: 2019-05-08 | Stop reason: HOSPADM

## 2019-05-06 RX ORDER — LIDOCAINE 40 MG/G
CREAM TOPICAL
Status: DISCONTINUED | OUTPATIENT
Start: 2019-05-06 | End: 2019-05-08 | Stop reason: HOSPADM

## 2019-05-06 RX ORDER — ACETAMINOPHEN 325 MG/1
650 TABLET ORAL EVERY 4 HOURS PRN
Status: DISCONTINUED | OUTPATIENT
Start: 2019-05-06 | End: 2019-05-08 | Stop reason: HOSPADM

## 2019-05-06 RX ORDER — OXYCODONE HYDROCHLORIDE 5 MG/1
5-10 TABLET ORAL EVERY 4 HOURS PRN
Status: DISCONTINUED | OUTPATIENT
Start: 2019-05-06 | End: 2019-05-06

## 2019-05-06 RX ORDER — NALOXONE HYDROCHLORIDE 0.4 MG/ML
.1-.4 INJECTION, SOLUTION INTRAMUSCULAR; INTRAVENOUS; SUBCUTANEOUS
Status: DISCONTINUED | OUTPATIENT
Start: 2019-05-06 | End: 2019-05-08 | Stop reason: HOSPADM

## 2019-05-06 RX ORDER — ACETAMINOPHEN 650 MG/1
650 SUPPOSITORY RECTAL EVERY 4 HOURS PRN
Status: DISCONTINUED | OUTPATIENT
Start: 2019-05-06 | End: 2019-05-08 | Stop reason: HOSPADM

## 2019-05-06 RX ORDER — METOCLOPRAMIDE HYDROCHLORIDE 5 MG/ML
10 INJECTION INTRAMUSCULAR; INTRAVENOUS ONCE
Status: COMPLETED | OUTPATIENT
Start: 2019-05-06 | End: 2019-05-06

## 2019-05-06 RX ORDER — LISINOPRIL 20 MG/1
40 TABLET ORAL DAILY
Status: DISCONTINUED | OUTPATIENT
Start: 2019-05-06 | End: 2019-05-08 | Stop reason: HOSPADM

## 2019-05-06 RX ORDER — ASPIRIN 81 MG/1
81 TABLET, CHEWABLE ORAL DAILY
Status: DISCONTINUED | OUTPATIENT
Start: 2019-05-06 | End: 2019-05-08 | Stop reason: HOSPADM

## 2019-05-06 RX ORDER — VENLAFAXINE HYDROCHLORIDE 150 MG/1
150 CAPSULE, EXTENDED RELEASE ORAL
Status: DISCONTINUED | OUTPATIENT
Start: 2019-05-07 | End: 2019-05-08 | Stop reason: HOSPADM

## 2019-05-06 RX ORDER — KETOROLAC TROMETHAMINE 30 MG/ML
30 INJECTION, SOLUTION INTRAMUSCULAR; INTRAVENOUS ONCE
Status: COMPLETED | OUTPATIENT
Start: 2019-05-06 | End: 2019-05-06

## 2019-05-06 RX ORDER — LORATADINE 10 MG/1
10 TABLET ORAL DAILY
Status: DISCONTINUED | OUTPATIENT
Start: 2019-05-06 | End: 2019-05-08 | Stop reason: HOSPADM

## 2019-05-06 RX ORDER — OXYCODONE HYDROCHLORIDE 5 MG/1
5-10 TABLET ORAL EVERY 4 HOURS PRN
Status: DISCONTINUED | OUTPATIENT
Start: 2019-05-06 | End: 2019-05-08 | Stop reason: HOSPADM

## 2019-05-06 RX ORDER — ONDANSETRON 2 MG/ML
4 INJECTION INTRAMUSCULAR; INTRAVENOUS EVERY 6 HOURS PRN
Status: DISCONTINUED | OUTPATIENT
Start: 2019-05-06 | End: 2019-05-08 | Stop reason: HOSPADM

## 2019-05-06 RX ORDER — METFORMIN HYDROCHLORIDE 750 MG/1
750 TABLET, EXTENDED RELEASE ORAL
Status: DISCONTINUED | OUTPATIENT
Start: 2019-05-06 | End: 2019-05-08 | Stop reason: HOSPADM

## 2019-05-06 RX ADMIN — ASPIRIN 81 MG CHEWABLE TABLET 81 MG: 81 TABLET CHEWABLE at 14:05

## 2019-05-06 RX ADMIN — VANCOMYCIN HYDROCHLORIDE 2500 MG: 10 INJECTION, POWDER, LYOPHILIZED, FOR SOLUTION INTRAVENOUS at 09:57

## 2019-05-06 RX ADMIN — METFORMIN HYDROCHLORIDE 750 MG: 750 TABLET, EXTENDED RELEASE ORAL at 17:26

## 2019-05-06 RX ADMIN — OXYCODONE HYDROCHLORIDE 5 MG: 5 TABLET ORAL at 14:05

## 2019-05-06 RX ADMIN — OXYCODONE HYDROCHLORIDE 10 MG: 5 TABLET ORAL at 22:22

## 2019-05-06 RX ADMIN — OXYCODONE HYDROCHLORIDE 5 MG: 5 TABLET ORAL at 18:06

## 2019-05-06 RX ADMIN — KETOROLAC TROMETHAMINE 30 MG: 30 INJECTION, SOLUTION INTRAMUSCULAR at 07:56

## 2019-05-06 RX ADMIN — Medication 0.2 MG: at 15:53

## 2019-05-06 RX ADMIN — LISINOPRIL 40 MG: 20 TABLET ORAL at 14:05

## 2019-05-06 RX ADMIN — METOCLOPRAMIDE 10 MG: 5 INJECTION, SOLUTION INTRAMUSCULAR; INTRAVENOUS at 07:56

## 2019-05-06 RX ADMIN — CEFAZOLIN SODIUM 2 G: 2 INJECTION, SOLUTION INTRAVENOUS at 08:06

## 2019-05-06 RX ADMIN — LORATADINE 10 MG: 10 TABLET ORAL at 14:05

## 2019-05-06 RX ADMIN — Medication 0.2 MG: at 20:03

## 2019-05-06 RX ADMIN — VANCOMYCIN HYDROCHLORIDE 2500 MG: 10 INJECTION, POWDER, LYOPHILIZED, FOR SOLUTION INTRAVENOUS at 21:02

## 2019-05-06 RX ADMIN — OXYCODONE HYDROCHLORIDE 5 MG: 5 TABLET ORAL at 17:26

## 2019-05-06 ASSESSMENT — MIFFLIN-ST. JEOR: SCORE: 2409.05

## 2019-05-06 ASSESSMENT — ENCOUNTER SYMPTOMS
FEVER: 0
FACIAL SWELLING: 1
CHILLS: 0

## 2019-05-06 ASSESSMENT — PAIN DESCRIPTION - DESCRIPTORS: DESCRIPTORS: DISCOMFORT;CONSTANT

## 2019-05-06 NOTE — ED NOTES
Bed: ED22  Expected date:   Expected time: 6:52 AM  Means of arrival:   Comments:  H446 - 38M with swollen nose/face - no trauma - triaged yellow

## 2019-05-06 NOTE — ED PROVIDER NOTES
[place] EMERGENCY DEPARTMENT ([facility])  5/06/19    History     Chief Complaint   Patient presents with     Facial Swelling     HPI  Girma Trejo is a 38 year old male with diabetes and chronic chest pain presents with 2-3 days of nose swelling and pain after he picked on the sore inside the nare. He denies any fevers or chills or any other systemic symptoms. He is however complaining of lots of pain.     This part of the medical record was transcribed by Jaimie Adams Medical Scribe, from a dictation done by Dina Girard MD.     Current Facility-Administered Medications   Medication     acetaminophen (TYLENOL) Suppository 650 mg     acetaminophen (TYLENOL) tablet 650 mg     aspirin (ASA) chewable tablet 81 mg     glucose gel 15-30 g    Or     dextrose 50 % injection 25-50 mL    Or     glucagon injection 1 mg     HYDROmorphone (DILAUDID) injection 0.2 mg     hydrOXYzine (ATARAX) tablet 25 mg     insulin aspart (NovoLOG) inj (RAPID ACTING)     insulin aspart (NovoLOG) inj (RAPID ACTING)     lidocaine (LMX4) cream     lidocaine 1 % 0.1-1 mL     lisinopril (PRINIVIL/ZESTRIL) tablet 40 mg     loratadine (CLARITIN) tablet 10 mg     metFORMIN (GLUCOPHAGE-XR) 24 hr tablet 750 mg     naloxone (NARCAN) injection 0.1-0.4 mg     ondansetron (ZOFRAN-ODT) ODT tab 4 mg    Or     ondansetron (ZOFRAN) injection 4 mg     oxyCODONE (ROXICODONE) tablet 5-10 mg     sodium chloride (PF) 0.9% PF flush 3 mL     sodium chloride (PF) 0.9% PF flush 3 mL     vancomycin (VANCOCIN) 2,500 mg in sodium chloride 0.9 % 500 mL intermittent infusion     [START ON 5/7/2019] venlafaxine (EFFEXOR-XR) 24 hr capsule 150 mg     Past Medical History:   Diagnosis Date     Alcoholism (H)     treatment multiple times, most recently 2016     Ankle fracture as child     Anxiety      Chicken pox      Chronic chest pain     wall pain vs anxiety, several ER visits and stress test negative     Concussion 2007     Diabetes (H)       Hypertension, goal below 140/90      Major depression in remission (H) age 10    no suicide attempts, [facility], [facility]     Marijuana smoker     in [place]     Morbid obesity (H)      Nasal fracture      OCD (obsessive compulsive disorder)      Polysubstance (excluding opioids) dependence, binge pattern (H)     Prior meth abuse--treatment in 2016     Psoriasis of scalp      Smoker     1/3 ppd     Wrist fracture as child    right       Past Surgical History:   Procedure Laterality Date     APPENDECTOMY       ENT SURGERY      nasal fx     HC TOOTH EXTRACTION W/FORCEP       PE TUBES      as a child       Family History   Problem Relation Age of Onset     Depression Mother      Heart Surgery Mother         aortic valve abnormality     Hypertension Father      Pre-Diabetes Father      Diabetes Maternal Aunt      Diabetes Paternal Grandmother      Breast Cancer Maternal Aunt      Alcohol/Drug Maternal Uncle         and 1st cousin     Cancer Paternal Grandfather      Asthma No family hx of      C.A.D. No family hx of      Cerebrovascular Disease No family hx of      Cancer - colorectal No family hx of      Prostate Cancer No family hx of      Allergies No family hx of      Alzheimer Disease No family hx of      Anesthesia Reaction No family hx of      Arthritis No family hx of      Blood Disease No family hx of      Circulatory No family hx of      Congenital Anomalies No family hx of      Connective Tissue Disorder No family hx of      Endocrine Disease No family hx of      Eye Disorder No family hx of      Gastrointestinal Disease No family hx of      Genitourinary Problems No family hx of      Gynecology No family hx of      Lipids No family hx of      Musculoskeletal Disorder No family hx of      Neurologic Disorder No family hx of      Obesity No family hx of      Osteoporosis No family hx of      Respiratory No family hx of      Thyroid Disease No family hx of        Social History     Tobacco Use     Smoking status: Former  "Smoker     Packs/day: 0.10     Years: 15.00     Pack years: 1.50     Smokeless tobacco: Never Used     Tobacco comment: socially, mostly quit in 2016   Substance Use Topics     Alcohol use: Yes     Comment: Occasional--about 1x per week. 2-6 drinks at a time     Allergies   Allergen Reactions     Hydrocodone Rash     No problems with oxycodone and hydromorphone.     Pregabalin      Foot swelling     No Clinical Screening - See Comments      Other reaction(s): Runny Nose     Vilazodone Other (See Comments)     Blurred vision, racing heart       I have reviewed the Medications, Allergies, Past Medical and Surgical History, and Social History in the Epic system.    Review of Systems   Constitutional: Negative for chills and fever.   HENT: Positive for facial swelling (nose).    Musculoskeletal:        Positive for facial pain   All other systems reviewed and are negative.      Physical Exam   BP: 138/81  Pulse: 100  Heart Rate: 83  Temp: 98.2  F (36.8  C)  Resp: 16  Height: 190.5 cm (6' 3\")  Weight: 142.9 kg (315 lb)(6ft 3in)  SpO2: 96 %      Physical Exam   Constitutional: He is oriented to person, place, and time. No distress.   HENT:   Head: Atraumatic.       Mouth/Throat: Oropharynx is clear and moist.   Eyes: Pupils are equal, round, and reactive to light. No scleral icterus.   Neck: Neck supple. No JVD present.   Cardiovascular: Normal rate, regular rhythm, normal heart sounds and intact distal pulses. Exam reveals no gallop and no friction rub.   No murmur heard.  Pulmonary/Chest: Effort normal and breath sounds normal. No stridor. No respiratory distress. He has no wheezes. He has no rales. He exhibits no tenderness.   Abdominal: Soft. Bowel sounds are normal. He exhibits no distension and no mass. There is no tenderness. There is no rebound and no guarding. No hernia.   Musculoskeletal: He exhibits no edema or tenderness.   Neurological: He is alert and oriented to person, place, and time. No cranial nerve " deficit.   Skin: Skin is warm. No rash noted. He is not diaphoretic.       ED Course        Procedures        Results for orders placed or performed during the hospital encounter of 05/06/19 (from the past 24 hour(s))   CBC with platelets differential     Status: Abnormal    Collection Time: 05/06/19  7:52 AM   Result Value Ref Range    WBC 7.9 4.0 - 11.0 10e9/L    RBC Count 3.94 (L) 4.4 - 5.9 10e12/L    Hemoglobin 12.0 (L) 13.3 - 17.7 g/dL    Hematocrit 36.1 (L) 40.0 - 53.0 %    MCV 92 78 - 100 fl    MCH 30.5 26.5 - 33.0 pg    MCHC 33.2 31.5 - 36.5 g/dL    RDW 12.3 10.0 - 15.0 %    Platelet Count 161 150 - 450 10e9/L    Diff Method Automated Method     % Neutrophils 77.4 %    % Lymphocytes 14.8 %    % Monocytes 6.6 %    % Eosinophils 0.6 %    % Basophils 0.3 %    % Immature Granulocytes 0.3 %    Nucleated RBCs 0 0 /100    Absolute Neutrophil 6.1 1.6 - 8.3 10e9/L    Absolute Lymphocytes 1.2 0.8 - 5.3 10e9/L    Absolute Monocytes 0.5 0.0 - 1.3 10e9/L    Absolute Eosinophils 0.1 0.0 - 0.7 10e9/L    Absolute Basophils 0.0 0.0 - 0.2 10e9/L    Abs Immature Granulocytes 0.0 0 - 0.4 10e9/L    Absolute Nucleated RBC 0.0    INR     Status: None    Collection Time: 05/06/19  7:52 AM   Result Value Ref Range    INR 1.13 0.86 - 1.14   Comprehensive metabolic panel     Status: Abnormal    Collection Time: 05/06/19  7:52 AM   Result Value Ref Range    Sodium 137 133 - 144 mmol/L    Potassium 3.4 3.4 - 5.3 mmol/L    Chloride 102 94 - 109 mmol/L    Carbon Dioxide 26 20 - 32 mmol/L    Anion Gap 10 3 - 14 mmol/L    Glucose 262 (H) 70 - 99 mg/dL    Urea Nitrogen 16 7 - 30 mg/dL    Creatinine 1.07 0.66 - 1.25 mg/dL    GFR Estimate 86 >60 mL/min/[1.73_m2]    GFR Estimate If Black >90 >60 mL/min/[1.73_m2]    Calcium 8.8 8.5 - 10.1 mg/dL    Bilirubin Total 0.9 0.2 - 1.3 mg/dL    Albumin 3.4 3.4 - 5.0 g/dL    Protein Total 6.8 6.8 - 8.8 g/dL    Alkaline Phosphatase 54 40 - 150 U/L    ALT 26 0 - 70 U/L    AST 12 0 - 45 U/L   Lactic acid  whole blood     Status: None    Collection Time: 05/06/19  7:52 AM   Result Value Ref Range    Lactic Acid 1.6 0.7 - 2.0 mmol/L   CRP inflammation     Status: Abnormal    Collection Time: 05/06/19  7:52 AM   Result Value Ref Range    CRP Inflammation 35.0 (H) 0.0 - 8.0 mg/L   Erythrocyte sedimentation rate auto     Status: Abnormal    Collection Time: 05/06/19  7:52 AM   Result Value Ref Range    Sed Rate 29 (H) 0 - 15 mm/h   Procalcitonin     Status: None    Collection Time: 05/06/19  7:52 AM   Result Value Ref Range    Procalcitonin <0.05 ng/ml   Hemoglobin A1c     Status: Abnormal    Collection Time: 05/06/19  7:52 AM   Result Value Ref Range    Hemoglobin A1C 6.9 (H) 0 - 5.6 %   Wound Culture Aerobic Bacterial     Status: None (Preliminary result)    Collection Time: 05/06/19  9:37 AM   Result Value Ref Range    Specimen Description Nares Left Wound     Special Requests Specimen collected in eSwab transport (white cap)     Culture Micro PENDING    Glucose by meter     Status: Abnormal    Collection Time: 05/06/19  2:14 PM   Result Value Ref Range    Glucose 116 (H) 70 - 99 mg/dL           Labs Ordered and Resulted from Time of ED Arrival Up to the Time of Departure from the ED   CBC WITH PLATELETS DIFFERENTIAL - Abnormal; Notable for the following components:       Result Value    RBC Count 3.94 (*)     Hemoglobin 12.0 (*)     Hematocrit 36.1 (*)     All other components within normal limits   COMPREHENSIVE METABOLIC PANEL - Abnormal; Notable for the following components:    Glucose 262 (*)     All other components within normal limits   CRP INFLAMMATION - Abnormal; Notable for the following components:    CRP Inflammation 35.0 (*)     All other components within normal limits   ERYTHROCYTE SEDIMENTATION RATE AUTO - Abnormal; Notable for the following components:    Sed Rate 29 (*)     All other components within normal limits   INR   LACTIC ACID WHOLE BLOOD   PROCALCITONIN   WOUND CULTURE AEROBIC BACTERIAL             Assessments & Plan (with Medical Decision Making)  Facial cellulitis from picking sore in nare in the pt with DM type 2, cx'ed and started ancef 2 gram iv, D/W ED OBS MARTÍN-admit. Then, they wish to also start vanco.       I have reviewed the nursing notes.    I have reviewed the findings, diagnosis, plan and need for follow up with the patient.       Medication List      ASK your doctor about these medications    amoxicillin-clavulanate 875-125 MG tablet  Commonly known as:  AUGMENTIN  1 tablet, Oral, EVERY 12 HOURS  Ask about: Should I take this medication?     sulfamethoxazole-trimethoprim 800-160 MG tablet  Commonly known as:  BACTRIM DS/SEPTRA DS  1 tablet, Oral, 2 TIMES DAILY  Ask about: Should I take this medication?            Final diagnoses:   Facial cellulitis   Type 2 diabetes mellitus without complication, without long-term current use of insulin (H)       5/6/2019   [facility], [place], EMERGENCY DEPARTMENT     Dina Girard MD  05/06/19 [id number]

## 2019-05-06 NOTE — ED NOTES
Patient requested grandmother be called. RN called grandmother to inform her of patient's arrival to ED.

## 2019-05-06 NOTE — ED TRIAGE NOTES
Patient presents via EMS for c/o facial swelling and pain. Patient reports facial swelling (nose and upper lip) and pain started two days ago, but became worse yesterday. Patient reports having a sore in his nose a few days ago, which he picked at. Denies SOB. VSS on RA.

## 2019-05-06 NOTE — H&P
[facility], [place]    History and Physical - Emergency Department Observation Unit       Date of Admission:  5/6/2019    Assessment & Plan   Girma Trejo is a 38 year old male with past medical history of homelessness, DMII, HTN, polysubstance abuse (meth abuse 2016), and anxiety admitted on 5/6/2019 to ED Observation unit for monitoring of nose cellulitis     1. Nose cellulitis:  2-3 days of nose swelling and pain after he picked on the sore inside the nare. He denies any fevers or chills or any other systemic symptoms. WBC 7.9. Afebrile. CRP 35.0 Sed Rate 29. Lactic acid 1.6. Vitals:BP:138/81 Pulse: 100 Temp: 98.2 Resp: 16 SP02:96 Labs: Na 137, K 3.4, Cr 1.07, BUN 16, GFR 86, LFTS normal, , WBC 7.9, Hgb 12.0, Plt 161 wound culture pending Medications: Received Toraldol 30 mg IV x 1, Ancef 2 g and Vancomycin 2.500 mg IV in the ED.  Imaging:  None Consults:  Plan: Admit to ED observation for continued antibiotic and pain management  -VS Q4  -Oxycodone 10mg q 4 hours as needed for pain   -Dilaudid for breakthrough pain x 1   -Tylenol PRN  -IVF at 125ml/hr overnight  - Vancomycin IV Q12, transition to Keflex and Bactrim once improving.   -CRP, ESR, CBC and BMP in AM     2.DMII: BG today: in    Continue PTA metformin   - MSSI  - BG checks TID, HS  - Hypoglycemic protocol.         Chronic Medical Problems:   ##HTN continue PTA lisinopril  ##Anxiety: prn Atarax  ##Depression: continue PTA Effexor  ##INDIO: uses CPAP, this has been ordered.     Diet: Regular Diet Adult  NPO for Medical/Clinical Reasons Except for: Meds    DVT Prophylaxis: Low Risk/Ambulatory with no VTE prophylaxis indicated  Meléndez Catheter: not present  Code Status: Full Code      The patient's care was discussed with the Attending Physician, Dr. Crawley, Bedside Nurse, Care Coordinator/ and Patient.    TONY Lane, NP  Emergency Department  [phone] Ex  [phone]  ______________________________________________________________________    Chief Complaint   Nose cellulitis    History is obtained from the patient    History of Present Illness   Girma Trejo is a 38 year old male with diabetes and chronic chest pain presents with 2-3 days of nose swelling and pain after he picked on the sore inside the nare. He has a history of  homelessness, DMII, HTN, polysubstance abuse (meth abuse 2016), and anxiety   He was previously admited from 4/4-4/10 for cellulitis of his left 4th finger.   He had bedside incision and drainage in the ED by ortho, with a 4 mm incision to the dorsal aspect of his left 4th digit. Ortho ordered an ultrasound, which didn't find a significant fluid collection, but he did have a second bedside incision and drainage on 4/5 due to closing of the previous incision and some purulent drainage.  He was given opioid pain control and vancomycin/piptazo on admission through 4/8, and he was converted to SMX-TMP + Amox-Clav for MRSA and good strep coverage. CRP trended down steadily throughout his hospitalization. His pain was controlled with acetaminophen and oxycodone 5-10 q4 PRN, and he was discharged on this pain med regimen.Patient was to follow up with PCP per chart review, does not appear patient follow up. Finger appears well healed.                  Review of Systems    The 10 point Review of Systems is negative other than noted in the HPI or here. Reports nose is painful.     Past Medical History    I have reviewed this patient's medical history and updated it with pertinent information if needed.   Past Medical History:   Diagnosis Date     Alcoholism (H)     treatment multiple times, most recently 2016     Ankle fracture as child     Anxiety      Chicken pox      Chronic chest pain     wall pain vs anxiety, several ER visits and stress test negative     Concussion 2007     Diabetes (H)      Hypertension, goal below 140/90      Major depression in  remission (H) age 10    no suicide attempts, [facility], [facility]     Marijuana smoker     in LP     Morbid obesity (H)      Nasal fracture      OCD (obsessive compulsive disorder)      Polysubstance (excluding opioids) dependence, binge pattern (H)     Prior meth abuse--treatment in 2016     Psoriasis of scalp      Smoker     1/3 ppd     Wrist fracture as child    right       Past Surgical History   I have reviewed this patient's surgical history and updated it with pertinent information if needed.  Past Surgical History:   Procedure Laterality Date     APPENDECTOMY       ENT SURGERY      nasal fx     HC TOOTH EXTRACTION W/FORCEP       PE TUBES      as a child       Social History   I have reviewed this patient's social history and updated it with pertinent information if needed.  Social History     Tobacco Use     Smoking status: Former Smoker     Packs/day: 0.10     Years: 15.00     Pack years: 1.50     Smokeless tobacco: Never Used     Tobacco comment: socially, mostly quit in 2016   Substance Use Topics     Alcohol use: Yes     Comment: Occasional--about 1x per week. 2-6 drinks at a time     Drug use: Yes     Types: Marijuana     Comment: occasional marijuana--once every 3 or 4 months, prior meth abuse       Family History   I have reviewed this patient's family history and updated it with pertinent information if needed.   Family History   Problem Relation Age of Onset     Depression Mother      Heart Surgery Mother         aortic valve abnormality     Hypertension Father      Pre-Diabetes Father      Diabetes Maternal Aunt      Diabetes Paternal Grandmother      Breast Cancer Maternal Aunt      Alcohol/Drug Maternal Uncle         and 1st cousin     Cancer Paternal Grandfather      Asthma No family hx of      C.A.D. No family hx of      Cerebrovascular Disease No family hx of      Cancer - colorectal No family hx of      Prostate Cancer No family hx of      Allergies No family hx of      Alzheimer Disease No family hx  of      Anesthesia Reaction No family hx of      Arthritis No family hx of      Blood Disease No family hx of      Circulatory No family hx of      Congenital Anomalies No family hx of      Connective Tissue Disorder No family hx of      Endocrine Disease No family hx of      Eye Disorder No family hx of      Gastrointestinal Disease No family hx of      Genitourinary Problems No family hx of      Gynecology No family hx of      Lipids No family hx of      Musculoskeletal Disorder No family hx of      Neurologic Disorder No family hx of      Obesity No family hx of      Osteoporosis No family hx of      Respiratory No family hx of      Thyroid Disease No family hx of        Prior to Admission Medications   Prior to Admission Medications   Prescriptions Last Dose Informant Patient Reported? Taking?   ASPIRIN PO   Yes No   Sig: Take 81 mg by mouth daily    hydrOXYzine (ATARAX) 50 MG tablet   Yes No   Sig: Take 25 mg by mouth   lisinopril (PRINIVIL/ZESTRIL) 40 MG tablet   Yes Yes   Sig: Take 1 tablet (40 mg) by mouth daily   loratadine (CLARITIN) 10 MG tablet   Yes No   Sig: Take 10 mg by mouth   metFORMIN (GLUCOPHAGE-XR) 750 MG 24 hr tablet   Yes No   Sig: Take 750 mg by mouth   oxyCODONE (ROXICODONE) 5 MG tablet   No No   Sig: Take 1-2 tablets (5-10 mg) by mouth every 4 hours as needed for moderate to severe pain   venlafaxine (EFFEXOR-XR) 150 MG 24 hr capsule   Yes No   Sig: Take 150 mg by mouth      Facility-Administered Medications: None     Allergies   Allergies   Allergen Reactions     Hydrocodone Rash     No problems with oxycodone and hydromorphone.     Pregabalin      Foot swelling     No Clinical Screening - See Comments      Other reaction(s): Runny Nose     Vilazodone Other (See Comments)     Blurred vision, racing heart       Physical Exam   Vital Signs: Temp: 98.2  F (36.8  C) Temp src: Oral BP: 122/76 Pulse: 80   Resp: 16 SpO2: 97 % O2 Device: None (Room air)    Weight: 315 lbs 0 oz    Mouth/Throat:  Oropharynx is clear and moist.   Eyes: Pupils are equal, round, and reactive to light. No scleral icterus.   Neck: Neck supple. No JVD present.   Cardiovascular: Normal rate, regular rhythm, normal heart sounds and intact distal pulses. Exam reveals no gallop and no friction rub.   No murmur heard.  Pulmonary/Chest: Effort normal and breath sounds normal. No stridor. No respiratory distress. He has no wheezes. He has no rales. He exhibits no tenderness.   Abdominal: Soft. Bowel sounds are normal. He exhibits no distension and no mass. There is no tenderness. There is no rebound and no guarding. No hernia.   Musculoskeletal: He exhibits no edema or tenderness.   Neurological: He is alert and oriented to person, place, and time. No cranial nerve deficit.   Skin: Skin is warm. Erythamatous nose noted. Mildly warm to touch. .         Data   Data reviewed today: I reviewed all medications, new labs and imaging results over the last 24 hours.     Recent Labs   Lab 05/06/19 0752   WBC 7.9   HGB 12.0*   MCV 92      INR 1.13      POTASSIUM 3.4   CHLORIDE 102   CO2 26   BUN 16   CR 1.07   ANIONGAP 10   KEISHA 8.8   *   ALBUMIN 3.4   PROTTOTAL 6.8   BILITOTAL 0.9   ALKPHOS 54   ALT 26   AST 12     Most Recent 3 CBC's:  Recent Labs   Lab Test 05/06/19  0752 04/10/19  0621 04/09/19  0652   WBC 7.9 5.1 3.7*   HGB 12.0* 13.3 12.2*   MCV 92 97 97    167 156     Most Recent 3 BMP's:  Recent Labs   Lab Test 05/06/19  0752 04/07/19  0739 04/06/19  0612    142 139   POTASSIUM 3.4 4.1 4.3   CHLORIDE 102 111* 112*   CO2 26 26 19*   BUN 16 10 12   CR 1.07 1.01 1.07   ANIONGAP 10 5 8   KEISHA 8.8 8.8 8.0*   * 113* 128*     Most Recent 2 LFT's:  Recent Labs   Lab Test 05/06/19  0752 04/06/19  0612   AST 12 26   ALT 26 46   ALKPHOS 54 54   BILITOTAL 0.9 0.5     No results found for this or any previous visit (from the past 24 hour(s)).

## 2019-05-06 NOTE — PHARMACY-VANCOMYCIN DOSING SERVICE
Pharmacy Vancomycin Initial Note  Date of Service May 6, 2019  Patient's  1980  38 year old, male    Indication: Skin and Soft Tissue Infection    Current estimated CrCl = Estimated Creatinine Clearance: 132.4 mL/min (based on SCr of 1.07 mg/dL).    Creatinine for last 3 days  2019:  7:52 AM Creatinine 1.07 mg/dL    Recent Vancomycin Level(s) for last 3 days  No results found for requested labs within last 72 hours.      Vancomycin IV Administrations (past 72 hours)      No vancomycin orders with administrations in past 72 hours.                Nephrotoxins and other renal medications (From now, onward)    Start     Dose/Rate Route Frequency Ordered Stop    19 0943  vancomycin (VANCOCIN) 2,500 mg in sodium chloride 0.9 % 500 mL intermittent infusion      2,500 mg  over 2 Hours Intravenous EVERY 12 HOURS 19 0942            Contrast Orders - past 72 hours (72h ago, onward)    None                Plan:  1.  Start vancomycin  2500 mg IV q12h.   2.  Goal Trough Level: 10-15 mg/L   3.  Pharmacy will check trough levels as appropriate in 1-3 Days.    4. Serum creatinine levels will be ordered daily for the first week of therapy and at least twice weekly for subsequent weeks.    5. [place] method utilized to dose vancomycin therapy: Method 1    Donato Gutierrez

## 2019-05-07 ENCOUNTER — APPOINTMENT (OUTPATIENT)
Dept: CT IMAGING | Facility: CLINIC | Age: 39
End: 2019-05-07
Attending: INTERNAL MEDICINE
Payer: COMMERCIAL

## 2019-05-07 LAB
ANION GAP SERPL CALCULATED.3IONS-SCNC: 5 MMOL/L (ref 3–14)
BUN SERPL-MCNC: 10 MG/DL (ref 7–30)
CALCIUM SERPL-MCNC: 9.1 MG/DL (ref 8.5–10.1)
CHLORIDE SERPL-SCNC: 103 MMOL/L (ref 94–109)
CO2 SERPL-SCNC: 28 MMOL/L (ref 20–32)
CREAT SERPL-MCNC: 1 MG/DL (ref 0.66–1.25)
CRP SERPL-MCNC: 40 MG/L (ref 0–8)
ERYTHROCYTE [DISTWIDTH] IN BLOOD BY AUTOMATED COUNT: 12.4 % (ref 10–15)
ERYTHROCYTE [SEDIMENTATION RATE] IN BLOOD BY WESTERGREN METHOD: 29 MM/H (ref 0–15)
GFR SERPL CREATININE-BSD FRML MDRD: >90 ML/MIN/{1.73_M2}
GLUCOSE BLDC GLUCOMTR-MCNC: 137 MG/DL (ref 70–99)
GLUCOSE BLDC GLUCOMTR-MCNC: 154 MG/DL (ref 70–99)
GLUCOSE BLDC GLUCOMTR-MCNC: 164 MG/DL (ref 70–99)
GLUCOSE BLDC GLUCOMTR-MCNC: 166 MG/DL (ref 70–99)
GLUCOSE SERPL-MCNC: 148 MG/DL (ref 70–99)
HCT VFR BLD AUTO: 37.8 % (ref 40–53)
HGB BLD-MCNC: 12.6 G/DL (ref 13.3–17.7)
MCH RBC QN AUTO: 30.7 PG (ref 26.5–33)
MCHC RBC AUTO-ENTMCNC: 33.3 G/DL (ref 31.5–36.5)
MCV RBC AUTO: 92 FL (ref 78–100)
PLATELET # BLD AUTO: 160 10E9/L (ref 150–450)
POTASSIUM SERPL-SCNC: 3.7 MMOL/L (ref 3.4–5.3)
RBC # BLD AUTO: 4.1 10E12/L (ref 4.4–5.9)
SODIUM SERPL-SCNC: 136 MMOL/L (ref 133–144)
WBC # BLD AUTO: 7.4 10E9/L (ref 4–11)

## 2019-05-07 PROCEDURE — 25000132 ZZH RX MED GY IP 250 OP 250 PS 637: Performed by: NURSE PRACTITIONER

## 2019-05-07 PROCEDURE — 25000128 H RX IP 250 OP 636: Performed by: STUDENT IN AN ORGANIZED HEALTH CARE EDUCATION/TRAINING PROGRAM

## 2019-05-07 PROCEDURE — 70487 CT MAXILLOFACIAL W/DYE: CPT

## 2019-05-07 PROCEDURE — 86140 C-REACTIVE PROTEIN: CPT | Performed by: NURSE PRACTITIONER

## 2019-05-07 PROCEDURE — 00000146 ZZHCL STATISTIC GLUCOSE BY METER IP

## 2019-05-07 PROCEDURE — 80048 BASIC METABOLIC PNL TOTAL CA: CPT | Performed by: NURSE PRACTITIONER

## 2019-05-07 PROCEDURE — 40000556 ZZH STATISTIC PERIPHERAL IV START W US GUIDANCE

## 2019-05-07 PROCEDURE — 99225 ZZC SUBSEQUENT OBSERVATION CARE,LEVEL II: CPT | Mod: Z6 | Performed by: INTERNAL MEDICINE

## 2019-05-07 PROCEDURE — 25800030 ZZH RX IP 258 OP 636: Performed by: INTERNAL MEDICINE

## 2019-05-07 PROCEDURE — 25000128 H RX IP 250 OP 636: Performed by: PHYSICIAN ASSISTANT

## 2019-05-07 PROCEDURE — 25000125 ZZHC RX 250: Performed by: STUDENT IN AN ORGANIZED HEALTH CARE EDUCATION/TRAINING PROGRAM

## 2019-05-07 PROCEDURE — G0378 HOSPITAL OBSERVATION PER HR: HCPCS

## 2019-05-07 PROCEDURE — 85027 COMPLETE CBC AUTOMATED: CPT | Performed by: NURSE PRACTITIONER

## 2019-05-07 PROCEDURE — 96372 THER/PROPH/DIAG INJ SC/IM: CPT

## 2019-05-07 PROCEDURE — 36415 COLL VENOUS BLD VENIPUNCTURE: CPT | Performed by: NURSE PRACTITIONER

## 2019-05-07 PROCEDURE — 25000128 H RX IP 250 OP 636: Performed by: INTERNAL MEDICINE

## 2019-05-07 PROCEDURE — 96376 TX/PRO/DX INJ SAME DRUG ADON: CPT | Mod: 59

## 2019-05-07 PROCEDURE — 85652 RBC SED RATE AUTOMATED: CPT | Performed by: NURSE PRACTITIONER

## 2019-05-07 RX ORDER — KETOROLAC TROMETHAMINE 30 MG/ML
30 INJECTION, SOLUTION INTRAMUSCULAR; INTRAVENOUS EVERY 6 HOURS PRN
Status: DISCONTINUED | OUTPATIENT
Start: 2019-05-07 | End: 2019-05-08

## 2019-05-07 RX ORDER — AMOXICILLIN 250 MG
1 CAPSULE ORAL 2 TIMES DAILY
Status: DISCONTINUED | OUTPATIENT
Start: 2019-05-07 | End: 2019-05-08 | Stop reason: HOSPADM

## 2019-05-07 RX ORDER — IOPAMIDOL 755 MG/ML
75 INJECTION, SOLUTION INTRAVASCULAR ONCE
Status: COMPLETED | OUTPATIENT
Start: 2019-05-07 | End: 2019-05-07

## 2019-05-07 RX ORDER — POLYETHYLENE GLYCOL 3350 17 G/17G
17 POWDER, FOR SOLUTION ORAL DAILY
Status: DISCONTINUED | OUTPATIENT
Start: 2019-05-07 | End: 2019-05-08 | Stop reason: HOSPADM

## 2019-05-07 RX ADMIN — SODIUM CHLORIDE, PRESERVATIVE FREE 70 ML: 5 INJECTION INTRAVENOUS at 13:36

## 2019-05-07 RX ADMIN — VANCOMYCIN HYDROCHLORIDE 2500 MG: 10 INJECTION, POWDER, LYOPHILIZED, FOR SOLUTION INTRAVENOUS at 22:01

## 2019-05-07 RX ADMIN — OXYCODONE HYDROCHLORIDE 10 MG: 5 TABLET ORAL at 02:09

## 2019-05-07 RX ADMIN — KETOROLAC TROMETHAMINE 30 MG: 30 INJECTION, SOLUTION INTRAMUSCULAR at 09:23

## 2019-05-07 RX ADMIN — VENLAFAXINE HYDROCHLORIDE 150 MG: 150 CAPSULE, EXTENDED RELEASE ORAL at 09:24

## 2019-05-07 RX ADMIN — IOPAMIDOL 75 ML: 755 INJECTION, SOLUTION INTRAVENOUS at 13:35

## 2019-05-07 RX ADMIN — ASPIRIN 81 MG CHEWABLE TABLET 81 MG: 81 TABLET CHEWABLE at 09:23

## 2019-05-07 RX ADMIN — OXYCODONE HYDROCHLORIDE 10 MG: 5 TABLET ORAL at 06:33

## 2019-05-07 RX ADMIN — POLYETHYLENE GLYCOL 3350 17 G: 17 POWDER, FOR SOLUTION ORAL at 22:06

## 2019-05-07 RX ADMIN — LISINOPRIL 40 MG: 20 TABLET ORAL at 09:23

## 2019-05-07 RX ADMIN — LORATADINE 10 MG: 10 TABLET ORAL at 09:23

## 2019-05-07 RX ADMIN — OXYCODONE HYDROCHLORIDE 10 MG: 5 TABLET ORAL at 18:21

## 2019-05-07 RX ADMIN — KETOROLAC TROMETHAMINE 30 MG: 30 INJECTION, SOLUTION INTRAMUSCULAR at 19:42

## 2019-05-07 RX ADMIN — HYDROXYZINE HYDROCHLORIDE 25 MG: 25 TABLET ORAL at 02:28

## 2019-05-07 RX ADMIN — SENNOSIDES AND DOCUSATE SODIUM 1 TABLET: 8.6; 5 TABLET ORAL at 19:42

## 2019-05-07 RX ADMIN — VANCOMYCIN HYDROCHLORIDE 2500 MG: 10 INJECTION, POWDER, LYOPHILIZED, FOR SOLUTION INTRAVENOUS at 09:24

## 2019-05-07 RX ADMIN — METFORMIN HYDROCHLORIDE 750 MG: 750 TABLET, EXTENDED RELEASE ORAL at 19:22

## 2019-05-07 ASSESSMENT — PAIN DESCRIPTION - DESCRIPTORS
DESCRIPTORS: ACHING
DESCRIPTORS: DISCOMFORT

## 2019-05-07 NOTE — PLAN OF CARE
Observation goals:    - Tolerating oral antibiotics or has plans for home infusion set up. PENDING - on vancomycin with transition plan to PO antibiotics  - Vital signs normal or at patient baseline MET   - Adequate pain control on oral analgesia MET - pain controlled with only PO oxycodone overnight   - Infection is improving NOT MET - Pt report redness and itching on nose and lips   - Color, warmth, movement, sensation of affected area or limb is intact or at baseline NOT MET  - Return to baseline functional status NOT MET   - Safe disposition plan has been identified NOT MET

## 2019-05-07 NOTE — PROGRESS NOTES
Care Coordinator - Discharge Planning    Admission Date/Time:  5/6/2019  Attending MD:  Raji Crawley MD     Data  Date of initial CC assessment:    Chart reviewed, discussed with interdisciplinary team.   Patient was admitted for:   1. Facial cellulitis    2. Type 2 diabetes mellitus without complication, without long-term current use of insulin (H)         Assessment   Concerns with insurance coverage for discharge needs: None.  Current Living Situation: Patient is homeless and Patient lives at [facility].  Support System: Supportive  Services Involved: None  Transportation at Discharge: [facility],  [facility] [phone], Car and Family or friend will provide  Transportation to Medical Appointments:    - Name of caregiver: Self  Barriers to Discharge: None  .  Pt status reviewed/discussed with Doris, ED/OBS.  Pt admitted with pain, swelling of nose and upper lip.  Pt with primary medical history of type II DM, substance abuse history.   Concern noted with need for PCP.      Met with pt.  Introduced RNCC role.  Per discussion with pt he has a PCP Dr. Omayra Gunn, [facility].  Per pt he is able to arrange his own transportation for clinic appointments and will schedule  His own f/u appointment.        Plan  Anticipated Discharge Date:  TBD  Anticipated Discharge Plan:  TBD    Susan Nunez RN BSN, PHN RN Care Coordinator  Medicine Teams: Gold 1; Gold 2; ED/Observation   [phone]  Pager: [phone]  [facility] ER RN Care Coordinator job code * * * [id number]  5/7/2019 3:16 PM

## 2019-05-07 NOTE — PROGRESS NOTES
"ED OBSERVATION PROGRESS NOTE:  S: Girma Trejo is a 38 year old male with past medical history of homelessness, DMII, HTN, polysubstance abuse (meth abuse 2016), and anxiety admitted on 5/6/2019 to ED Observation unit for monitoring of nose cellulitis    Chief Complaint   Patient presents with     Facial Swelling     1. Facial cellulitis    2. Type 2 diabetes mellitus without complication, without long-term current use of insulin (H)        Problem List:  Patient Active Problem List   Diagnosis     Smoker     Psoriasis of scalp     Chronic chest pain     Alcoholism (H)     Marijuana smoker     OCD (obsessive compulsive disorder)     Anxiety     Hypertension, goal below 140/90     Major depression in remission (H)     Morbid obesity (H)     Depression     Falls     Chest pain     Finger injury     Cellulitis     Skin infection       MEDS:   No current outpatient medications on file.       ALLERGIES:    Allergies   Allergen Reactions     Hydrocodone Rash     No problems with oxycodone and hydromorphone.     Pregabalin      Foot swelling     No Clinical Screening - See Comments      Other reaction(s): Runny Nose     Vilazodone Other (See Comments)     Blurred vision, racing heart       O:/79   Pulse 65   Temp 98.1  F (36.7  C) (Oral)   Resp 18   Ht 1.905 m (6' 3\")   Wt 140.8 kg (310 lb 8 oz)   SpO2 96%   BMI 38.81 kg/m      Physical Exam   Constitutional: Pt is oriented to person, place, and time.Pt appears well-developed and well-nourished.   HENT: Mild swelling and erythema at the tip of the nose, left upper lip, left maxillary area.    Head: Normocephalic and atraumatic.   Eyes: Conjunctivae are normal. Pupils are equal, round, and reactive to light.   Neck: Normal range of motion. Neck supple.   Cardiovascular: Normal rate, regular rhythm, normal heart sounds and intact distal pulses.    Pulmonary/Chest: Effort normal and breath sounds normal. No respiratory distress. Pt has no wheezes. Pt has no " rales  Abdominal: Soft. Bowel sounds are normal. Pt exhibits no distension and no mass. No tenderness. Pt has no rebound and no guarding.   Musculoskeletal: Normal range of motion. Pt exhibits no edema.   Neurological: Pt is alert and oriented to person, place, and time. Normal reflexes.   Skin: Skin is warm and dry. No rash noted.   Psychiatric: Pt has a normal mood and affect. Behavior is normal. Judgment and thought content normal.     Assessment & Plan  Girma Trejo is a 38 year old male with past medical history of homelessness, DMII, HTN, polysubstance abuse (meth abuse 2016), and anxiety admitted on 5/6/2019 to ED Observation unit for monitoring of nose cellulitis     1. Nose cellulitis:  2-3 days of nose swelling and pain after he picked on the sore inside the nare. He denies any fevers or chills or any other systemic symptoms. WBC 7.9. Afebrile. CRP 35.0 Sed Rate 29. Lactic acid 1.6. Vitals:BP:138/81 Pulse: 100 Temp: 98.2 Resp: 16 SP02:96 Labs: Na 137, K 3.4, Cr 1.07, BUN 16, GFR 86, LFTS normal, , WBC 7.9, Hgb 12.0, Plt 161 wound culture pending Medications: Received Toraldol 30 mg IV x 1, Ancef 2 g and Vancomycin 2.500 mg IV in the ED.  Imaging:  None Consults:  Plan: Admit to ED observation for continued antibiotic and pain management. CT scan maxillofacial obtained today. No abscess noted. Patient's facial swelling and erythema improving today. Likely will discharge tomorrow.   -VS Q4  -Oxycodone 10mg q 4 hours as needed for pain   -Dilaudid for breakthrough pain x 1   -Tylenol PRN  -IVF at 125ml/hr overnight  - Vancomycin IV Q12, transition to Keflex and Bactrim once improving.   -CRP, ESR, CBC and BMP in AM     2.DMII: BG today: in    Continue PTA metformin   - MSSI  - BG checks TID, HS  - Hypoglycemic protocol.      Chronic Medical Problems:   ##HTN continue PTA lisinopril  ##Anxiety: prn Atarax  ##Depression: continue PTA Effexor  ##INDIO: uses CPAP, this has been  ordered.     Diet: Regular Diet Adult  NPO for Medical/Clinical Reasons Except for: Meds    DVT Prophylaxis: Low Risk/Ambulatory with no VTE prophylaxis indicated  Meléndez Catheter: not present  Code Status: Full Code        Signed:  Doris Thompson PA-C  May 7, 2019 at 4:07 PM

## 2019-05-07 NOTE — PLAN OF CARE
Observation Goals:    -Tolerating oral antibiotics or has plans for home infusion set up: Pt is on IV vancomycin.  - Vital signs normal or at patient baseline: YES  - Adequate pain control on oral analgesia: NO, pt is taking both IV dilaudid and oral oxycodone right on clock.   - Infection is improving: NO, Nose/upper lip still red and swollen, pt c/o constant pain.  - Color, warmth, movement, sensation of affected area or limb is intact or at baseline,: NO  - Return to baseline functional status: NO  - Safe disposition plan has been identified: NO  Continue with cares and update MD with any changes.

## 2019-05-07 NOTE — PROGRESS NOTES
Patient interviewed and examined. Labs, imaging and chart notes reviewed.  Girma Trejo presented to the ED yesterday with pain and swelling of his nose and uppr lip. He has some spread of swelling into the infra-orbital are on the left. He has a history of type 2 DM. He had a previous soft tissue infection. He has pain in his nose and midface. He has no fever, chills or sweats. He has mild headache. He has no cough, sputum, URI symptoms, nausea, vomiting, or abdominal pain.    Past Medical History:   Diagnosis Date     Alcoholism (H)     treatment multiple times, most recently 2016     Ankle fracture as child     Anxiety      Chicken pox      Chronic chest pain     wall pain vs anxiety, several ER visits and stress test negative     Concussion 2007     Diabetes (H)      Hypertension, goal below 140/90      Major depression in remission (H) age 10    no suicide attempts, [facility], [facility]     Marijuana smoker     in [place]     Morbid obesity (H)      Nasal fracture      OCD (obsessive compulsive disorder)      Polysubstance (excluding opioids) dependence, binge pattern (H)     Prior meth abuse--treatment in 2016     Psoriasis of scalp      Smoker     1/3 ppd     Wrist fracture as child    right       Past Surgical History:   Procedure Laterality Date     APPENDECTOMY       ENT SURGERY      nasal fx     HC TOOTH EXTRACTION W/FORCEP       PE TUBES      as a child       Family History   Problem Relation Age of Onset     Depression Mother      Heart Surgery Mother         aortic valve abnormality     Hypertension Father      Pre-Diabetes Father      Diabetes Maternal Aunt      Diabetes Paternal Grandmother      Breast Cancer Maternal Aunt      Alcohol/Drug Maternal Uncle         and 1st cousin     Cancer Paternal Grandfather      Asthma No family hx of      C.A.D. No family hx of      Cerebrovascular Disease No family hx of      Cancer - colorectal No family hx of      Prostate Cancer No family hx of      Allergies No family hx  "of      Alzheimer Disease No family hx of      Anesthesia Reaction No family hx of      Arthritis No family hx of      Blood Disease No family hx of      Circulatory No family hx of      Congenital Anomalies No family hx of      Connective Tissue Disorder No family hx of      Endocrine Disease No family hx of      Eye Disorder No family hx of      Gastrointestinal Disease No family hx of      Genitourinary Problems No family hx of      Gynecology No family hx of      Lipids No family hx of      Musculoskeletal Disorder No family hx of      Neurologic Disorder No family hx of      Obesity No family hx of      Osteoporosis No family hx of      Respiratory No family hx of      Thyroid Disease No family hx of        Social History     Tobacco Use     Smoking status: Former Smoker     Packs/day: 0.10     Years: 15.00     Pack years: 1.50     Smokeless tobacco: Never Used     Tobacco comment: socially, mostly quit in 2016   Substance Use Topics     Alcohol use: Yes     Comment: Occasional--about 1x per week. 2-6 drinks at a time     Physical Exam:  Middle aged male in NAD.  /76 (BP Location: Left arm)   Pulse 65   Temp 98.9  F (37.2  C) (Oral)   Resp 18   Ht 1.905 m (6' 3\")   Wt 140.8 kg (310 lb 8 oz)   SpO2 97%   BMI 38.81 kg/m    HEENT: Redness, swelling and tenderness at the tip of the nose R>L. Mild swelling of left maxillary area. PEERLA. EOMI. Oropharynx normal.  Neck: No adenopathy.  Lungs: Clear to auscultation.  Cardiac: RRR. Normal S1 and S2. No murmurs.  Abdomen: Soft, non tender.  Extrem: No edema.  Neuro: CN intact. Speech, motor, coordination intact.  Psych: Normal mood and affect.    Labs/Imaging    Results for orders placed or performed during the hospital encounter of 05/06/19 (from the past 24 hour(s))   Glucose by meter   Result Value Ref Range    Glucose 116 (H) 70 - 99 mg/dL   Glucose by meter   Result Value Ref Range    Glucose 208 (H) 70 - 99 mg/dL   Glucose by meter   Result Value Ref " Range    Glucose 152 (H) 70 - 99 mg/dL   Basic metabolic panel   Result Value Ref Range    Sodium 136 133 - 144 mmol/L    Potassium 3.7 3.4 - 5.3 mmol/L    Chloride 103 94 - 109 mmol/L    Carbon Dioxide 28 20 - 32 mmol/L    Anion Gap 5 3 - 14 mmol/L    Glucose 148 (H) 70 - 99 mg/dL    Urea Nitrogen 10 7 - 30 mg/dL    Creatinine 1.00 0.66 - 1.25 mg/dL    GFR Estimate >90 >60 mL/min/[1.73_m2]    GFR Estimate If Black >90 >60 mL/min/[1.73_m2]    Calcium 9.1 8.5 - 10.1 mg/dL   CBC with platelets   Result Value Ref Range    WBC 7.4 4.0 - 11.0 10e9/L    RBC Count 4.10 (L) 4.4 - 5.9 10e12/L    Hemoglobin 12.6 (L) 13.3 - 17.7 g/dL    Hematocrit 37.8 (L) 40.0 - 53.0 %    MCV 92 78 - 100 fl    MCH 30.7 26.5 - 33.0 pg    MCHC 33.3 31.5 - 36.5 g/dL    RDW 12.4 10.0 - 15.0 %    Platelet Count 160 150 - 450 10e9/L   CRP inflammation   Result Value Ref Range    CRP Inflammation 40.0 (H) 0.0 - 8.0 mg/L   Erythrocyte sedimentation rate auto   Result Value Ref Range    Sed Rate 29 (H) 0 - 15 mm/h   Glucose by meter   Result Value Ref Range    Glucose 154 (H) 70 - 99 mg/dL     Impression:  Middle aged male with type 2 DM presents with cellulitis vs abscess of nose. Nasal culture positive for MRSA. Sensitivities pending. At this point, I think we should continue IV vancomycin for at least 1 more day. The location of the infection puts him at risk for development of septic dural venous thrombosis and should be treated aggressively. He should have a maxillofacial CT with contrast today. If he has an abscess such as septal abscess associated with the infection, ENT should be consulted for prompt drainage.    Rolando Norris MD

## 2019-05-07 NOTE — PLAN OF CARE
Observation goals:  List all goals to be met before discharge home:   - Tolerating oral antibiotics or has plans for home infusion set up.:Not met,still on IV antibiotics.   - Vital signs normal or at patient baseline, :Yes  - Adequate pain control on oral analgesia, :Patient taking Toradol IV for pain management,said it works better for his pain than oxycodone  - Infection is improving, :No ongoing  - Color, warmth, movement, sensation of affected area or limb is intact or at baseline, :No  - Return to baseline functional status, No  - Safe disposition plan has been identified, :No  - Nurse to notify provider when observation goals have been met and patient is ready for discharge

## 2019-05-07 NOTE — PLAN OF CARE
Observation goals:    - Tolerating oral antibiotics or has plans for home infusion set up. PENDING - on vancomycin with transition plan   - Vital signs normal or at patient baseline MET   - Adequate pain control on oral analgesia MET - pain controlled with only PO oxycodone overnight   - Infection is improving NOT MET - Pt report redness and itching on nose and lips   - Color, warmth, movement, sensation of affected area or limb is intact or at baseline NOT MET  - Return to baseline functional status NOT MET   - Safe disposition plan has been identified NOT MET

## 2019-05-07 NOTE — PLAN OF CARE
Observation Goals:     -Tolerating oral antibiotics or has plans for home infusion set up: Pt is on IV vancomycin.  - Vital signs normal or at patient baseline: YES  - Adequate pain control on oral analgesia: NO, pt is taking both IV dilaudid and oral oxycodone right on clock.   - Infection is improving: NO  - Color, warmth, movement, sensation of affected area or limb is intact or at baseline,: NO  - Return to baseline functional status: NO  - Safe disposition plan has been identified: NO

## 2019-05-08 VITALS
HEIGHT: 75 IN | HEART RATE: 66 BPM | OXYGEN SATURATION: 95 % | RESPIRATION RATE: 16 BRPM | DIASTOLIC BLOOD PRESSURE: 83 MMHG | BODY MASS INDEX: 38.61 KG/M2 | WEIGHT: 310.5 LBS | TEMPERATURE: 98.3 F | SYSTOLIC BLOOD PRESSURE: 145 MMHG

## 2019-05-08 LAB
ANION GAP SERPL CALCULATED.3IONS-SCNC: 5 MMOL/L (ref 3–14)
BACTERIA SPEC CULT: ABNORMAL
BUN SERPL-MCNC: 17 MG/DL (ref 7–30)
CALCIUM SERPL-MCNC: 9 MG/DL (ref 8.5–10.1)
CHLORIDE SERPL-SCNC: 104 MMOL/L (ref 94–109)
CO2 SERPL-SCNC: 29 MMOL/L (ref 20–32)
CREAT SERPL-MCNC: 0.96 MG/DL (ref 0.66–1.25)
CRP SERPL-MCNC: 46 MG/L (ref 0–8)
ERYTHROCYTE [DISTWIDTH] IN BLOOD BY AUTOMATED COUNT: 12.3 % (ref 10–15)
ERYTHROCYTE [SEDIMENTATION RATE] IN BLOOD BY WESTERGREN METHOD: 36 MM/H (ref 0–15)
GFR SERPL CREATININE-BSD FRML MDRD: >90 ML/MIN/{1.73_M2}
GLUCOSE BLDC GLUCOMTR-MCNC: 118 MG/DL (ref 70–99)
GLUCOSE BLDC GLUCOMTR-MCNC: 137 MG/DL (ref 70–99)
GLUCOSE BLDC GLUCOMTR-MCNC: 227 MG/DL (ref 70–99)
GLUCOSE SERPL-MCNC: 115 MG/DL (ref 70–99)
HCT VFR BLD AUTO: 34.1 % (ref 40–53)
HGB BLD-MCNC: 11.2 G/DL (ref 13.3–17.7)
Lab: ABNORMAL
Lab: ABNORMAL
MCH RBC QN AUTO: 30.3 PG (ref 26.5–33)
MCHC RBC AUTO-ENTMCNC: 32.8 G/DL (ref 31.5–36.5)
MCV RBC AUTO: 92 FL (ref 78–100)
PLATELET # BLD AUTO: 149 10E9/L (ref 150–450)
POTASSIUM SERPL-SCNC: 4.1 MMOL/L (ref 3.4–5.3)
RBC # BLD AUTO: 3.7 10E12/L (ref 4.4–5.9)
SODIUM SERPL-SCNC: 139 MMOL/L (ref 133–144)
SPECIMEN SOURCE: ABNORMAL
SPECIMEN SOURCE: ABNORMAL
WBC # BLD AUTO: 5.9 10E9/L (ref 4–11)

## 2019-05-08 PROCEDURE — 36415 COLL VENOUS BLD VENIPUNCTURE: CPT | Performed by: PHYSICIAN ASSISTANT

## 2019-05-08 PROCEDURE — 85652 RBC SED RATE AUTOMATED: CPT | Performed by: PHYSICIAN ASSISTANT

## 2019-05-08 PROCEDURE — 25000128 H RX IP 250 OP 636: Performed by: PHYSICIAN ASSISTANT

## 2019-05-08 PROCEDURE — 25000132 ZZH RX MED GY IP 250 OP 250 PS 637: Performed by: NURSE PRACTITIONER

## 2019-05-08 PROCEDURE — 85027 COMPLETE CBC AUTOMATED: CPT | Performed by: PHYSICIAN ASSISTANT

## 2019-05-08 PROCEDURE — 25000132 ZZH RX MED GY IP 250 OP 250 PS 637: Performed by: PHYSICIAN ASSISTANT

## 2019-05-08 PROCEDURE — G0378 HOSPITAL OBSERVATION PER HR: HCPCS

## 2019-05-08 PROCEDURE — 00000146 ZZHCL STATISTIC GLUCOSE BY METER IP

## 2019-05-08 PROCEDURE — 99217 ZZC OBSERVATION CARE DISCHARGE: CPT | Mod: Z6 | Performed by: PHYSICIAN ASSISTANT

## 2019-05-08 PROCEDURE — 80048 BASIC METABOLIC PNL TOTAL CA: CPT | Performed by: PHYSICIAN ASSISTANT

## 2019-05-08 PROCEDURE — 96372 THER/PROPH/DIAG INJ SC/IM: CPT

## 2019-05-08 PROCEDURE — 86140 C-REACTIVE PROTEIN: CPT | Performed by: PHYSICIAN ASSISTANT

## 2019-05-08 PROCEDURE — 96376 TX/PRO/DX INJ SAME DRUG ADON: CPT

## 2019-05-08 RX ORDER — OXYCODONE HYDROCHLORIDE 5 MG/1
5-10 TABLET ORAL EVERY 4 HOURS PRN
Qty: 24 TABLET | Refills: 0 | Status: SHIPPED | OUTPATIENT
Start: 2019-05-08 | End: 2020-12-21

## 2019-05-08 RX ORDER — SULFAMETHOXAZOLE/TRIMETHOPRIM 800-160 MG
1 TABLET ORAL 2 TIMES DAILY
Qty: 20 TABLET | Refills: 0 | Status: SHIPPED | OUTPATIENT
Start: 2019-05-08 | End: 2019-05-18

## 2019-05-08 RX ORDER — SULFAMETHOXAZOLE/TRIMETHOPRIM 800-160 MG
1 TABLET ORAL 2 TIMES DAILY
Status: DISCONTINUED | OUTPATIENT
Start: 2019-05-08 | End: 2019-05-08 | Stop reason: HOSPADM

## 2019-05-08 RX ORDER — MAGNESIUM CARB/ALUMINUM HYDROX 105-160MG
296 TABLET,CHEWABLE ORAL ONCE
Status: COMPLETED | OUTPATIENT
Start: 2019-05-08 | End: 2019-05-08

## 2019-05-08 RX ADMIN — OXYCODONE HYDROCHLORIDE 10 MG: 5 TABLET ORAL at 08:59

## 2019-05-08 RX ADMIN — OXYCODONE HYDROCHLORIDE 10 MG: 5 TABLET ORAL at 15:02

## 2019-05-08 RX ADMIN — LISINOPRIL 40 MG: 20 TABLET ORAL at 08:59

## 2019-05-08 RX ADMIN — MAGNESIUM CITRATE 296 ML: 1.75 LIQUID ORAL at 08:59

## 2019-05-08 RX ADMIN — KETOROLAC TROMETHAMINE 30 MG: 30 INJECTION, SOLUTION INTRAMUSCULAR at 02:22

## 2019-05-08 RX ADMIN — DOCUSATE SODIUM 286 ML: 50 LIQUID ORAL at 15:02

## 2019-05-08 RX ADMIN — HYDROXYZINE HYDROCHLORIDE 25 MG: 25 TABLET ORAL at 15:02

## 2019-05-08 RX ADMIN — ACETAMINOPHEN 650 MG: 325 TABLET, FILM COATED ORAL at 08:59

## 2019-05-08 RX ADMIN — LORATADINE 10 MG: 10 TABLET ORAL at 08:59

## 2019-05-08 RX ADMIN — ASPIRIN 81 MG CHEWABLE TABLET 81 MG: 81 TABLET CHEWABLE at 08:59

## 2019-05-08 RX ADMIN — VENLAFAXINE HYDROCHLORIDE 150 MG: 150 CAPSULE, EXTENDED RELEASE ORAL at 08:59

## 2019-05-08 RX ADMIN — SENNOSIDES AND DOCUSATE SODIUM 1 TABLET: 8.6; 5 TABLET ORAL at 08:59

## 2019-05-08 RX ADMIN — SULFAMETHOXAZOLE AND TRIMETHOPRIM 1 TABLET: 800; 160 TABLET ORAL at 12:30

## 2019-05-08 RX ADMIN — POLYETHYLENE GLYCOL 3350 17 G: 17 POWDER, FOR SOLUTION ORAL at 08:59

## 2019-05-08 NOTE — DISCHARGE SUMMARY
Discharge Summary    Girma Trejo MRN# [id number]   YOB: 1980 Age: 39 year old     Date of Admission:  5/6/2019  Date of Discharge:  5/8/2019  Admitting Physician:  Dina Girard MD  Discharge Physician:  KEVIN BUI  Discharging Service:  Emergency Department Observation Unit     Primary Provider: Omayra Gunn          Discharge Diagnosis:   Cellulitis, nose              Discharge Disposition:   Discharged to home           Condition on Discharge:   Discharge condition: Stable   Code status on discharge: Full Code           Procedures:   Imaging performed:   Maxillofacial              Discharge Medications:     Current Discharge Medication List      CONTINUE these medications which have CHANGED    Details   oxyCODONE (ROXICODONE) 5 MG tablet Take 1-2 tablets (5-10 mg) by mouth every 4 hours as needed for moderate to severe pain  Qty: 24 tablet, Refills: 0    Associated Diagnoses: Cellulitis, unspecified cellulitis site      sulfamethoxazole-trimethoprim (BACTRIM DS/SEPTRA DS) 800-160 MG tablet Take 1 tablet by mouth 2 times daily for 10 days  Qty: 20 tablet, Refills: 0    Associated Diagnoses: Cellulitis, unspecified cellulitis site; Skin infection         CONTINUE these medications which have NOT CHANGED    Details   lisinopril (PRINIVIL/ZESTRIL) 40 MG tablet Take 1 tablet (40 mg) by mouth daily      ASPIRIN PO Take 81 mg by mouth daily       hydrOXYzine (ATARAX) 50 MG tablet Take 25 mg by mouth      loratadine (CLARITIN) 10 MG tablet Take 10 mg by mouth      metFORMIN (GLUCOPHAGE-XR) 750 MG 24 hr tablet Take 750 mg by mouth      venlafaxine (EFFEXOR-XR) 150 MG 24 hr capsule Take 150 mg by mouth         STOP taking these medications       amoxicillin-clavulanate (AUGMENTIN) 875-125 MG tablet Comments:   Reason for Stopping:                     Consultations:   No consultations were requested during this admission             Brief History of Illness:   Girma Trejo is a 38 year old  "male with past medical history of homelessness, DMII, HTN, polysubstance abuse (meth abuse 2016), and anxiety admitted on 5/6/2019 to ED Observation unit for monitoring of nose cellulitis          Hospital Course:   1. Nose cellulitis:  2-3 days of nose swelling and pain after he picked on the sore inside the nare. He denies any fevers or chills or any other systemic symptoms. WBC 7.9. Afebrile. CRP 35.0 Sed Rate 29. Lactic acid 1.6. Vitals:BP:138/81 Pulse: 100 Temp: 98.2 Resp: 16 SP02:96 Labs: Na 137, K 3.4, Cr 1.07, BUN 16, GFR 86, LFTS normal, , WBC 7.9, Hgb 12.0, Plt 161 wound culture pending Medications: Received Toraldol 30 mg IV x 1, Ancef 2 g and Vancomycin 2.500 mg IV in the ED.  Imaging:  None Consults:  Plan: Admit to ED observation for continued antibiotic and pain management. CT scan maxillofacial obtained. No abscess noted. Patient's facial swelling and erythema improving today. Denies nausea, vomiting, fever, chills, chest pain or SOB at discharge. Discharged on Bactrim 160/800 mg BID x 10 days. Advised close follow up with primary in 1 week.      2.DMII: BG today: in    Continue PTA metformin   - BG checks TID, HS    3. Constipation: Patient magnesium citrate and pink lady enema without good outcome. Has had 2 BM before discharge.          Chronic Medical Problems:   ##HTN continue PTA lisinopril  ##Anxiety: prn Atarax  ##Depression: continue PTA Effexor  ##INDIO: uses CPAP, this has been ordered                Final Day of Progress before Discharge:       Physical Exam:  Blood pressure 145/83, pulse 66, temperature 98.3  F (36.8  C), temperature source Oral, resp. rate 16, height 1.905 m (6' 3\"), weight 140.8 kg (310 lb 8 oz), SpO2 95 %.    EXAM:  Physical Exam   Constitutional: Pt is oriented to person, place, and time.Pt appears well-developed and well-nourished.   HENT:   Head: Normocephalic and atraumatic.   Eyes: Conjunctivae are normal. Pupils are equal, round, and reactive to light. "   Neck: Normal range of motion. Neck supple.   Cardiovascular: Normal rate, regular rhythm, normal heart sounds and intact distal pulses.    Pulmonary/Chest: Effort normal and breath sounds normal. No respiratory distress. Pt has no wheezes. Pt has no rales  Abdominal: Soft. Bowel sounds are normal. Pt exhibits no distension and no mass. No tenderness. Pt has no rebound and no guarding.   Musculoskeletal: Normal range of motion. Pt exhibits no edema.   Neurological: Pt is alert and oriented to person, place, and time. Normal reflexes.   Skin: Skin is warm and dry. No rash noted.   Psychiatric: Pt has a normal mood and affect. Behavior is normal. Judgment and thought content normal.             Data:  All laboratory data reviewed             Significant Results:   None  Results for orders placed or performed during the hospital encounter of 05/06/19   CT Facial Bones with Contrast    Narrative    CT FACIAL BONES WITH CONTRAST 5/7/2019 1:59 PM    History:  facial cellulitis, most prominent at tip of nose, MRSA, eval  for abscess    Comparison:  Brain MRI 5/20/2014, head/neck CT angiogram 5/21/2014.    Technique: Using thin collimation multidetector helical acquisition  technique, axial and coronal thin section CT images were reconstructed  through the facial bones with intravenous contrast. Images were  reviewed in bone and soft tissue windows.    Contrast: iopamidol (ISOVUE-370) solution 75 mL    Findings:  Subcutaneous soft tissue stranding in the anterior nose and upper lip.  No focal loculated fluid collection. No underlying osseous erosions.  Enlarged 2.1 cm left level 1B node (series 5, image 6) is likely  reactive.    No facial bone fracture. Perform plates intact. Facial bones are  normally aligned. Minimal mucosal thickening in the maxillary sinuses.  Mastoid air cells are clear. Visualized intracranial contents are  unremarkable.      Impression    Impression:   Soft tissue stranding of the nose and upper  left compatible with  clinical history of cellulitis. No facial abscess.     I have personally reviewed the examination and initial interpretation  and I agree with the findings.    JUANCHO SANDY MD   CBC with platelets differential   Result Value Ref Range    WBC 7.9 4.0 - 11.0 10e9/L    RBC Count 3.94 (L) 4.4 - 5.9 10e12/L    Hemoglobin 12.0 (L) 13.3 - 17.7 g/dL    Hematocrit 36.1 (L) 40.0 - 53.0 %    MCV 92 78 - 100 fl    MCH 30.5 26.5 - 33.0 pg    MCHC 33.2 31.5 - 36.5 g/dL    RDW 12.3 10.0 - 15.0 %    Platelet Count 161 150 - 450 10e9/L    Diff Method Automated Method     % Neutrophils 77.4 %    % Lymphocytes 14.8 %    % Monocytes 6.6 %    % Eosinophils 0.6 %    % Basophils 0.3 %    % Immature Granulocytes 0.3 %    Nucleated RBCs 0 0 /100    Absolute Neutrophil 6.1 1.6 - 8.3 10e9/L    Absolute Lymphocytes 1.2 0.8 - 5.3 10e9/L    Absolute Monocytes 0.5 0.0 - 1.3 10e9/L    Absolute Eosinophils 0.1 0.0 - 0.7 10e9/L    Absolute Basophils 0.0 0.0 - 0.2 10e9/L    Abs Immature Granulocytes 0.0 0 - 0.4 10e9/L    Absolute Nucleated RBC 0.0    INR   Result Value Ref Range    INR 1.13 0.86 - 1.14   Comprehensive metabolic panel   Result Value Ref Range    Sodium 137 133 - 144 mmol/L    Potassium 3.4 3.4 - 5.3 mmol/L    Chloride 102 94 - 109 mmol/L    Carbon Dioxide 26 20 - 32 mmol/L    Anion Gap 10 3 - 14 mmol/L    Glucose 262 (H) 70 - 99 mg/dL    Urea Nitrogen 16 7 - 30 mg/dL    Creatinine 1.07 0.66 - 1.25 mg/dL    GFR Estimate 86 >60 mL/min/[1.73_m2]    GFR Estimate If Black >90 >60 mL/min/[1.73_m2]    Calcium 8.8 8.5 - 10.1 mg/dL    Bilirubin Total 0.9 0.2 - 1.3 mg/dL    Albumin 3.4 3.4 - 5.0 g/dL    Protein Total 6.8 6.8 - 8.8 g/dL    Alkaline Phosphatase 54 40 - 150 U/L    ALT 26 0 - 70 U/L    AST 12 0 - 45 U/L   Lactic acid whole blood   Result Value Ref Range    Lactic Acid 1.6 0.7 - 2.0 mmol/L   CRP inflammation   Result Value Ref Range    CRP Inflammation 35.0 (H) 0.0 - 8.0 mg/L   Erythrocyte sedimentation  rate auto   Result Value Ref Range    Sed Rate 29 (H) 0 - 15 mm/h   Procalcitonin   Result Value Ref Range    Procalcitonin <0.05 ng/ml   Hemoglobin A1c   Result Value Ref Range    Hemoglobin A1C 6.9 (H) 0 - 5.6 %   Glucose by meter   Result Value Ref Range    Glucose 116 (H) 70 - 99 mg/dL   Glucose by meter   Result Value Ref Range    Glucose 208 (H) 70 - 99 mg/dL   Glucose by meter   Result Value Ref Range    Glucose 152 (H) 70 - 99 mg/dL   Basic metabolic panel   Result Value Ref Range    Sodium 136 133 - 144 mmol/L    Potassium 3.7 3.4 - 5.3 mmol/L    Chloride 103 94 - 109 mmol/L    Carbon Dioxide 28 20 - 32 mmol/L    Anion Gap 5 3 - 14 mmol/L    Glucose 148 (H) 70 - 99 mg/dL    Urea Nitrogen 10 7 - 30 mg/dL    Creatinine 1.00 0.66 - 1.25 mg/dL    GFR Estimate >90 >60 mL/min/[1.73_m2]    GFR Estimate If Black >90 >60 mL/min/[1.73_m2]    Calcium 9.1 8.5 - 10.1 mg/dL   CBC with platelets   Result Value Ref Range    WBC 7.4 4.0 - 11.0 10e9/L    RBC Count 4.10 (L) 4.4 - 5.9 10e12/L    Hemoglobin 12.6 (L) 13.3 - 17.7 g/dL    Hematocrit 37.8 (L) 40.0 - 53.0 %    MCV 92 78 - 100 fl    MCH 30.7 26.5 - 33.0 pg    MCHC 33.3 31.5 - 36.5 g/dL    RDW 12.4 10.0 - 15.0 %    Platelet Count 160 150 - 450 10e9/L   CRP inflammation   Result Value Ref Range    CRP Inflammation 40.0 (H) 0.0 - 8.0 mg/L   Erythrocyte sedimentation rate auto   Result Value Ref Range    Sed Rate 29 (H) 0 - 15 mm/h   Glucose by meter   Result Value Ref Range    Glucose 154 (H) 70 - 99 mg/dL   Glucose by meter   Result Value Ref Range    Glucose 166 (H) 70 - 99 mg/dL   Glucose by meter   Result Value Ref Range    Glucose 164 (H) 70 - 99 mg/dL   Glucose by meter   Result Value Ref Range    Glucose 137 (H) 70 - 99 mg/dL   CBC with platelets   Result Value Ref Range    WBC 5.9 4.0 - 11.0 10e9/L    RBC Count 3.70 (L) 4.4 - 5.9 10e12/L    Hemoglobin 11.2 (L) 13.3 - 17.7 g/dL    Hematocrit 34.1 (L) 40.0 - 53.0 %    MCV 92 78 - 100 fl    MCH 30.3 26.5 - 33.0 pg     MCHC 32.8 31.5 - 36.5 g/dL    RDW 12.3 10.0 - 15.0 %    Platelet Count 149 (L) 150 - 450 10e9/L   Basic metabolic panel   Result Value Ref Range    Sodium 139 133 - 144 mmol/L    Potassium 4.1 3.4 - 5.3 mmol/L    Chloride 104 94 - 109 mmol/L    Carbon Dioxide 29 20 - 32 mmol/L    Anion Gap 5 3 - 14 mmol/L    Glucose 115 (H) 70 - 99 mg/dL    Urea Nitrogen 17 7 - 30 mg/dL    Creatinine 0.96 0.66 - 1.25 mg/dL    GFR Estimate >90 >60 mL/min/[1.73_m2]    GFR Estimate If Black >90 >60 mL/min/[1.73_m2]    Calcium 9.0 8.5 - 10.1 mg/dL   CRP inflammation   Result Value Ref Range    CRP Inflammation 46.0 (H) 0.0 - 8.0 mg/L   Erythrocyte sedimentation rate auto   Result Value Ref Range    Sed Rate 36 (H) 0 - 15 mm/h   Glucose by meter   Result Value Ref Range    Glucose 118 (H) 70 - 99 mg/dL   Glucose by meter   Result Value Ref Range    Glucose 137 (H) 70 - 99 mg/dL   Glucose by meter   Result Value Ref Range    Glucose 227 (H) 70 - 99 mg/dL   Wound Culture Aerobic Bacterial   Result Value Ref Range    Specimen Description Nares Left Wound     Special Requests Specimen collected in eSwab transport (white cap)     Culture Micro (A)      Moderate growth  Methicillin resistant Staphylococcus aureus (MRSA)  This isolate DOES NOT demonstrate inducible clindamycin resistance in vitro. Clindamycin   is susceptible and could be used when indicated, however, erythromycin is resistant and   should not be used.      Culture Micro (A)      Light growth  Coagulase negative Staphylococcus  Susceptibility testing not routinely done         Susceptibility    Methicillin resistant staphylococcus aureus (mrsa) - RACQUEL     CLINDAMYCIN <=0.25 Sensitive ug/mL     ERYTHROMYCIN >=8 Resistant ug/mL     GENTAMICIN <=0.5 Sensitive ug/mL     OXACILLIN >=4 Resistant ug/mL     TETRACYCLINE <=1 Sensitive ug/mL     Trimethoprim/Sulfa <=0.5/9.5 Sensitive ug/mL     VANCOMYCIN <=0.5 Sensitive ug/mL     LINEZOLID 2 Sensitive ug/mL   Methicillin Resistant  Staph Aureus PCR   Result Value Ref Range    Specimen Description Nares     Methicillin Resist/Sens S. aureus PCR Positive (A) NEG^Negative   Referral sensitivity   Result Value Ref Range    Specimen Description Nares     Special Requests Specimen collected in eSwab transport (white cap)     Culture Micro (A)      Methicillin resistant Staphylococcus aureus (MRSA) isolated  This isolate DOES NOT demonstrate inducible clindamycin resistance in vitro. Clindamycin   is susceptible and could be used when indicated, however, erythromycin is resistant and   should not be used.         Susceptibility    Methicillin resistant staphylococcus aureus (mrsa) isolated - RACQUEL     CLINDAMYCIN <=0.25 Sensitive ug/mL     ERYTHROMYCIN >=8 Resistant ug/mL     GENTAMICIN <=0.5 Sensitive ug/mL     OXACILLIN >=4 Resistant ug/mL     TETRACYCLINE <=1 Sensitive ug/mL     Trimethoprim/Sulfa <=0.5/9.5 Sensitive ug/mL     VANCOMYCIN 1 Sensitive ug/mL     LINEZOLID 2 Sensitive ug/mL      Recent Results (from the past 48 hour(s))   CT Facial Bones with Contrast    Narrative    CT FACIAL BONES WITH CONTRAST 5/7/2019 1:59 PM    History:  facial cellulitis, most prominent at tip of nose, MRSA, eval  for abscess    Comparison:  Brain MRI 5/20/2014, head/neck CT angiogram 5/21/2014.    Technique: Using thin collimation multidetector helical acquisition  technique, axial and coronal thin section CT images were reconstructed  through the facial bones with intravenous contrast. Images were  reviewed in bone and soft tissue windows.    Contrast: iopamidol (ISOVUE-370) solution 75 mL    Findings:  Subcutaneous soft tissue stranding in the anterior nose and upper lip.  No focal loculated fluid collection. No underlying osseous erosions.  Enlarged 2.1 cm left level 1B node (series 5, image 6) is likely  reactive.    No facial bone fracture. Perform plates intact. Facial bones are  normally aligned. Minimal mucosal thickening in the maxillary  sinuses.  Mastoid air cells are clear. Visualized intracranial contents are  unremarkable.      Impression    Impression:   Soft tissue stranding of the nose and upper left compatible with  clinical history of cellulitis. No facial abscess.     I have personally reviewed the examination and initial interpretation  and I agree with the findings.    JUANCHO SANDY MD                Pending Results:   Unresulted Labs Ordered in the Past 30 Days of this Admission     No orders found from 3/7/2019 to 5/7/2019.                  Discharge Instructions and Follow-Up:     Discharge Procedure Orders   Reason for your hospital stay   Order Comments: Cellulitis     Follow Up and recommended labs and tests   Order Comments: Follow up with primary in 1 week     Activity   Order Comments: Your activity upon discharge: activity as tolerated     Order Specific Question Answer Comments   Is discharge order? Yes      When to contact your care team   Order Comments: Difficulty or pain when moving the joints above or below the infected area  Discharge or pus draining from the area  Fever of 100.4 F (38 C) or higher, or as directed by your child's healthcare provider  Shaking chills  Pain or redness that gets worse in or around the infected area, especially if the area of redness gets larger  Swelling in the infected area  Vomiting     Discharge Instructions   Order Comments: You were admitted for facial cellulitis. CT scan of the face did not show any abscess process. You have been treated with IV antibiotics and pain medications. Your symptoms are currently improving. Take the prescribed antibiotic medicine you are given as directed until it is gone. Keep the infected area clean. Talk with your healthcare provider if you have any concerns. Take your temperature once a day for a week. Follow up with your primary in 1 week.     Full Code     Order Specific Question Answer Comments   Code status determined by: Discussion with  patient/legal decision maker      Diet   Order Comments: Follow this diet upon discharge: Regular     Order Specific Question Answer Comments   Is discharge order? Yes           Attestation:  Doris MANI Thompson PA-C

## 2019-05-08 NOTE — PROGRESS NOTES
SW BRIEF NOTE:     Pt is ready for discharge, has no ride home and is unable to pay for cab. SWer arranged for [employer] ride and scheduled taxi for 4:30pm today. [employer] company given pt cell phone number and RN and charge RN aware. No further SW needs.     MAGALIE Cardenas, [facility]  Acute Care Inpatient Clinical   6D-Observation Unit  Phone: [phone]  I   Pager: [phone]

## 2019-05-08 NOTE — PLAN OF CARE
Observation goals PRIOR TO DISCHARGE     Comments: List all goals to be met before discharge home:   - Tolerating oral antibiotics or has plans for home infusion set up.\, NO: still on IV abx  - Vital signs normal or at patient baseline, YES  - Adequate pain control on oral analgesia, NO: pt stating oxycodone doesn't touch the pain. IV Toradol has been helping immensely  - Infection is improving, PENDING  - Color, warmth, movement, sensation of affected area or limb is intact or at baseline, NO: skin is red and swollen  - Return to baseline functional status, PENDING: pt up independently in room  - Safe disposition plan has been identified, PENDING  - Nurse to notify provider when observation goals have been met and patient is ready for discharge.

## 2019-05-08 NOTE — PLAN OF CARE
Outpatient/Observation goals to be met before discharge home:     - Tolerating oral antibiotics or has plans for home infusion set up: yes  - Vital signs normal or at patient baseline: yes  - Adequate pain control on oral analgesia: yes  - Infection is improving: yes  - Color, warmth, movement, sensation of affected area or limb is intact or at baseline: yes  - Return to baseline functional status: yes  - Safe disposition plan has been identified: no

## 2019-05-08 NOTE — PLAN OF CARE
Outpatient/Observation goals to be met before discharge home:     - Tolerating oral antibiotics or has plans for home infusion set up: no  - Vital signs normal or at patient baseline: yes  - Adequate pain control on oral analgesia: yes  - Infection is improving: yes  - Color, warmth, movement, sensation of affected area or limb is intact or at baseline: yes  - Return to baseline functional status: yes  - Safe disposition plan has been identified: no

## 2019-05-08 NOTE — PLAN OF CARE
"Problem: Patient Care Overview  Goal: Plan of Care/Patient Progress Review  Outpatient/Observation goals to be met before discharge home:    - Tolerating oral antibiotics or has plans for home infusion set up.\, - UNMET- pending infection improving will transition to oral abx in AM.   - Vital signs normal or at patient baseline,- MET  - Adequate pain control on oral analgesia,- UNMET- IV Toradol required  - Infection is improving- Pending AM labs   - Color, warmth, movement, sensation of affected area or limb is intact or at baseline- UNMET- Nose remains red and swollen  - Return to baseline functional status,- MET  - Safe disposition plan has been identified- Pending CC f/u  - Nurse to notify provider when observation goals have been met and patient is ready for discharge.    /76 (BP Location: Left arm)   Pulse 65   Temp 98.5  F (36.9  C) (Oral)   Resp 18   Ht 1.905 m (6' 3\")   Wt 140.8 kg (310 lb 8 oz)   SpO2 97%   BMI 38.81 kg/m      "

## 2019-05-08 NOTE — PLAN OF CARE
"Problem: Patient Care Overview  Goal: Plan of Care/Patient Progress Review  Outpatient/Observation goals to be met before discharge home:     - Tolerating oral antibiotics or has plans for home infusion set up.\, - UNMET- pending infection improving will transition to oral abx in AM.   - Vital signs normal or at patient baseline,- MET  - Adequate pain control on oral analgesia,- UNMET- IV Toradol required  - Infection is improving- Pending AM labs   - Color, warmth, movement, sensation of affected area or limb is intact or at baseline- UNMET- Nose remains red and swollen  - Return to baseline functional status,- MET  - Safe disposition plan has been identified- Pending CC f/u  - Nurse to notify provider when observation goals have been met and patient is ready for discharge.    /68 (BP Location: Left arm)   Pulse 66   Temp 98.7  F (37.1  C) (Oral)   Resp 18   Ht 1.905 m (6' 3\")   Wt 140.8 kg (310 lb 8 oz)   SpO2 96%   BMI 38.81 kg/m      IV Toradol given for pain.   "

## 2019-05-08 NOTE — PLAN OF CARE
Observation goals PRIOR TO DISCHARGE     Comments: List all goals to be met before discharge home:   - Tolerating oral antibiotics or has plans for home infusion set up.\, NO: still on IV abx  - Vital signs normal or at patient baseline, YES  - Adequate pain control on oral analgesia, NO: pt stating oxycodone doesn't touch the pain. IV Toradol has been helping immensely  - Infection is improving, PENDING  - Color, warmth, movement, sensation of affected area or limb is intact or at baseline, NO: skin is red and swollen  - Return to baseline functional status, YES: pt up independently in room  - Safe disposition plan has been identified, PENDING  - Nurse to notify provider when observation goals have been met and patient is ready for discharge.

## 2019-10-30 ENCOUNTER — TELEPHONE (OUTPATIENT)
Dept: OPHTHALMOLOGY | Facility: CLINIC | Age: 39
End: 2019-10-30

## 2019-10-30 NOTE — TELEPHONE ENCOUNTER
[facility] eye nor PCP in [facility] area on file for care team-- no DONNA able to see by triage  Called [facility] medical group [phone]  and updated would need to call medical records and number provided [phone]  Rolando Alexis RN RN 8:55 AM 10/30/19        [facility] Call Center    Phone Message    May a detailed message be left on voicemail: yes    Reason for Call: Other: [facility] eye group is needing pts full eye exam from 2/14/19 faxed over- also needing it to state if he does/doesnt have diabetic retinopathy- faxed to [phone] thanks!     Action Taken: Message routed to:  [facility] ([facility] eye

## 2020-10-22 ENCOUNTER — TELEPHONE (OUTPATIENT)
Dept: FAMILY MEDICINE | Facility: OTHER | Age: 40
End: 2020-10-22

## 2020-10-22 ENCOUNTER — OFFICE VISIT (OUTPATIENT)
Dept: FAMILY MEDICINE | Facility: OTHER | Age: 40
End: 2020-10-22
Attending: PHYSICIAN ASSISTANT
Payer: COMMERCIAL

## 2020-10-22 VITALS
TEMPERATURE: 98 F | SYSTOLIC BLOOD PRESSURE: 124 MMHG | BODY MASS INDEX: 38.67 KG/M2 | RESPIRATION RATE: 20 BRPM | OXYGEN SATURATION: 97 % | DIASTOLIC BLOOD PRESSURE: 80 MMHG | WEIGHT: 311 LBS | HEIGHT: 75 IN | HEART RATE: 104 BPM

## 2020-10-22 DIAGNOSIS — I10 HYPERTENSION, GOAL BELOW 140/90: ICD-10-CM

## 2020-10-22 DIAGNOSIS — K04.7 TOOTH ABSCESS: Primary | ICD-10-CM

## 2020-10-22 DIAGNOSIS — N18.31 STAGE 3A CHRONIC KIDNEY DISEASE (H): ICD-10-CM

## 2020-10-22 DIAGNOSIS — F12.90 MARIJUANA SMOKER: ICD-10-CM

## 2020-10-22 DIAGNOSIS — F10.20 ALCOHOLISM (H): ICD-10-CM

## 2020-10-22 DIAGNOSIS — Z11.4 ENCOUNTER FOR SCREENING FOR HIV: ICD-10-CM

## 2020-10-22 DIAGNOSIS — Z11.59 ENCOUNTER FOR HEPATITIS C SCREENING TEST FOR LOW RISK PATIENT: ICD-10-CM

## 2020-10-22 DIAGNOSIS — Z00.00 ROUTINE HISTORY AND PHYSICAL EXAMINATION OF ADULT: Primary | ICD-10-CM

## 2020-10-22 DIAGNOSIS — K08.89 TOOTH PAIN: ICD-10-CM

## 2020-10-22 DIAGNOSIS — E66.01 MORBID OBESITY (H): ICD-10-CM

## 2020-10-22 DIAGNOSIS — F32.5 MAJOR DEPRESSION IN REMISSION (H): ICD-10-CM

## 2020-10-22 LAB
ALBUMIN SERPL-MCNC: 4.4 G/DL (ref 3.5–5.7)
ALP SERPL-CCNC: 46 U/L (ref 34–104)
ALT SERPL W P-5'-P-CCNC: 27 U/L (ref 7–52)
ANION GAP SERPL CALCULATED.3IONS-SCNC: 7 MMOL/L (ref 3–14)
AST SERPL W P-5'-P-CCNC: 27 U/L (ref 13–39)
BILIRUB SERPL-MCNC: 0.4 MG/DL (ref 0.3–1)
BUN SERPL-MCNC: 32 MG/DL (ref 7–25)
CALCIUM SERPL-MCNC: 9.8 MG/DL (ref 8.6–10.3)
CHLORIDE SERPL-SCNC: 102 MMOL/L (ref 98–107)
CO2 SERPL-SCNC: 30 MMOL/L (ref 21–31)
CREAT SERPL-MCNC: 1.63 MG/DL (ref 0.7–1.3)
GFR SERPL CREATININE-BSD FRML MDRD: 47 ML/MIN/{1.73_M2}
GLUCOSE SERPL-MCNC: 131 MG/DL (ref 70–105)
HIV1+2 AB SPEC QL IA.RAPID: NONREACTIVE
POTASSIUM SERPL-SCNC: 4.4 MMOL/L (ref 3.5–5.1)
PROT SERPL-MCNC: 6.8 G/DL (ref 6.4–8.9)
SODIUM SERPL-SCNC: 139 MMOL/L (ref 134–144)

## 2020-10-22 PROCEDURE — 80053 COMPREHEN METABOLIC PANEL: CPT | Mod: ZL | Performed by: PHYSICIAN ASSISTANT

## 2020-10-22 PROCEDURE — 86803 HEPATITIS C AB TEST: CPT | Mod: ZL | Performed by: PHYSICIAN ASSISTANT

## 2020-10-22 PROCEDURE — 99386 PREV VISIT NEW AGE 40-64: CPT | Performed by: PHYSICIAN ASSISTANT

## 2020-10-22 PROCEDURE — G0463 HOSPITAL OUTPT CLINIC VISIT: HCPCS

## 2020-10-22 PROCEDURE — 36415 COLL VENOUS BLD VENIPUNCTURE: CPT | Mod: ZL | Performed by: PHYSICIAN ASSISTANT

## 2020-10-22 PROCEDURE — 86703 HIV-1/HIV-2 1 RESULT ANTBDY: CPT | Mod: ZL | Performed by: PHYSICIAN ASSISTANT

## 2020-10-22 RX ORDER — LISINOPRIL 20 MG/1
1 TABLET ORAL DAILY
Status: ON HOLD | COMMUNITY
Start: 2020-10-13 | End: 2020-12-29

## 2020-10-22 RX ORDER — VENLAFAXINE HYDROCHLORIDE 225 MG/1
225 TABLET, EXTENDED RELEASE ORAL DAILY
Status: ON HOLD | COMMUNITY
Start: 2020-06-15 | End: 2020-12-29

## 2020-10-22 ASSESSMENT — PAIN SCALES - GENERAL: PAINLEVEL: EXTREME PAIN (8)

## 2020-10-22 ASSESSMENT — ANXIETY QUESTIONNAIRES
1. FEELING NERVOUS, ANXIOUS, OR ON EDGE: NOT AT ALL
5. BEING SO RESTLESS THAT IT IS HARD TO SIT STILL: SEVERAL DAYS
3. WORRYING TOO MUCH ABOUT DIFFERENT THINGS: SEVERAL DAYS
7. FEELING AFRAID AS IF SOMETHING AWFUL MIGHT HAPPEN: NOT AT ALL
GAD7 TOTAL SCORE: 3
6. BECOMING EASILY ANNOYED OR IRRITABLE: NOT AT ALL
2. NOT BEING ABLE TO STOP OR CONTROL WORRYING: SEVERAL DAYS
IF YOU CHECKED OFF ANY PROBLEMS ON THIS QUESTIONNAIRE, HOW DIFFICULT HAVE THESE PROBLEMS MADE IT FOR YOU TO DO YOUR WORK, TAKE CARE OF THINGS AT HOME, OR GET ALONG WITH OTHER PEOPLE: SOMEWHAT DIFFICULT

## 2020-10-22 ASSESSMENT — MIFFLIN-ST. JEOR: SCORE: 2401.32

## 2020-10-22 ASSESSMENT — PATIENT HEALTH QUESTIONNAIRE - PHQ9
SUM OF ALL RESPONSES TO PHQ QUESTIONS 1-9: 4
5. POOR APPETITE OR OVEREATING: NOT AT ALL

## 2020-10-22 NOTE — TELEPHONE ENCOUNTER
Checking on an antibiotic prescription that was suppose to be sent to [id number] for the patient.

## 2020-10-22 NOTE — PROGRESS NOTES
"SUBJECTIVE:   Girma Trejo is a 40 year old male here for the following health issues:    HPI  [facility] physical.  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 History of diabetes, A1c recently improved so was taken off of metformin. Recently drawn and was 5.8% on 9/17/2020. History of tobacco use, still smoking, not trying to quit, \"perhaps after treatment\". History of morbid obesity, very little exercise, walks laps around the building, COVID limits this, just started paying attention to what he eats. Lost about 100 lbs unintentionally, thinks it was due to using meth and eating very little. Doesn't want to regain this weight. Is requesting nutrition referral for mary management.  History of hypertension, on lisinopril, no myalgias or cough.     Also, he reports new bilateral upper molar pain and blames this on dental cavities with missing fillings. Has noted increased pain of the upper molars bilaterally in the last few weeks. Has been taking Ibuprofen and Tylenol. Had good relief initially with these but pain has been progressing lately. No fevers, chills, or night sweats. Has not noted any swelling, discharge, erythema, or bleeding. Hisotry of MRSA 2 years ago.     Examination PHQ 9 scores reviewed and can be seen below.    He is requesting hepatitis C and HIV screening today.  He is also requesting nutrition referral to help with weight loss.    GAD7  BRENT-7 SCORE 10/22/2020   Total Score 3       PHQ9  PHQ 10/22/2020   PHQ-9 Total Score 4   Q9: Thoughts of better off dead/self-harm past 2 weeks Not at all       Allergies:  Allergies   Allergen Reactions     Hydrocodone Rash     No problems with oxycodone and hydromorphone.     Pregabalin      Foot swelling     No Clinical Screening - See Comments      Other reaction(s): Runny Nose     " "Vilazodone Other (See Comments)     Blurred vision, racing heart       Review of Systems   As above otherwise ROS is unremarkable.     OBJECTIVE:     Vitals:    10/22/20 1403   BP: 124/80   BP Location: Right arm   Patient Position: Sitting   Cuff Size: Adult Large   Pulse: 104   Resp: 20   Temp: 98  F (36.7  C)   TempSrc: Temporal   SpO2: 97%   Weight: 141.1 kg (311 lb)   Height: 1.905 m (6' 3\")       Physical Exam  General Appearance: Pleasant, alert, appropriate appearance for age and circumstances, no acute distress  Head: Normocephalic, atraumatic  Eyes: PERRL, EOMI  Ears: TM's pearly gray and intact bilaterally. Normal auditory canals and external ears   OroPharynx: Dental hygiene inspection reveals missing fillings of the molars in the Bilaterally.  Otherwise, normal buccal mucosa. Normal pharynx. No exudates or petechia noted.  Tenderness to palpation with a tongue depressor of the upper molars bilaterally.  Neck: Supple, no masses or lymphadenopathy. Thyroid smooth and rubbery in texture without palpable nodules  Lungs: Normal chest wall and respirations. Clear to auscultation, no wheezes, rales, rhonchi, or stridor  Cardiovascular: Regular rate and rhythm. S1 and S2 audible, no murmurs, clicks, rubs, or gallops  Gastrointestinal: Obese abd, symmetrical, soft, nontender, no masses, guarding, or tympany, normoactive bowel sounds  Musculoskeletal: No discernable muscle atrophy or weakness; full joint range of motion, no instability, redness, swelling, or tenderness; no discernable spine deviation or gait abnormalities  Skin: no concerning or new rashes  Vascular: Radial, posterior tibial, and dorsalis pedis pulses present bilaterally; No lower extremity edema or varicose veins.  Neurologic Exam: CN 2-12 grossly intact.  Normal gait.  Symmetric DTRs, no focal motor or sensory deficits. No tremor.  Psychiatric Exam: Alert and oriented, appropriate affect    Diagnostic Test Results:  Results for orders placed or " performed in visit on 10/22/20   HIV Rapid Antibody Screen     Status: None   Result Value Ref Range    HIV Rapid Antibody Screen Nonreactive NR^Nonreactive   Comprehensive Metabolic Panel     Status: Abnormal   Result Value Ref Range    Sodium 139 134 - 144 mmol/L    Potassium 4.4 3.5 - 5.1 mmol/L    Chloride 102 98 - 107 mmol/L    Carbon Dioxide 30 21 - 31 mmol/L    Anion Gap 7 3 - 14 mmol/L    Glucose 131 (H) 70 - 105 mg/dL    Urea Nitrogen 32 (H) 7 - 25 mg/dL    Creatinine 1.63 (H) 0.70 - 1.30 mg/dL    GFR Estimate 47 (L) >60 mL/min/[1.73_m2]    GFR Estimate If Black 57 (L) >60 mL/min/[1.73_m2]    Calcium 9.8 8.6 - 10.3 mg/dL    Bilirubin Total 0.4 0.3 - 1.0 mg/dL    Albumin 4.4 3.5 - 5.7 g/dL    Protein Total 6.8 6.4 - 8.9 g/dL    Alkaline Phosphatase 46 34 - 104 U/L    ALT 27 7 - 52 U/L    AST 27 13 - 39 U/L       ASSESSMENT/PLAN:     1. Routine history and physical examination of adult    2. Morbid obesity (H)    3. Alcoholism (H)    4. Marijuana smoker    5. Hypertension, goal below 140/90    6. Major depression in remission (H)    7. Tooth pain    8. Encounter for hepatitis C screening test for low risk patient    9. Encounter for screening for HIV    10. Stage 3a chronic kidney disease        Penicillin for dental pain.     Hep C and HIV screen today.  HIV is negative nonreactive.  Awaiting hep C.    CMP reveals creatinine 1.63 and GFR 47.  Albumin was 4.4 and BUN was 32.  This may suggests mild to moderate kidney disease, but his history of physical exam revealed no fatigue, edema, nausea with/without vomiting, pruritus, restless legs, anorexia, or recent history of known infection-related glomerular disease.  As far as I know this is the first evidence of kidney disease.    In further work-up of these abnormal lab values I recommend urinalysis and kidney x-rays.  If these are unrevealing I would refer the patient to internal medicine for possible serologic testing and or kidney biopsy.     Weight  management/nutrition referral placed to help with weight loss and diet.    He is not interested in NRT or quitting smoking at this time.    I sent a message for him to schedule a UA and a KUB ultrasound.    We will follow-up after hepatitis C results.    Orders Placed This Encounter   Procedures     US Renal Complete     Comprehensive Metabolic Panel     Hepatitis C Screen Reflex to HCV RNA Quant and Genotype     HIV Rapid Antibody Screen     UA reflex to Microscopic and Culture     NUTRITION REFERRAL       PEGGY Kellogg  [facility]    This document was prepared using voice generated software.  While every attempt was made for accuracy, grammatical errors may exist.

## 2020-10-23 ENCOUNTER — TELEPHONE (OUTPATIENT)
Dept: FAMILY MEDICINE | Facility: OTHER | Age: 40
End: 2020-10-23

## 2020-10-23 RX ORDER — PENICILLIN V POTASSIUM 500 MG/1
500 TABLET, FILM COATED ORAL 3 TIMES DAILY
Qty: 30 TABLET | Refills: 0 | Status: SHIPPED | OUTPATIENT
Start: 2020-10-23 | End: 2020-11-02

## 2020-10-23 ASSESSMENT — ANXIETY QUESTIONNAIRES: GAD7 TOTAL SCORE: 3

## 2020-10-23 NOTE — TELEPHONE ENCOUNTER
After verifying pts name and date of birth with pt, pt notified he order the Ultrasound for further evaluation on his kidneys. Pt states understanding  Jocelyn Spear LPN

## 2020-10-23 NOTE — TELEPHONE ENCOUNTER
Thomas Gunn is not here today, and is scheduled to return Monday 10/26. Office visit note incomplete, and no antibiotic was ordered.     Called and spoke with staff at [facility], after verifying Pt's last name and . They state they believe the antibiotic was supposed to be for dental pain.    Per OV Note:  Also reports bilateral upper molar pain and has known dental cavities with missing fillings. Has noted increased pain in the last few weeks. Has been taking Ibuprofen and Tylenol. Had good relief initially with these but pain has been progressing lately. No fevers, chills, or night sweats. Has not noted any swelling, discharge, erythema. Hisotry of MRSA 2 years ago of nose and left ring finger.     Routing to teamlet for review. Hetal Valderrama RN .............. 10/23/2020  8:18 AM

## 2020-10-23 NOTE — TELEPHONE ENCOUNTER
After verifying pts name and date of birth with staff at [facility] they were notified the prescription was sent to [facility].  Jocelyn Spear LPN

## 2020-10-24 ENCOUNTER — ALLIED HEALTH/NURSE VISIT (OUTPATIENT)
Dept: FAMILY MEDICINE | Facility: OTHER | Age: 40
End: 2020-10-24
Attending: FAMILY MEDICINE
Payer: COMMERCIAL

## 2020-10-24 DIAGNOSIS — Z20.822 EXPOSURE TO 2019 NOVEL CORONAVIRUS: Primary | ICD-10-CM

## 2020-10-24 LAB — HCV AB SERPL QL IA: NONREACTIVE

## 2020-10-24 PROCEDURE — U0003 INFECTIOUS AGENT DETECTION BY NUCLEIC ACID (DNA OR RNA); SEVERE ACUTE RESPIRATORY SYNDROME CORONAVIRUS 2 (SARS-COV-2) (CORONAVIRUS DISEASE [COVID-19]), AMPLIFIED PROBE TECHNIQUE, MAKING USE OF HIGH THROUGHPUT TECHNOLOGIES AS DESCRIBED BY CMS-2020-01-R: HCPCS | Mod: ZL | Performed by: FAMILY MEDICINE

## 2020-10-24 PROCEDURE — C9803 HOPD COVID-19 SPEC COLLECT: HCPCS

## 2020-10-24 PROCEDURE — 99207 PR NO CHARGE NURSE ONLY: CPT

## 2020-10-26 ENCOUNTER — HOSPITAL ENCOUNTER (OUTPATIENT)
Dept: ULTRASOUND IMAGING | Facility: OTHER | Age: 40
Discharge: HOME | End: 2020-10-26
Attending: PHYSICIAN ASSISTANT | Admitting: PHYSICIAN ASSISTANT
Payer: COMMERCIAL

## 2020-10-26 DIAGNOSIS — N18.31 STAGE 3A CHRONIC KIDNEY DISEASE (H): ICD-10-CM

## 2020-10-26 LAB
SARS-COV-2 RNA SPEC QL NAA+PROBE: NOT DETECTED
SPECIMEN SOURCE: NORMAL

## 2020-10-26 PROCEDURE — 76770 US EXAM ABDO BACK WALL COMP: CPT

## 2020-10-28 ENCOUNTER — TELEPHONE (OUTPATIENT)
Dept: FAMILY MEDICINE | Facility: OTHER | Age: 40
End: 2020-10-28

## 2020-10-28 NOTE — TELEPHONE ENCOUNTER
Had an ultrasound on Monday and would like the results and had a Covid test on Saturday and would like the results.  Do not leave a message on the number given.

## 2020-10-28 NOTE — TELEPHONE ENCOUNTER
Spoke with Girma and after name and date of birth verified he was given the Covid test results and I read the finding and Impression of the renal ultrasound.  He said if there was anything other information  to give him  Call.  Norma J. Gosselin, LPN .......  10/28/2020  12:47 PM

## 2020-10-28 NOTE — TELEPHONE ENCOUNTER
Reason for call: Request for results.    Name of test or procedure: Ultrasound and Covid    Date of test or procedure: 10-26, and 10-24    Location of test or procedure: [facility]    Preferred method for responding to this message: Telephone Call    Phone number patient can be reached at: Other phone number:  [phone]    If we can't reach you directly, may we leave a detailed response at the number you provided?Nono

## 2020-12-21 ENCOUNTER — HOSPITAL ENCOUNTER (INPATIENT)
Facility: CLINIC | Age: 40
LOS: 9 days | Discharge: PSYCHIATRIC OR SUBSTANCE USE FACILITY | DRG: 897 | End: 2020-12-30
Attending: FAMILY MEDICINE | Admitting: PSYCHIATRY & NEUROLOGY
Payer: COMMERCIAL

## 2020-12-21 DIAGNOSIS — F41.9 ANXIETY: ICD-10-CM

## 2020-12-21 DIAGNOSIS — F33.2 SEVERE RECURRENT MAJOR DEPRESSION WITHOUT PSYCHOTIC FEATURES (H): ICD-10-CM

## 2020-12-21 DIAGNOSIS — Z20.828 EXPOSURE TO SARS-ASSOCIATED CORONAVIRUS: ICD-10-CM

## 2020-12-21 DIAGNOSIS — F10.24 ALCOHOL DEPENDENCE WITH ALCOHOL-INDUCED MOOD DISORDER (H): ICD-10-CM

## 2020-12-21 DIAGNOSIS — F15.24 OTHER STIMULANT DEPENDENCE WITH STIMULANT-INDUCED MOOD DISORDER (H): ICD-10-CM

## 2020-12-21 DIAGNOSIS — I10 HYPERTENSION, GOAL BELOW 140/90: Primary | ICD-10-CM

## 2020-12-21 DIAGNOSIS — R45.851 SUICIDAL IDEATION: ICD-10-CM

## 2020-12-21 DIAGNOSIS — F33.1 MAJOR DEPRESSIVE DISORDER, RECURRENT EPISODE, MODERATE (H): ICD-10-CM

## 2020-12-21 DIAGNOSIS — F19.10 POLYSUBSTANCE ABUSE (H): ICD-10-CM

## 2020-12-21 LAB
AMPHETAMINES UR QL SCN: NEGATIVE
BARBITURATES UR QL: NEGATIVE
BENZODIAZ UR QL: NEGATIVE
CANNABINOIDS UR QL SCN: NEGATIVE
COCAINE UR QL: NEGATIVE
ETHANOL UR QL SCN: NEGATIVE
GLUCOSE BLDC GLUCOMTR-MCNC: 140 MG/DL (ref 70–99)
LABORATORY COMMENT REPORT: NORMAL
OPIATES UR QL SCN: NEGATIVE
SARS-COV-2 RNA SPEC QL NAA+PROBE: NEGATIVE
SPECIMEN SOURCE: NORMAL

## 2020-12-21 PROCEDURE — 80307 DRUG TEST PRSMV CHEM ANLYZR: CPT | Performed by: FAMILY MEDICINE

## 2020-12-21 PROCEDURE — 99285 EMERGENCY DEPT VISIT HI MDM: CPT | Mod: 25 | Performed by: FAMILY MEDICINE

## 2020-12-21 PROCEDURE — C9803 HOPD COVID-19 SPEC COLLECT: HCPCS | Performed by: FAMILY MEDICINE

## 2020-12-21 PROCEDURE — 99285 EMERGENCY DEPT VISIT HI MDM: CPT | Performed by: FAMILY MEDICINE

## 2020-12-21 PROCEDURE — 80320 DRUG SCREEN QUANTALCOHOLS: CPT | Performed by: FAMILY MEDICINE

## 2020-12-21 PROCEDURE — 90791 PSYCH DIAGNOSTIC EVALUATION: CPT

## 2020-12-21 PROCEDURE — U0003 INFECTIOUS AGENT DETECTION BY NUCLEIC ACID (DNA OR RNA); SEVERE ACUTE RESPIRATORY SYNDROME CORONAVIRUS 2 (SARS-COV-2) (CORONAVIRUS DISEASE [COVID-19]), AMPLIFIED PROBE TECHNIQUE, MAKING USE OF HIGH THROUGHPUT TECHNOLOGIES AS DESCRIBED BY CMS-2020-01-R: HCPCS | Performed by: FAMILY MEDICINE

## 2020-12-21 PROCEDURE — 250N000013 HC RX MED GY IP 250 OP 250 PS 637: Performed by: FAMILY MEDICINE

## 2020-12-21 PROCEDURE — 999N001017 HC STATISTIC GLUCOSE BY METER IP

## 2020-12-21 PROCEDURE — 124N000002 HC R&B MH UMMC

## 2020-12-21 RX ORDER — HYDROXYZINE HYDROCHLORIDE 25 MG/1
25 TABLET, FILM COATED ORAL EVERY 4 HOURS PRN
Status: DISCONTINUED | OUTPATIENT
Start: 2020-12-21 | End: 2020-12-30 | Stop reason: HOSPADM

## 2020-12-21 RX ORDER — TRAZODONE HYDROCHLORIDE 50 MG/1
50 TABLET, FILM COATED ORAL
Status: DISCONTINUED | OUTPATIENT
Start: 2020-12-21 | End: 2020-12-30 | Stop reason: HOSPADM

## 2020-12-21 RX ORDER — LISINOPRIL 20 MG/1
20 TABLET ORAL DAILY
Status: DISCONTINUED | OUTPATIENT
Start: 2020-12-22 | End: 2020-12-30 | Stop reason: HOSPADM

## 2020-12-21 RX ORDER — MAGNESIUM HYDROXIDE/ALUMINUM HYDROXICE/SIMETHICONE 120; 1200; 1200 MG/30ML; MG/30ML; MG/30ML
30 SUSPENSION ORAL EVERY 4 HOURS PRN
Status: DISCONTINUED | OUTPATIENT
Start: 2020-12-21 | End: 2020-12-30 | Stop reason: HOSPADM

## 2020-12-21 RX ORDER — TRAZODONE HYDROCHLORIDE 50 MG/1
50 TABLET, FILM COATED ORAL
Status: ON HOLD | COMMUNITY
End: 2020-12-29

## 2020-12-21 RX ORDER — HYDROXYZINE HYDROCHLORIDE 25 MG/1
50 TABLET, FILM COATED ORAL ONCE
Status: COMPLETED | OUTPATIENT
Start: 2020-12-21 | End: 2020-12-21

## 2020-12-21 RX ORDER — ACETAMINOPHEN 325 MG/1
650 TABLET ORAL EVERY 4 HOURS PRN
Status: DISCONTINUED | OUTPATIENT
Start: 2020-12-21 | End: 2020-12-30 | Stop reason: HOSPADM

## 2020-12-21 RX ORDER — VENLAFAXINE HYDROCHLORIDE 75 MG/1
75 TABLET, EXTENDED RELEASE ORAL DAILY
Status: DISCONTINUED | OUTPATIENT
Start: 2020-12-22 | End: 2020-12-22

## 2020-12-21 RX ORDER — AMOXICILLIN 250 MG
1 CAPSULE ORAL 2 TIMES DAILY PRN
Status: DISCONTINUED | OUTPATIENT
Start: 2020-12-21 | End: 2020-12-30 | Stop reason: HOSPADM

## 2020-12-21 RX ADMIN — HYDROXYZINE HYDROCHLORIDE 50 MG: 25 TABLET, FILM COATED ORAL at 16:09

## 2020-12-21 ASSESSMENT — ACTIVITIES OF DAILY LIVING (ADL)
DRESS: INDEPENDENT;SCRUBS (BEHAVIORAL HEALTH)
ORAL_HYGIENE: INDEPENDENT
LAUNDRY: UNABLE TO COMPLETE
HYGIENE/GROOMING: INDEPENDENT

## 2020-12-21 ASSESSMENT — MIFFLIN-ST. JEOR: SCORE: 2379.09

## 2020-12-21 NOTE — ED TRIAGE NOTES
Per EMT report, pt lives with his grandmother and is in the process of getting housing help. Pt reports feeling unsafe at home and is afraid he may act on suicidal thoughts. Pt does have a hx of meth use. Per pt he last used meth three days ago. Pt said he was at [facility] recently for feeling suicidal but he thinks he was released too soon.

## 2020-12-21 NOTE — ED PROVIDER NOTES
"ED Provider Note  [facility]      History     Chief Complaint   Patient presents with     Suicidal     pt reports feeling unable to keep self safe at home. Pt does not have a SI plan at this time     HPI  Girma Trejo is a 40 year old male who has a history of anxiety, depression, PTSD, and polysubstance abuse states primarily alcohol and methamphetamine). Was in chemical dependency treatment in [place], subsequently about 3 weeks ago was discharged to live with his grandmother in the [facility]. He states immediately upon return to the [facility] he \"fell in with the wrong people\" relapsed, and started using alcohol marijuana and methamphetamine. Reports he got very down on himself feels hopeless, helpless, and states that when his mental health symptoms get worse then he falls deeper into the cycle of substance abuse, hopelessness and depression. He last used methamphetamine on December 17, alcohol December 19 and marijuana on December 19. On the 19th he was reportedly seen at the emergency department at [facility] for aggression, spent the night, was discharged in the morning. He states he was feeling suicidal and homicidal when he presented there, and states really that did not improve however he was discharged anyways. He has not taken his medications for a week. He is sleeping poorly he has no appetite. He feels irritable, hopeless, and states he is \"lost my will to live\". Reports suicidal thoughts and does not believe he is safe. He denies any hallucinations or homicidal thoughts at this time, but admits when using methamphetamine that often happens to him.    Past Medical History  Past Medical History:   Diagnosis Date     Alcoholism (H)     treatment multiple times, most recently 2016     Ankle fracture as child     Anxiety      Chicken pox      Chronic chest pain     wall pain vs anxiety, several ER visits and stress test negative     Concussion 2007     " Diabetes (H)      Hypertension, goal below 140/90      Major depression in remission (H) age 10    no suicide attempts, [facility], [facility]     Marijuana smoker     in [place]     Morbid obesity (H)      Nasal fracture      OCD (obsessive compulsive disorder)      Polysubstance (excluding opioids) dependence, binge pattern (H)     Prior meth abuse--treatment in 2016     Psoriasis of scalp      Smoker     1/3 ppd     Wrist fracture as child    right     Past Surgical History:   Procedure Laterality Date     APPENDECTOMY       ENT SURGERY      nasal fx     HC TOOTH EXTRACTION W/FORCEP       PE TUBES      as a child          ASPIRIN PO       hydrOXYzine (ATARAX) 50 MG tablet       lisinopril (PRINIVIL/ZESTRIL) 40 MG tablet       lisinopril (ZESTRIL) 20 MG tablet       loratadine (CLARITIN) 10 MG tablet       metFORMIN (GLUCOPHAGE-XR) 750 MG 24 hr tablet       oxyCODONE (ROXICODONE) 5 MG tablet       venlafaxine (EFFEXOR-ER) 225 MG 24 hr tablet       venlafaxine (EFFEXOR-XR) 150 MG 24 hr capsule      Allergies   Allergen Reactions     Hydrocodone Rash     No problems with oxycodone and hydromorphone.     Pregabalin      Foot swelling     No Clinical Screening - See Comments      Other reaction(s): Runny Nose     Vilazodone Other (See Comments)     Blurred vision, racing heart     Family History  Family History   Problem Relation Age of Onset     Depression Mother      Heart Surgery Mother         aortic valve abnormality     Hypertension Father      Pre-Diabetes Father      Diabetes Maternal Aunt      Diabetes Paternal Grandmother      Breast Cancer Maternal Aunt      Alcohol/Drug Maternal Uncle         and 1st cousin     Cancer Paternal Grandfather      Asthma No family hx of      C.A.D. No family hx of      Cerebrovascular Disease No family hx of      Cancer - colorectal No family hx of      Prostate Cancer No family hx of      Allergies No family hx of      Alzheimer Disease No family hx of      Anesthesia Reaction No family hx  of      Arthritis No family hx of      Blood Disease No family hx of      Circulatory No family hx of      Congenital Anomalies No family hx of      Connective Tissue Disorder No family hx of      Endocrine Disease No family hx of      Eye Disorder No family hx of      Gastrointestinal Disease No family hx of      Genitourinary Problems No family hx of      Gynecology No family hx of      Lipids No family hx of      Musculoskeletal Disorder No family hx of      Neurologic Disorder No family hx of      Obesity No family hx of      Osteoporosis No family hx of      Respiratory No family hx of      Thyroid Disease No family hx of      Social History   Social History     Tobacco Use     Smoking status: Former Smoker     Packs/day: 0.10     Years: 15.00     Pack years: 1.50     Smokeless tobacco: Never Used     Tobacco comment: socially, mostly quit in 2016   Substance Use Topics     Alcohol use: Yes     Comment: Occasional--about 1x per week. 2-6 drinks at a time     Drug use: Yes     Types: Marijuana     Comment: occasional marijuana--once every 3 or 4 months, prior meth abuse      Past medical history, past surgical history, medications, allergies, family history, and social history were reviewed with the patient. No additional pertinent items.       Review of Systems  A complete review of systems was performed with pertinent positives and negatives noted in the HPI, and all other systems negative.    Physical Exam   BP: 126/67  Pulse: 107  Temp: 98.1  F (36.7  C)  Resp: 16  SpO2: 95 %  Physical Exam  Constitutional:       General: He is not in acute distress.     Appearance: He is not diaphoretic.   HENT:      Head: Atraumatic.   Eyes:      General: No scleral icterus.     Pupils: Pupils are equal, round, and reactive to light.   Cardiovascular:      Heart sounds: Normal heart sounds.   Pulmonary:      Effort: No respiratory distress.      Breath sounds: Normal breath sounds.   Abdominal:      General: Bowel sounds  are normal.      Palpations: Abdomen is soft.      Tenderness: There is no abdominal tenderness.   Musculoskeletal:         General: No tenderness.   Skin:     General: Skin is warm.      Findings: No rash.   Neurological:      General: No focal deficit present.      Mental Status: He is oriented to person, place, and time.   Psychiatric:         Attention and Perception: Attention and perception normal.         Mood and Affect: Mood is anxious and depressed.         Speech: Speech normal.         Behavior: Behavior normal.         Thought Content: Thought content includes suicidal ideation. Thought content includes suicidal plan.         Cognition and Memory: Cognition normal.         Judgment: Judgment normal.         ED Course      Procedures                         Results for orders placed or performed during the hospital encounter of 12/21/20   Drug abuse screen 6 urine (tox)     Status: None   Result Value Ref Range    Amphetamine Qual Urine Negative NEG^Negative    Barbiturates Qual Urine Negative NEG^Negative    Benzodiazepine Qual Urine Negative NEG^Negative    Cannabinoids Qual Urine Negative NEG^Negative    Cocaine Qual Urine Negative NEG^Negative    Ethanol Qual Urine Negative NEG^Negative    Opiates Qualitative Urine Negative NEG^Negative   Glucose by meter     Status: Abnormal   Result Value Ref Range    Glucose 140 (H) 70 - 99 mg/dL     Medications   hydrOXYzine (ATARAX) tablet 50 mg (50 mg Oral Given 12/21/20 1608)        Assessments & Plan (with Medical Decision Making)   40-year-old with an extensive mental health and chemical dependency history including anxiety, depression, PTSD, and polysubstance abuse. Presenting today due to relapse substance use, severe anxiety, worsening depressive symptoms, and suicidal ideation.  The patient was also seen by the [facility] , please refer to their extensive note/evaluation which was reviewed with me and is documented in EPIC on 2020-12-21 for further  details.  After full assessment, a recommendation was made to admit inpatient for stabilization of his mental health issues. He is not intoxicated nor psychotic appearing today. He is not aggressive but cooperative. He admits to poor medication compliance. He reports suicidal ideation with a plan and inability to contract for safety. He was given hydroxyzine for anxiety and denied any symptoms of Covid. He appears appropriate for voluntary admission to our inpatient mental health unit.  I have reviewed the nursing notes. I have reviewed the findings, diagnosis, plan and need for follow up with the patient.    New Prescriptions    No medications on file       Final diagnoses:   Major depressive disorder, recurrent episode, moderate (H)   Suicidal ideation   Polysubstance abuse (H)       --  Raji Crawley MD  [facility] EMERGENCY DEPARTMENT  12/21/2020     Raji Crawley MD  12/21/20 [id number]

## 2020-12-21 NOTE — PHARMACY-ADMISSION MEDICATION HISTORY
Admission medication history interview status for the 12/21/2020 admission is complete. See Epic admission navigator for allergy information, pharmacy, prior to admission medications and immunization status.     Medication history interview sources:  Patient interview, [facility]    Changes made to PTA medication list (reason)  Added: Trazodone  Deleted: Aspirin, hydroxyzine PRN, loratadine PRN, metformin, oxycodone PRN  Changed: None    Additional medication history information (including reliability of information, actions taken by pharmacist): None    Prior to Admission medications    Medication Sig Last Dose Taking? Auth Provider   lisinopril (ZESTRIL) 20 MG tablet Take 1 tablet by mouth daily 12/21/2020 Yes Reported, Patient   traZODone (DESYREL) 50 MG tablet Take 50 mg by mouth nightly as needed for sleep Past Week Yes Unknown, Entered By History   venlafaxine (EFFEXOR-ER) 225 MG 24 hr tablet Take 225 mg by mouth daily 12/21/2020 Yes Reported, Patient     Medication history completed by:   Vee Phan, PharmD, BCPS  Clinical Pharmacist - ED, [facility] *[id number]

## 2020-12-21 NOTE — SAFE
Girma Trejo  December 21, 2020     Pt. presents with depression, anxiety and poly substance dependence with an increase in symptoms since he discharged from [place] and returned to [place] and started using drugs again. At this times his symptoms have him feeling fearful that he will use again leading to an accidental overdose or an intentional suicide attempt. He does want help and is seeking treatment following mental health stabilization.         Current Suicidal Ideation/Self-Injurious Concerns/Methods: Other Thoughts only    Inappropriate Sexual Behavior: No    Aggression/Homicidal Ideation: None - N/A      For additional details see full DEC assessment.       JULY Byers

## 2020-12-21 NOTE — ED NOTES
Bed: ED16C  Expected date: 12/21/20  Expected time: 4:15 AM  Means of arrival: Police  Comments:   police, 41yo male, mental health eval

## 2020-12-22 LAB
ALBUMIN SERPL-MCNC: 3.7 G/DL (ref 3.4–5)
ALP SERPL-CCNC: 62 U/L (ref 40–150)
ALT SERPL W P-5'-P-CCNC: 39 U/L (ref 0–70)
ANION GAP SERPL CALCULATED.3IONS-SCNC: 4 MMOL/L (ref 3–14)
AST SERPL W P-5'-P-CCNC: 19 U/L (ref 0–45)
BASOPHILS # BLD AUTO: 0 10E9/L (ref 0–0.2)
BASOPHILS NFR BLD AUTO: 0.6 %
BILIRUB SERPL-MCNC: 0.5 MG/DL (ref 0.2–1.3)
BUN SERPL-MCNC: 21 MG/DL (ref 7–30)
CALCIUM SERPL-MCNC: 9.3 MG/DL (ref 8.5–10.1)
CHLORIDE SERPL-SCNC: 103 MMOL/L (ref 94–109)
CHOLEST SERPL-MCNC: 154 MG/DL
CO2 SERPL-SCNC: 32 MMOL/L (ref 20–32)
CREAT SERPL-MCNC: 1.08 MG/DL (ref 0.66–1.25)
DIFFERENTIAL METHOD BLD: NORMAL
EOSINOPHIL # BLD AUTO: 0.2 10E9/L (ref 0–0.7)
EOSINOPHIL NFR BLD AUTO: 3 %
ERYTHROCYTE [DISTWIDTH] IN BLOOD BY AUTOMATED COUNT: 11.9 % (ref 10–15)
GFR SERPL CREATININE-BSD FRML MDRD: 84 ML/MIN/{1.73_M2}
GLUCOSE SERPL-MCNC: 132 MG/DL (ref 70–99)
HCT VFR BLD AUTO: 46.7 % (ref 40–53)
HDLC SERPL-MCNC: 37 MG/DL
HGB BLD-MCNC: 15.7 G/DL (ref 13.3–17.7)
IMM GRANULOCYTES # BLD: 0 10E9/L (ref 0–0.4)
IMM GRANULOCYTES NFR BLD: 0.2 %
LDLC SERPL CALC-MCNC: 94 MG/DL
LYMPHOCYTES # BLD AUTO: 1.8 10E9/L (ref 0.8–5.3)
LYMPHOCYTES NFR BLD AUTO: 34.4 %
MCH RBC QN AUTO: 30.5 PG (ref 26.5–33)
MCHC RBC AUTO-ENTMCNC: 33.6 G/DL (ref 31.5–36.5)
MCV RBC AUTO: 91 FL (ref 78–100)
MONOCYTES # BLD AUTO: 0.4 10E9/L (ref 0–1.3)
MONOCYTES NFR BLD AUTO: 8.4 %
NEUTROPHILS # BLD AUTO: 2.8 10E9/L (ref 1.6–8.3)
NEUTROPHILS NFR BLD AUTO: 53.4 %
NONHDLC SERPL-MCNC: 117 MG/DL
NRBC # BLD AUTO: 0 10*3/UL
NRBC BLD AUTO-RTO: 0 /100
PLATELET # BLD AUTO: 179 10E9/L (ref 150–450)
POTASSIUM SERPL-SCNC: 4.4 MMOL/L (ref 3.4–5.3)
PROT SERPL-MCNC: 6.9 G/DL (ref 6.8–8.8)
RBC # BLD AUTO: 5.14 10E12/L (ref 4.4–5.9)
SODIUM SERPL-SCNC: 139 MMOL/L (ref 133–144)
TRIGL SERPL-MCNC: 116 MG/DL
TSH SERPL DL<=0.005 MIU/L-ACNC: 0.92 MU/L (ref 0.4–4)
WBC # BLD AUTO: 5.3 10E9/L (ref 4–11)

## 2020-12-22 PROCEDURE — 84443 ASSAY THYROID STIM HORMONE: CPT | Performed by: PSYCHIATRY & NEUROLOGY

## 2020-12-22 PROCEDURE — 36415 COLL VENOUS BLD VENIPUNCTURE: CPT | Performed by: PSYCHIATRY & NEUROLOGY

## 2020-12-22 PROCEDURE — 80061 LIPID PANEL: CPT | Performed by: PSYCHIATRY & NEUROLOGY

## 2020-12-22 PROCEDURE — 124N000002 HC R&B MH UMMC

## 2020-12-22 PROCEDURE — 85025 COMPLETE CBC W/AUTO DIFF WBC: CPT | Performed by: PSYCHIATRY & NEUROLOGY

## 2020-12-22 PROCEDURE — 250N000013 HC RX MED GY IP 250 OP 250 PS 637: Performed by: PSYCHIATRY & NEUROLOGY

## 2020-12-22 PROCEDURE — 80053 COMPREHEN METABOLIC PANEL: CPT | Performed by: PSYCHIATRY & NEUROLOGY

## 2020-12-22 PROCEDURE — 99223 1ST HOSP IP/OBS HIGH 75: CPT | Mod: GT | Performed by: PSYCHIATRY & NEUROLOGY

## 2020-12-22 RX ORDER — VENLAFAXINE HYDROCHLORIDE 150 MG/1
150 TABLET, EXTENDED RELEASE ORAL DAILY
Status: DISCONTINUED | OUTPATIENT
Start: 2020-12-23 | End: 2020-12-30 | Stop reason: HOSPADM

## 2020-12-22 RX ADMIN — VENLAFAXINE HYDROCHLORIDE 75 MG: 75 TABLET, EXTENDED RELEASE ORAL at 11:01

## 2020-12-22 RX ADMIN — LISINOPRIL 20 MG: 20 TABLET ORAL at 10:21

## 2020-12-22 ASSESSMENT — ACTIVITIES OF DAILY LIVING (ADL)
ORAL_HYGIENE: INDEPENDENT
HYGIENE/GROOMING: INDEPENDENT
LAUNDRY: WITH SUPERVISION
HYGIENE/GROOMING: INDEPENDENT
LAUNDRY: WITH SUPERVISION
ORAL_HYGIENE: INDEPENDENT
DRESS: INDEPENDENT
DRESS: SCRUBS (BEHAVIORAL HEALTH);INDEPENDENT

## 2020-12-22 ASSESSMENT — MIFFLIN-ST. JEOR: SCORE: 2361.85

## 2020-12-22 NOTE — ED NOTES
Pt resting in room watching TV.  Attempted to call Pt report and was told by the nurse on the unit that she would call back when they were done reviewing the chart.
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
## 2020-12-22 NOTE — ED NOTES
ED to Behavioral Floor Handoff    SITUATION  Girma Trejo is a 40 year old male who speaks English and lives in a home with family members The patient arrived in the ED by ambulance from emergency room with a complaint of Suicidal (pt reports feeling unable to keep self safe at home. Pt does not have a SI plan at this time)  .The patient's current symptoms started/worsened 2 week(s) ago and during this time the symptoms have increased.   In the ED, pt was diagnosed with   Final diagnoses:   Major depressive disorder, recurrent episode, moderate (H)   Suicidal ideation   Polysubstance abuse (H)        Initial vitals were: BP: 126/67  Pulse: 107  Temp: 98.1  F (36.7  C)  Resp: 16  SpO2: 95 %   --------  Is the patient diabetic? No   If yes, last blood glucose? --     If yes, was this treated in the ED? --  --------  Is the patient inebriated (ETOH) No or Impaired on other substances? No  MSSA done? No  Last MSSA score: --    Were withdrawal symptoms treated? No  Does the patient have a seizure history? No. If yes, date of most recent seizure--  --------  Is the patient patient experiencing suicidal ideation? reports occasional suicidal thoughts representing feeling that life is not worth feeling    Homicidal ideation? denies current or recent homicidal ideation or behaviors.    Self-injurious behavior/urges? denies current or recent self injurious behavior or ideation.  ------  Was pt aggressive in the ED No  Was a code called No  Is the pt now cooperative? Yes  -------  Meds given in ED:   Medications   hydrOXYzine (ATARAX) tablet 50 mg (50 mg Oral Given 12/21/20 5520)      Family present during ED course? No  Family currently present? No    BACKGROUND  Does the patient have a cognitive impairment or developmental disability? No  Allergies:   Allergies   Allergen Reactions     Hydrocodone Rash     No problems with oxycodone and hydromorphone.     Pregabalin      Foot swelling     No Clinical Screening - See  Comments      Other reaction(s): Runny Nose     Vilazodone Other (See Comments)     Blurred vision, racing heart   .   Social demographics are   Social History     Socioeconomic History     Marital status: Single     Spouse name: None     Number of children: None     Years of education: None     Highest education level: None   Occupational History     None   Social Needs     Financial resource strain: None     Food insecurity     Worry: None     Inability: None     Transportation needs     Medical: None     Non-medical: None   Tobacco Use     Smoking status: Former Smoker     Packs/day: 0.10     Years: 15.00     Pack years: 1.50     Smokeless tobacco: Never Used     Tobacco comment: socially, mostly quit in 2016   Substance and Sexual Activity     Alcohol use: Yes     Comment: Occasional--about 1x per week. 2-6 drinks at a time     Drug use: Yes     Types: Marijuana     Comment: occasional marijuana--once every 3 or 4 months, prior meth abuse     Sexual activity: Not Currently     Partners: Female     Birth control/protection: Condom   Lifestyle     Physical activity     Days per week: None     Minutes per session: None     Stress: None   Relationships     Social connections     Talks on phone: None     Gets together: None     Attends [employer] service: None     Active member of club or organization: None     Attends meetings of clubs or organizations: None     Relationship status: None     Intimate partner violence     Fear of current or ex partner: None     Emotionally abused: None     Physically abused: None     Forced sexual activity: None   Other Topics Concern     Parent/sibling w/ CABG, MI or angioplasty before 65F 55M? Not Asked   Social History Narrative    Works at [employer] in the kitchen        ASSESSMENT  Labs results   Labs Ordered and Resulted from Time of ED Arrival Up to the Time of Departure from the ED   GLUCOSE BY METER - Abnormal; Notable for the following components:       Result  Value    Glucose 140 (*)     All other components within normal limits   DRUG ABUSE SCREEN 6 CHEM DEP URINE ([facility])   SARS-COV-2 (COVID-19) VIRUS RT-PCR      Imaging Studies: No results found for this or any previous visit (from the past 24 hour(s)).   Most recent vital signs /64   Pulse 101   Temp 98.1  F (36.7  C) (Oral)   Resp 20   SpO2 98%    Abnormal labs/tests/findings requiring intervention:---   Pain control: pt had none  Nausea control: pt had none    RECOMMENDATION  Are any infection precautions needed (MRSA, VRE, etc.)? No If yes, what infection? --  ---  Does the patient have mobility issues? independently. If yes, what device does the pt use? ---  ---  Is patient on 72 hour hold or commitment? No If on 72 hour hold, have hold and rights been given to patient? No  Are admitting orders written if after 10 p.m. ?No  Tasks needing to be completed:---     Peyman Hernández RN   [facility]   [phone] [place] [facility]   [phone] [facility]

## 2020-12-22 NOTE — H&P
[facility], [place]   Psychiatric History and Physical  Admission date: 12/21/2020      Attestation:  Video-Visit Details    Type of service:  Video Visit    Video Start Time (time video started): 1030    Video End Time (time video stopped): 1051    Originating Location (pt. Location): Psychiatric inpatient    Distant Location (provider location): Provider remote location    Mode of Communication:  Video Conference via [employer]    Physician has received verbal consent for a Video Visit from the patient? Yes    CHIEF COMPLAINT:  Suicidal ideation    HPI:    Girma Trejo is a 40 year old male who has a history of anxiety, depression, PTSD, and polysubstance abuse states primarily alcohol and methamphetamines, who was seen at [facility] 3 days ago referred by his  Malina from [facility]. with agitation and suicidal and homicidal ideation, under the influence of methamphetamines.  Upon clearing from methamphetamines, patient no longer had suicidal or homicidal ideation, however agreed to an admission.  Labs are reviewed, unremarkable, urine drug screen is negative.  ER notes and DEC assessment are also reviewed.    Patient reports that he has a history of anxiety, PTSD, and depression, states that he relapsed on meth and became suicidal.  Patient states that he was in [facility] treatment back in September, subsequently went to [facility] in [place] and then was referred to a group home in [place], where he spent about a month, however did not like it and decided to move back with his grandmother while he was waiting for [facility] housing.  He states that he has been working with [facility] and has a  Malina there, states that he does not want to return to [place], however would not mind going to a different group home and does not want to be around the [facility] anymore because this is the environment where he will likely relapse again.   "Patient states that he relapsed a few weeks ago, smoking marijuana, drinking alcohol and using meth.  Patient reports a longstanding history of substance use dating back to his early teens.  He states that he has tried multiple medications for depression, most recently was on Effexor up to 225 mg [place] however stopped taking it about a week ago.  He stated that yesterday he did take about half of his usual dose, he was restarted on Effexor in the hospital.  Patient reports that even when he is sober, which amounts only to a few months, he has depression, and usually when he is depressed he cannot function at all.  He described inability to sleep, failing hygiene, decreased appetite, hopelessness, low energy, inability to concentrate, feeling suicidal.  He also states that when he uses meth, he experiences paranoid ideation, feeling that somebody is watching the house and following him.  His depression is 8-9 out of 10 currently denies any psychotic symptoms.  States that he is very much interested in returning to treatment and \"ending the cycle\" of relapses and rehospitalization's.    PAST PSYCHIATRIC HISTORY:    Patient reports a history of 7-8 psychiatric hospitalizations, as well as similar amount of treatments.  He states that his first treatment was 12 to 15 years ago, most recent one was in [facility] and prior to that 2 years ago.  Multiple medication trials the names of the medications he cannot recall.  He denies having a psychiatrist at this time.  States that Effexor was prescribed to him by primary care doctor.  He does have a  with [employer]  Denies any history of suicide attempts.    SUBSTANCE USE HISTORY:    States that he has been using substances from his early teens.  Reported maximum \"a few months\" of sobriety.  Stated that he used anything and everything \"you name it\".  Admits to alcohol, methamphetamines, heroin, cocaine, acid, some K2 among " other things.  States that his drug of choice is methamphetamines.  He denies drinking too much alcohol lately, denied complicated withdrawal.    PAST MEDICAL HISTORY:    PAST MEDICAL HISTORY:   Past Medical History:   Diagnosis Date     Alcoholism (H)     treatment multiple times, most recently 2016     Ankle fracture as child     Anxiety      Chicken pox      Chronic chest pain     wall pain vs anxiety, several ER visits and stress test negative     Concussion 2007     Diabetes (H)      Hypertension, goal below 140/90      Major depression in remission (H) age 10    no suicide attempts, [facility], [facility]     Marijuana smoker     in [place]     Morbid obesity (H)      Nasal fracture      OCD (obsessive compulsive disorder)      Polysubstance (excluding opioids) dependence, binge pattern (H)     Prior meth abuse--treatment in 2016     Psoriasis of scalp      Smoker     1/3 ppd     Wrist fracture as child    right       PAST SURGICAL HISTORY:   Past Surgical History:   Procedure Laterality Date     APPENDECTOMY       ENT SURGERY      nasal fx     HC TOOTH EXTRACTION W/FORCEP       PE TUBES      as a child         FAMILY HISTORY:  States that he is not aware of any history of mental illness or substance use in the family, contrary to the record below.    FAMILY HISTORY:   Family History   Problem Relation Age of Onset     Depression Mother      Heart Surgery Mother         aortic valve abnormality     Hypertension Father      Pre-Diabetes Father      Diabetes Maternal Aunt      Diabetes Paternal Grandmother      Breast Cancer Maternal Aunt      Alcohol/Drug Maternal Uncle         and 1st cousin     Cancer Paternal Grandfather      Asthma No family hx of      C.A.D. No family hx of      Cerebrovascular Disease No family hx of      Cancer - colorectal No family hx of      Prostate Cancer No family hx of      Allergies No family hx of      Alzheimer Disease No family hx of      Anesthesia Reaction No family hx of      Arthritis No  "family hx of      Blood Disease No family hx of      Circulatory No family hx of      Congenital Anomalies No family hx of      Connective Tissue Disorder No family hx of      Endocrine Disease No family hx of      Eye Disorder No family hx of      Gastrointestinal Disease No family hx of      Genitourinary Problems No family hx of      Gynecology No family hx of      Lipids No family hx of      Musculoskeletal Disorder No family hx of      Neurologic Disorder No family hx of      Obesity No family hx of      Osteoporosis No family hx of      Respiratory No family hx of      Thyroid Disease No family hx of        SOCIAL HISTORY:    Reports not having any siblings.  States that he is not close to his father and his mother is .  He denies any history of marriages or having children.  States that working has always been a problem for him.    Please see the full psychosocial profile from the clinical treatment coordinator.   SOCIAL HISTORY:   Social History     Tobacco Use     Smoking status: Former Smoker     Packs/day: 0.10     Years: 15.00     Pack years: 1.50     Smokeless tobacco: Never Used     Tobacco comment: socially, mostly quit in 2016   Substance Use Topics     Alcohol use: Yes     Comment: Occasional--about 1x per week. 2-6 drinks at a time       PHYSICAL ROS:  The patient endorsed no side effects from medications.  He reports having some back pain due to \"a couple of bad vertebrae\".. The remainder of 10-point review of systems was negative except as noted in HPI.    PTA MEDICATIONS:  Medications Prior to Admission   Medication Sig Dispense Refill Last Dose     lisinopril (ZESTRIL) 20 MG tablet Take 1 tablet by mouth daily   2020     traZODone (DESYREL) 50 MG tablet Take 50 mg by mouth nightly as needed for sleep   Past Week     venlafaxine (EFFEXOR-ER) 225 MG 24 hr tablet Take 225 mg by mouth daily   2020          Allergies:  Allergies   Allergen Reactions     Hydrocodone Rash     No " problems with oxycodone and hydromorphone.     Pregabalin      Foot swelling     No Clinical Screening - See Comments      Other reaction(s): Runny Nose     Vilazodone Other (See Comments)     Blurred vision, racing heart          Labs:  Recent Results (from the past 48 hour(s))   Drug abuse screen 6 urine (tox)    Collection Time: 12/21/20  2:35 PM   Result Value Ref Range    Amphetamine Qual Urine Negative NEG^Negative    Barbiturates Qual Urine Negative NEG^Negative    Benzodiazepine Qual Urine Negative NEG^Negative    Cannabinoids Qual Urine Negative NEG^Negative    Cocaine Qual Urine Negative NEG^Negative    Ethanol Qual Urine Negative NEG^Negative    Opiates Qualitative Urine Negative NEG^Negative   Glucose by meter    Collection Time: 12/21/20  3:01 PM   Result Value Ref Range    Glucose 140 (H) 70 - 99 mg/dL   Asymptomatic SARS-CoV-2 COVID-19 Virus (Coronavirus) by PCR    Collection Time: 12/21/20  4:23 PM    Specimen: Nasopharyngeal   Result Value Ref Range    SARS-CoV-2 Virus Specimen Source Nasopharyngeal     SARS-CoV-2 PCR Result NEGATIVE     SARS-CoV-2 PCR Comment       Testing was performed using the Xpert Xpress SARS-CoV-2 Assay on the Cepheid Gene-Xpert   Instrument Systems. Additional information about this Emergency Use Authorization (EUA)   assay can be found via the Lab Guide.     CBC with platelets differential    Collection Time: 12/22/20  7:39 AM   Result Value Ref Range    WBC 5.3 4.0 - 11.0 10e9/L    RBC Count 5.14 4.4 - 5.9 10e12/L    Hemoglobin 15.7 13.3 - 17.7 g/dL    Hematocrit 46.7 40.0 - 53.0 %    MCV 91 78 - 100 fl    MCH 30.5 26.5 - 33.0 pg    MCHC 33.6 31.5 - 36.5 g/dL    RDW 11.9 10.0 - 15.0 %    Platelet Count 179 150 - 450 10e9/L    Diff Method Automated Method     % Neutrophils 53.4 %    % Lymphocytes 34.4 %    % Monocytes 8.4 %    % Eosinophils 3.0 %    % Basophils 0.6 %    % Immature Granulocytes 0.2 %    Nucleated RBCs 0 0 /100    Absolute Neutrophil 2.8 1.6 - 8.3 10e9/L     "Absolute Lymphocytes 1.8 0.8 - 5.3 10e9/L    Absolute Monocytes 0.4 0.0 - 1.3 10e9/L    Absolute Eosinophils 0.2 0.0 - 0.7 10e9/L    Absolute Basophils 0.0 0.0 - 0.2 10e9/L    Abs Immature Granulocytes 0.0 0 - 0.4 10e9/L    Absolute Nucleated RBC 0.0    Comprehensive metabolic panel    Collection Time: 12/22/20  7:39 AM   Result Value Ref Range    Sodium 139 133 - 144 mmol/L    Potassium 4.4 3.4 - 5.3 mmol/L    Chloride 103 94 - 109 mmol/L    Carbon Dioxide 32 20 - 32 mmol/L    Anion Gap 4 3 - 14 mmol/L    Glucose 132 (H) 70 - 99 mg/dL    Urea Nitrogen 21 7 - 30 mg/dL    Creatinine 1.08 0.66 - 1.25 mg/dL    GFR Estimate 84 >60 mL/min/[1.73_m2]    GFR Estimate If Black >90 >60 mL/min/[1.73_m2]    Calcium 9.3 8.5 - 10.1 mg/dL    Bilirubin Total 0.5 0.2 - 1.3 mg/dL    Albumin 3.7 3.4 - 5.0 g/dL    Protein Total 6.9 6.8 - 8.8 g/dL    Alkaline Phosphatase 62 40 - 150 U/L    ALT 39 0 - 70 U/L    AST 19 0 - 45 U/L   TSH with free T4 reflex and/or T3 as indicated    Collection Time: 12/22/20  7:39 AM   Result Value Ref Range    TSH 0.92 0.40 - 4.00 mU/L   Lipid panel    Collection Time: 12/22/20  7:39 AM   Result Value Ref Range    Cholesterol 154 <200 mg/dL    Triglycerides 116 <150 mg/dL    HDL Cholesterol 37 (L) >39 mg/dL    LDL Cholesterol Calculated 94 <100 mg/dL    Non HDL Cholesterol 117 <130 mg/dL          Physical and Psychiatric Examination:    /79   Pulse 117   Temp 97.7  F (36.5  C) (Oral)   Resp 16   Ht 1.905 m (6' 3\")   Wt 137.1 kg (302 lb 4.8 oz)   SpO2 97%   BMI 37.78 kg/m    Weight is 302 lbs 4.8 oz  Body mass index is 37.78 kg/m .    PHYSICAL EXAM:  I have reviewed the physical exam as documented by by the medical team and agree with findings and assessment and have no additional findings to add at this time.    MENTAL STATUS EXAM:  Appearance: obese, desheveled, not in acute distress  Attitude:  cooperative  Eye Contact:  good  Mood: Neutral  Affect: Appropriate, constricted  Speech:  clear, " coherent  Language: fluent and intact in English  Psychomotor behavior: no psychomotor agitation or retardation  Gait and Station:  normal  Thought Process:  logical  Associations:  no loose associations  Thought Content:  Denies auditory or visual hallucinations, no evidence for psychosis, denies suicidal/homicidal ideation  Insight:  fair  Judgement:  fair  Oriented to:  time, person, and place  Attention Span and Concentration:  fair  Recent and Remote Memory:  intact  Fund of Knowledge:  consistent with education and life experiences    ADMISSION DIAGNOSES:  Mood disorder, substance-induced  Amphetamine dependence  Major depressive disorder by history  Anxiety, NOS    ASSESSMENT/PLAN:    40-year-old male with longstanding history of polysubstance abuse, mostly methamphetamines.  Difficult to tell if he has a primary mood disorder given that he is longus sobriety is reportedly only a few months.  Had multiple treatments, and likely good medication compliance, so it is difficult to tell whether he has had adequate medication trials.  Once the end cycle of readmissions, states desire to go back to treatment with appropriate aftercare outside of [facility] to minimize chances for relapse.    1. We will admit for safety and stabilization  2. We will continue with Effexor given patient's reports of its effectiveness in the past.  We will increase the dose to 150 mg tomorrow morning.  3. Order CD consult, start working on finding an appropriate treatment program  4. Anticipate 5 to 7 days      Reason for ongoing admission: poses an imminent risk to self  Discharge location: Chemical dependency treatment facility  Discharge Medications: not ordered  Follow-up Appointments: not scheduled  Legal Status: voluntary    Entered by: Luisito Parks MD on 12/22/2020 at 10:30 AM

## 2020-12-22 NOTE — PLAN OF CARE
Work Completed: FLORENTINO spoke with pt's therapist, Malina, through [facility]. Had pt sign DONNA. She provided information about recent referrals she has placed for [facility], TCM and Psych, all through [facility]. She also recommends IRTS placement for pt.     WR completed psycho social assessment, new team note, reviewed chart, attended team.     Today pt stated that he was feeling very tired and he wanted to take the day to rest and that tomorrow he will be more willing to engage, PPOC/strengths-vvulnerabilities will be completed tomorrow.     Discharge plan or goal: Discharge is not yet established.                 Barriers to discharge: Pt was admitted last night to the unit and will need more time to be evaluated for safety before a discharge plan can be ascertained.

## 2020-12-22 NOTE — PLAN OF CARE
"  Problem: Suicidal Behavior  Goal: Suicidal Behavior is Absent or Managed  Outcome: No Change    Girma Keenan is a 40 year old male whom is a voluntary admission evaluated in the [facility] admitted to station 30. He presents cooperative and tired. He stated \"I'm at the end of my rope with anxiety and Addiction issues. He recently spent some months in CD treatment in [place], [place] and then went to a sober [facility]. Was sober for 90 days. When he returned to the [facility] 8 weeks ago started using Meth and Alcohol. Girma is complaining of feeling suicidal and overdosing on his medication. He remarked that \"I'm very hopeless with my addiction issues. I'm hoping to get a rule 25 while I am here.\" He last used Alcohol two days ago. Stated \"Only had a few, not going to withdraw.\" Last used meth one day ago.  Has a flat, blunted affect. He is able to contract for safety. Agreeable to let staff know if he should feel like hurting himself. He denies any AH/VH.     Medical: HTN, Stated \"The chart says I'm Diabetic but that is a mistake I am not diabetic.\"      Plan: Provide for Safety, Encourage medication Compliance, Monitor and Assess Mood and Behavior, Develop Trust.      "

## 2020-12-22 NOTE — PLAN OF CARE
BEHAVIORAL TEAM DISCUSSION    Participants: Mindy Brothers, Francine Gaona OT, Daniela Jackson RN     Progress: Day 1. Pt is voluntary. Came to the ED via police due to SI.     Anticipated length of stay: 1-2 weeks     Continued Stay Criteria/Rationale: Pt needs to be evaluated by physical and psychosocial assessment. Pt needs to be evaluated for safety and discharge plan needs to be established.     Medical/Physical: None     Precautions:   Behavioral Orders   Procedures     Code 1 - Restrict to Unit     Discontinue 1:1 attendant for suicide risk     Order Specific Question:   I have performed an in person assessment of the patient     Answer:   Based on this assessment the patient no longer requires a one on one attendant at this point in time.     Routine Programming     As clinically indicated     Status 15     Every 15 minutes.     Suicide precautions     Patients on Suicide Precautions should have a Combination Diet ordered that includes a Diet selection(s) AND a Behavioral Tray selection for Safe Tray - with utensils, or Safe Tray - NO utensils       Plan: Patient has been admitted to the psychiatric unit for stabilization.  Patient will be seen and assessed by psychiatric doctor.  Medication changes will be reviewed and monitored.  Groups will be offered to assist patient with increasing knowledge, strategies, and copping techniques to help manage mental health.  CTC will meet with patient to provide individualized mental health resources and support throughout patient s hospitalization.  CTC will assist with developing a Care Plan and after care appointments.    Rationale for change in precautions or plan: New admit

## 2020-12-22 NOTE — PROGRESS NOTES
12/21/20 2023   Patient Belongings   Patient Belongings remains with patient;locker   Patient Belongings Remaining with Patient other (see comments)   Patient Belongings Put in Hospital Secure Location (Security or Locker, etc.) other (see comments)   Belongings Search Yes   Clothing Search Yes   Second Staff Wilmar DELCID ([address])       On Patient:  Grey cut-off tshirt, grey fleece, underwear, socks    Locker #[id number]:  Black leather wallet ([place], insurance cards, misc business cards, $2.00 cash), baseball cap, pajama pants, face mask, white shoes, camel cigarettes (7), red lighter    Security Envelope #[id number]:  EBT ([id number])      ..A               Admission:  I am responsible for any personal items that are not sent to the safe or pharmacy.  [facility] is not responsible for loss, theft or damage of any property in my possession.    Signature:  _________________________________ Date: _______  Time: _____                                              Staff Signature:  ____________________________ Date: ________  Time: _____      2nd Staff person, if patient is unable/unwilling to sign:    Signature: ________________________________ Date: ________  Time: _____     Discharge:  [facility] has returned all of my personal belongings:    Signature: _________________________________ Date: ________  Time: _____                                          Staff Signature:  ____________________________ Date: ________  Time: _____

## 2020-12-23 PROCEDURE — 250N000013 HC RX MED GY IP 250 OP 250 PS 637: Performed by: PSYCHIATRY & NEUROLOGY

## 2020-12-23 PROCEDURE — 99207 PR CDG-MDM COMPONENT: MEETS HIGH - UP CODED: CPT | Performed by: PSYCHIATRY & NEUROLOGY

## 2020-12-23 PROCEDURE — 124N000002 HC R&B MH UMMC

## 2020-12-23 PROCEDURE — 99233 SBSQ HOSP IP/OBS HIGH 50: CPT | Mod: GT | Performed by: PSYCHIATRY & NEUROLOGY

## 2020-12-23 RX ADMIN — TRAZODONE HYDROCHLORIDE 50 MG: 50 TABLET ORAL at 20:47

## 2020-12-23 RX ADMIN — VENLAFAXINE HYDROCHLORIDE 150 MG: 150 TABLET, EXTENDED RELEASE ORAL at 09:22

## 2020-12-23 RX ADMIN — LISINOPRIL 20 MG: 20 TABLET ORAL at 09:22

## 2020-12-23 RX ADMIN — HYDROXYZINE HYDROCHLORIDE 25 MG: 25 TABLET, FILM COATED ORAL at 19:33

## 2020-12-23 ASSESSMENT — ACTIVITIES OF DAILY LIVING (ADL)
LAUNDRY: WITH SUPERVISION
HYGIENE/GROOMING: INDEPENDENT
ORAL_HYGIENE: INDEPENDENT
HYGIENE/GROOMING: INDEPENDENT
DRESS: INDEPENDENT
DRESS: INDEPENDENT
ORAL_HYGIENE: INDEPENDENT
LAUNDRY: WITH SUPERVISION

## 2020-12-23 NOTE — PLAN OF CARE
Work Completed: WR prompted pt for his CD eval. Afterwards he wanted to discuss how his grandmother will be able to bring his belongings before he goes to treatment. WR suggested that pt waits until WR is able to coordinate a location with a date, at which time he can request his grandmother bring his belongings. Pt appeared anxious when initiating the discussion but appeared much relieved when WR was able to help him come up with a plan.     WR attended team and reviewed chart.     Discharge plan or goal: Pt had Rule 25 done today and will most likely go to residential treatment.                 Barriers to discharge: Due to the holiday week referrals will be put off since locations will be closed, WR will work on referrals on Monday. Pt was instructed by the doctor and the  that he will be doing a bed to bed transfer for his safety.

## 2020-12-23 NOTE — PROGRESS NOTES
Video Visit: Yes, the patient's condition can be safely assessed and treated via synchronous audio and visual telemedicine encounter.      Reason for Video Visit: Services only offered telehealth    Location of Originating Site: Patient's home    Distant Site: Provider Remote Setting     Video visit start time:  10:13 am  Video visit end time:  10:55 am  [address] [address]      ADULT CD ASSESSMENT ADDENDUM      Patient Name: Girma Trejo  Cell Phone:   Home: [phone] (home)    Mobile:   Telephone Information:   Mobile [phone]     Email:  [email or web address]  Emergency Contact: Meagan Guerrero (grandmother)      Tel: [phone]    The patient reported being:  being single, with no serious involvement    With which race do you identify? /White male of Polish and Haitian decent           Initial Screening Questions     1. Are you currently having severe withdrawal symptoms that are putting yourself or others in danger?  No    2. Are you currently having severe medical problems that require immediate attention?  No    3. Are you currently having severe emotional or behavioral problems that are putting yourself or others at risk of harm?  No    4. Do you currently participate in community filiberto activities, such as attending Jewish, temple, Adventist or Hoahaoism services?  No    5. How does your spirituality impact your recovery?  The patient denied having any spiritual beliefs at this time.    6. Do you currently self-administer your medications?  Yes    Have you ever purged, binged or restricted yourself as a way to control your weight?   No     Are you on a special diet?   No     Do you have any concerns regarding your nutritional status?   No     Have you had any appetite changes in the last 3 months?   No   Have you had weight loss or weight gain of more than 10 lbs in the last 3 months?   If patient gained or lost more than 10 lbs, then refer  to program RN / attending Physician for assessment.   No   Was the patient informed of BMI?   Remote visit, unable to assess     Have you engaged in any risk-taking behavior that would put you at risk for exposure to blood-borne or sexually transmitted diseases?   Yes, explain: The patient has a history of IV drug use on a handful of occasions with heroin and methamphetamine.     Do you have any dental problems?   Yes, Patient referred to go to their dentist.      Do you have a valid 's license?    No, explain: Patient has not had a DL since 2012.       Mental Health Status   Unable to assess the patient's mental health status on 12/23/2020 due to the substance abuse assessment being conducted over the phone or video secondary to the COVID-19 virus pandemic.       Criteria for Diagnosis: DSM-5 Criteria for Substance Use Disorders      Amphetamine Use Disorder Severe - 304.40 (F15.20)  Cannabis Use Disorder Moderate - 304.30 (F12.20)  Tobacco Use Disorder Moderate - 305.10 (F17.200)  BRENT, per patient self-report  MDD, per patient self-report  OCD, per patient self-report  PTSD, per patient self-report    Level of Care   I.) Intoxication and Withdrawal: 0   II.) Biomedical:  1   III.) Emotional and Behavioral:  2   IV.) Readiness to Change:  2   V.) Relapse Potential: 4   VI.) Recovery Environmental: 4     Initial Problem List     The patient is currently homeless  The patient lacks relapse prevention skills  The patient has poor coping skills  The patient has poor refusal skills   The patient lacks a sober peer support network  The patient has dual issues of MI and CD  The patient lacks the ability to effectively manage his/her mental health issues  The patient has a significant history of trauma and/or abuse issues    Patient/Client is willing to follow treatment recommendations.  Yes    Counselor: ASAF Mai

## 2020-12-23 NOTE — PROGRESS NOTES
"[facility], [place]   Psychiatric Progress Note      Video-Visit Details    Type of service:  Video Visit    Video Start Time (time video started): 1055    Video End Time (time video stopped): 1102    Originating Location (pt. Location): psychiatric inpatient    Distant Location (provider location): Provider remote location    Mode of Communication:  Video Conference via [employer]    Physician has received verbal consent for a Video Visit from the patient? Yes    Interim History:  The patient's care was discussed with the treatment team during the daily team meeting and/or staff's chart notes were reviewed.  Overnight staff report spent the evening sleeping in his room. Pt ate dinner after it was brought to his room and stated he just needs to rest. Pt stated \"tomorrow I'm going to try and get up and participate in activities, I just feel like I need to rest tonight\". Pt endorses SI thoughts, but contracts for safety and feels safe at the hospital. Pt states he would kill himself if he were not in the hospital. Pt denies SIB, but endorses severe depression. Pt presents with a blunted affect, but was pleasant upon approach and cooperative. No other concerns noted.  Slept at least 7 hours overnight.    Girma states that he finally feels a bit recovered from his \"lumbar\", was going to start working on discharge disposition today.  States that he did cooperate with the CD assessment, thinks that direct transfer to residential CD treatment will be recommended.  He is agreeable with this plan.  He also understands that he needs to spend a bit more time in recovery and sober living to minimize chances for relapse.  States that he is not suicidal, however if he relapses, he \"cannot assure anything\".  He tolerated the increase of Effexor 150 mg, discussed that this will probably be increased further to his preadmission dose before discharge.      Physical ROS:  The patient endorsed no side effects " "from medications except as above.. The remainder of 10-point review of systems was negative except as noted in Interim History.    Patient Active Problem List   Diagnosis     Smoker     Psoriasis of scalp     Chronic chest pain     Alcoholism (H)     Marijuana smoker     OCD (obsessive compulsive disorder)     Anxiety     Hypertension, goal below 140/90     Major depression in remission (H)     Morbid obesity (H)     Depression     Falls     Chest pain     Finger injury     Cellulitis     Skin infection     Major depressive disorder, recurrent episode, moderate (H)     Suicidal ideation     Polysubstance abuse (H)       Medications:    lisinopril  20 mg Oral Daily     venlafaxine  150 mg Oral Daily      Allergies:  Allergies   Allergen Reactions     Hydrocodone Rash     No problems with oxycodone and hydromorphone.     Pregabalin      Foot swelling     No Clinical Screening - See Comments      Other reaction(s): Runny Nose     Vilazodone Other (See Comments)     Blurred vision, racing heart      Labs:  No results found for this or any previous visit (from the past 24 hour(s)).    Psychiatric Examination:    /75   Pulse 93   Temp 98  F (36.7  C) (Oral)   Resp 16   Ht 1.905 m (6' 3\")   Wt 137.1 kg (302 lb 4.8 oz)   SpO2 97%   BMI 37.78 kg/m    Weight is 302 lbs 4.8 oz  Body mass index is 37.78 kg/m .  Orthostatic Vitals       Most Recent      Lying Orthostatic /75 12/23 0923    Lying Orthostatic Pulse (bpm) 93 12/23 0923            Appearance: awake, alert  General behavior: Cooperative  Eye Contact: Good   Gait and Motor Coordination: Normal gait and motor coordination  Speech: normal rate, production, volume, and rhythm of  Language: Normal  Attention/Concentration: Fair  Orientation: oriented to time, place and person  Thought Process:  linear  Thought Content: Denies suicidal ideation, however states that if he relapses, he \"cannot assure anything\".  Recent and Remote Memory: no impairment in " immediate, recent, or remote memory  Associations: no loose associations  Mood: Neutral  Affect: Constricted  Fund of knowledge: appropriate to education and experiences  Demonstration of insight/judgment: Good  Suicidal risk: Low      Clinical Global Impressions  First:     Most recent:       Precautions:  Behavioral Orders   Procedures     Code 1 - Restrict to Unit     Discontinue 1:1 attendant for suicide risk     Order Specific Question:   I have performed an in person assessment of the patient     Answer:   Based on this assessment the patient no longer requires a one on one attendant at this point in time.     Routine Programming     As clinically indicated     Status 15     Every 15 minutes.     Suicide precautions     Patients on Suicide Precautions should have a Combination Diet ordered that includes a Diet selection(s) AND a Behavioral Tray selection for Safe Tray - with utensils, or Safe Tray - NO utensils         Diagnoses:  Mood disorder, substance-induced  Amphetamine dependence  Major depressive disorder by history  Anxiety, NOS     ASSESSMENT/PLAN:     40-year-old male with longstanding history of polysubstance abuse, mostly methamphetamines.  Difficult to tell if he has a primary mood disorder given that he is longus sobriety is reportedly only a few months.  Had multiple treatments, and likely good medication compliance, so it is difficult to tell whether he has had adequate medication trials.  Once the end cycle of readmissions, states desire to go back to treatment with appropriate aftercare outside of [facility] to minimize chances for relapse.     1. Continue admission for safety and stabilization  2. We will continue with Effexor  150 mg daily.  3. CD consult in progress.  Patient understands that he will benefit from direct transfer to treatment facility, has good insight about his risk for relapse  4. Anticipate 5 to 7 days        Reason for ongoing admission: poses an imminent risk to  self  Discharge location: Chemical dependency treatment facility  Discharge Medications: not ordered  Follow-up Appointments: not scheduled  Legal Status: voluntary    Entered by: Luisito Parks MD on 12/23/2020 at 10:56 AM

## 2020-12-23 NOTE — PROGRESS NOTES
" Girma spent the evening sleeping in his room. Pt ate dinner after it was brought to his room and stated he just needs to rest. Pt stated \"tomorrow I'm going to try and get up and participate in activities, I just feel like I need to rest tonight\". Pt endorses SI thoughts, but contracts for safety and feels safe at the hospital. Pt states he would kill himself if he were not in the hospital. Pt denies SIB, but endorses severe depression. Pt presents with a blunted affect, but was pleasant upon approach and cooperative. No other concerns noted.     12/22/20 2142   Psycho Education   Type of Intervention 1:1 intervention   Response participates, initiates socially appropriate   Hours 0.5   Treatment Detail Current MH Status   Behavioral Health   Hallucinations denies / not responding to hallucinations   Thinking intact   Orientation place: oriented;person: oriented;date: oriented;time: oriented   Memory baseline memory   Insight admits / accepts   Judgement impaired   Eye Contact at examiner   Affect blunted, flat   Mood mood is calm   Physical Appearance/Attire attire appropriate to age and situation   Hygiene neglected grooming - unclean body, hair, teeth   Suicidality thoughts only;safety plan   1. Wish to be Dead (Recent) Yes   2. Non-Specific Active Suicidal Thoughts (Recent) No   Self Injury other (see comment)  (Pt denies SIB)   Elopement   (No elopement concerns)   Activity isolative;withdrawn   Speech clear   Medication Sensitivity no stated side effects   Psychomotor / Gait steady   Daily Care   Activity (Behavioral Health) up ad jazmine   Patient Performed Hygiene dressed   Activities of Daily Living   Hygiene/Grooming independent   Oral Hygiene independent   Dress scrubs (behavioral health);independent   Laundry with supervision   Room Organization independent     "

## 2020-12-23 NOTE — PLAN OF CARE
"Patient has been visible in the milieu after speaking with his psychiatrist. Signed an DONNA for his grandmother. Patient denies suicidal ideation and self injurious thoughts. Denies auditory and visual hallucinations. Reports being depressed and anxious. Med compliant. Ate meals. Pleasant and cooperative on the unit.     Patient evaluation continues. Assessed mood,anxiety,thoughts and behavior.     Patient gradually progressing towards goals.    Patient is encouraged to participate in groups and assisted to develop healthy coping skills.     VS reviewed: /75   Pulse 93   Temp 98  F (36.7  C) (Oral)   Resp 16   Ht 1.905 m (6' 3\")   Wt 137.1 kg (302 lb 4.8 oz)   SpO2 97%   BMI 37.78 kg/m      Length of stay: 2    Refer to daily team meeting notes for individualized plan of care. Nursing will continue to assess.    "

## 2020-12-23 NOTE — CONSULTS
This patient was seen for a substance use disorder assessment by this counselor today on 12/23/2020 via telephone with video.  The following recommendations were made:    Recommendations:   1.)  It was recommended the patient continue with mental health stabilization while on [facility] IP mental health unit at [facility] in [place], [place].  2.)  Abstain from alcohol and from all other non-prescribed mood altering chemicals.   3.)  Follow all of the recommendations of his medical and mental health providers.  4.)  Enter a residential substance use disorder treatment program with mental health services such as [facility], [facility] or a similar residential or board and lodging treatment program for substance use disorder treatment.  5.)  Follow all of the recommendations of his substance use disorder treatment providers including entering an extended care program as needed.     This patient's 12/23/2020 DANNY assessment will be faxed to [facility] and to [facility] ([facility]) on 12/23/2020 as a referral for residential substance use disorder treatment program with mental health services.     Resources:   DANNY Resources: [facility] [place]   (https://[place]am[place].org), [facility], [address] [place] [address] / Phone: [phone] and [facility], [address], [address] â   [place] [address]  / Phone: [phone] ([email or web address] [email or web address])     DANNY consult completed by: ASAF Mai.  Phone Number: [phone]  E-mail Address: [email or web address]  [address] [place] [address]
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
## 2020-12-24 PROCEDURE — 250N000013 HC RX MED GY IP 250 OP 250 PS 637: Performed by: PSYCHIATRY & NEUROLOGY

## 2020-12-24 PROCEDURE — 99233 SBSQ HOSP IP/OBS HIGH 50: CPT | Mod: GT | Performed by: PSYCHIATRY & NEUROLOGY

## 2020-12-24 PROCEDURE — 124N000002 HC R&B MH UMMC

## 2020-12-24 PROCEDURE — 99207 PR CDG-MDM COMPONENT: MEETS HIGH - UP CODED: CPT | Performed by: PSYCHIATRY & NEUROLOGY

## 2020-12-24 RX ADMIN — VENLAFAXINE HYDROCHLORIDE 150 MG: 150 TABLET, EXTENDED RELEASE ORAL at 08:52

## 2020-12-24 RX ADMIN — LISINOPRIL 20 MG: 20 TABLET ORAL at 08:53

## 2020-12-24 RX ADMIN — HYDROXYZINE HYDROCHLORIDE 25 MG: 25 TABLET, FILM COATED ORAL at 16:55

## 2020-12-24 ASSESSMENT — ACTIVITIES OF DAILY LIVING (ADL)
DRESS: INDEPENDENT
HYGIENE/GROOMING: INDEPENDENT
DRESS: INDEPENDENT
LAUNDRY: WITH SUPERVISION
HYGIENE/GROOMING: INDEPENDENT
ORAL_HYGIENE: INDEPENDENT
ORAL_HYGIENE: INDEPENDENT

## 2020-12-24 NOTE — PROGRESS NOTES
"[facility], [place]   Psychiatric Progress Note      Video-Visit Details    Type of service:  Video Visit    Video Start Time (time video started): 1200    Video End Time (time video stopped): 1203    Originating Location (pt. Location): psychiatric inpatient    Distant Location (provider location): Provider remote location    Mode of Communication:  Video Conference via [employer]    Physician has received verbal consent for a Video Visit from the patient? Yes    Interim History:  The patient's care was discussed with the treatment team during the daily team meeting and/or staff's chart notes were reviewed.  Overnight staff report Girma spent the majority of the early evening visible in the milieu before laying down to sleep around 7pm. Pt had a brief moment of irritability when he was upset about possibly having belongings brought in, but calmed after clearing up the issue with a nurse. Pt was otherwise calm and presents with a flat affect, appearing slightly depressed. Pt denies SIB, but endorses some SI, but contracts for safety. Pt stated \"I can't say for sure\" when answering if he would harm himself if he were not in the hospital. Pt endorses depression as present, but not as severe as the last two days. Pt ate at mealtimes. Pt stated he feels that he may be going through withdrawals when complaining about the heat in his rooms. Pt plans on going to treatment. No other concerns noted. Sleep reported 7 hours.    Girma reports that he feels depressed today.  Discussed with him that depression will unlikely improve yet for some time regardless of the medication dose, therefore we will continue with 150 mg of Effexor Exar that he is tolerating.  He understands that he needs to accumulate a bit longer sobriety in order to have a chance of appropriate treatment of depressive illness and decrease the risk for relapse.  No other questions or concerns today.        Physical ROS:  The patient " "endorsed no side effects from medications except as above.. The remainder of 10-point review of systems was negative except as noted in Interim History.    Patient Active Problem List   Diagnosis     Smoker     Psoriasis of scalp     Chronic chest pain     Alcoholism (H)     Marijuana smoker     OCD (obsessive compulsive disorder)     Anxiety     Hypertension, goal below 140/90     Major depression in remission (H)     Morbid obesity (H)     Depression     Falls     Chest pain     Finger injury     Cellulitis     Skin infection     Major depressive disorder, recurrent episode, moderate (H)     Suicidal ideation     Polysubstance abuse (H)       Medications:    lisinopril  20 mg Oral Daily     venlafaxine  150 mg Oral Daily      Allergies:  Allergies   Allergen Reactions     Hydrocodone Rash     No problems with oxycodone and hydromorphone.     Pregabalin      Foot swelling     No Clinical Screening - See Comments      Other reaction(s): Runny Nose     Vilazodone Other (See Comments)     Blurred vision, racing heart      Labs:  No results found for this or any previous visit (from the past 24 hour(s)).    Psychiatric Examination:    /88   Pulse 101   Temp 94.4  F (34.7  C) (Oral)   Resp 16   Ht 1.905 m (6' 3\")   Wt 137.1 kg (302 lb 4.8 oz)   SpO2 96%   BMI 37.78 kg/m    Weight is 302 lbs 4.8 oz  Body mass index is 37.78 kg/m .  Orthostatic Vitals       Most Recent      Lying Orthostatic /75 12/23 0923    Lying Orthostatic Pulse (bpm) 93 12/23 0923            Appearance: awake, alert, in bed  General behavior: Cooperative  Eye Contact: Good   Gait and Motor Coordination: Normal gait and motor coordination  Speech: normal rate, production, volume, and rhythm of  Language: Normal  Attention/Concentration: Fair  Orientation: oriented to time, place and person  Thought Process:  linear  Thought Content: Contracts for safety, no evidence for psychosis  Recent and Remote Memory: no impairment in " immediate, recent, or remote memory  Associations: no loose associations  Mood: Dysphoric  Affect: Constricted  Fund of knowledge: appropriate to education and experiences  Demonstration of insight/judgment: Good  Suicidal risk: Low      Clinical Global Impressions  First:     Most recent:       Precautions:  Behavioral Orders   Procedures     Code 1 - Restrict to Unit     Discontinue 1:1 attendant for suicide risk     Order Specific Question:   I have performed an in person assessment of the patient     Answer:   Based on this assessment the patient no longer requires a one on one attendant at this point in time.     Routine Programming     As clinically indicated     Status 15     Every 15 minutes.     Suicide precautions     Patients on Suicide Precautions should have a Combination Diet ordered that includes a Diet selection(s) AND a Behavioral Tray selection for Safe Tray - with utensils, or Safe Tray - NO utensils         Diagnoses:  Mood disorder, substance-induced  Amphetamine dependence  Major depressive disorder by history  Anxiety, NOS     ASSESSMENT/PLAN:     40-year-old male with longstanding history of polysubstance abuse, mostly methamphetamines.  Difficult to tell if he has a primary mood disorder given that he is longus sobriety is reportedly only a few months.  Had multiple treatments, and likely good medication compliance, so it is difficult to tell whether he has had adequate medication trials.  Once the end cycle of readmissions, states desire to go back to treatment with appropriate aftercare outside of [facility] to minimize chances for relapse.     1. Continue admission for safety and stabilization  2. We will continue with Effexor  150 mg daily.  3. CD assessment recommends residential treatment with which patient agrees.  Referrals are deferred because of holiday weekend.  Patient would benefit from [id number] transfer to residential facility given high risk for  relapse.  4. Anticipate 5 to 7 days        Reason for ongoing admission: poses an imminent risk to self  Discharge location: Chemical dependency treatment facility  Discharge Medications: not ordered  Follow-up Appointments: not scheduled  Legal Status: voluntary    Entered by: Luisito Parks MD on 12/24/2020 at 12:51 PM

## 2020-12-24 NOTE — PROGRESS NOTES
Work Completed: Pt completed his R25, pending recommendations. Staff report pt presents with flat affect and more labile. Pt is concerned about getting his belongings. He denies SI    Discharge plan or goal: CD treatment                Barriers to discharge: waiting for recommendations from CD evaluator

## 2020-12-24 NOTE — PROGRESS NOTES
"Girma spent the majority of the early evening visible in the milieu before laying down to sleep around 7pm. Pt had a brief moment of irritability when he was upset about possibly having belongings brought in, but calmed after clearing up the issue with a nurse. Pt was otherwise calm and presents with a flat affect, appearing slightly depressed. Pt denies SIB, but endorses some SI, but contracts for safety. Pt stated \"I can't say for sure\" when answering if he would harm himself if he were not in the hospital. Pt endorses depression as present, but not as severe as the last two days. Pt ate at mealtimes. Pt stated he feels that he may be going through withdrawals when complaining about the heat in his rooms. Pt plans on going to treatment. No other concerns noted.     12/23/20 1949   Psycho Education   Type of Intervention 1:1 intervention   Response participates, initiates socially appropriate   Hours 0.5   Treatment Detail Current MH Status   Behavioral Health   Hallucinations denies / not responding to hallucinations   Thinking intact   Orientation person: oriented;place: oriented;date: oriented;time: oriented   Memory baseline memory   Insight admits / accepts   Judgement impaired   Eye Contact at examiner   Affect blunted, flat   Mood depressed;anxious   Physical Appearance/Attire untidy   Hygiene neglected grooming - unclean body, hair, teeth   Suicidality thoughts only;safety plan   1. Wish to be Dead (Recent) Yes   2. Non-Specific Active Suicidal Thoughts (Recent) No   Self Injury other (see comment)  (Pt denies SIB)   Elopement   (No elopement concerns)   Activity other (see comment)  (Minimal social interactions; visible)   Speech clear;coherent   Medication Sensitivity no stated side effects   Psychomotor / Gait steady   Daily Care   Activity (Behavioral Health) up ad jazmine   Patient Performed Hygiene dressed   Activities of Daily Living   Hygiene/Grooming independent   Oral Hygiene independent   Dress " independent   Laundry with supervision   Room Organization independent

## 2020-12-24 NOTE — PROGRESS NOTES
Patient has been in room most of the shift. He has eaten meals in his room. He stated that he is depressed 8 out of 10. He denies HI/SI and SIB/AVH. He stated that he is going to try and get up and out of bed around dinner and try to be active on the unit. He stated its hard to get up and going with feeling how depressed he is.      12/24/20 1244   Behavioral Health   Hallucinations denies / not responding to hallucinations   Thinking intact   Orientation person: oriented;place: oriented;date: oriented;time: oriented   Memory baseline memory   Insight insight appropriate to situation   Judgement intact   Eye Contact at examiner   Affect blunted, flat;sad;other (see comments)  (Stated depressed 8 out of 10)   Mood depressed   Physical Appearance/Attire attire appropriate to age and situation   Hygiene well groomed   1. Wish to be Dead (Recent) No   2. Non-Specific Active Suicidal Thoughts (Recent) No   Activities of Daily Living   Hygiene/Grooming independent   Oral Hygiene independent   Dress independent

## 2020-12-25 PROCEDURE — 124N000002 HC R&B MH UMMC

## 2020-12-25 PROCEDURE — 250N000013 HC RX MED GY IP 250 OP 250 PS 637: Performed by: PSYCHIATRY & NEUROLOGY

## 2020-12-25 RX ADMIN — HYDROXYZINE HYDROCHLORIDE 25 MG: 25 TABLET, FILM COATED ORAL at 21:58

## 2020-12-25 RX ADMIN — LISINOPRIL 20 MG: 20 TABLET ORAL at 08:45

## 2020-12-25 RX ADMIN — TRAZODONE HYDROCHLORIDE 50 MG: 50 TABLET ORAL at 21:58

## 2020-12-25 RX ADMIN — VENLAFAXINE HYDROCHLORIDE 150 MG: 150 TABLET, EXTENDED RELEASE ORAL at 08:45

## 2020-12-25 RX ADMIN — ACETAMINOPHEN 650 MG: 325 TABLET, FILM COATED ORAL at 22:46

## 2020-12-25 NOTE — PLAN OF CARE
Pt presents with a flat affect with a depressed mood.  Pt presents as being irritable, declined breakfast but woke up for lunch.  Pt was med complaint.  Pt has been isolative to his room the entire shift.  Pt denies SI/SIB.

## 2020-12-26 PROCEDURE — 124N000002 HC R&B MH UMMC

## 2020-12-26 PROCEDURE — 250N000013 HC RX MED GY IP 250 OP 250 PS 637: Performed by: PSYCHIATRY & NEUROLOGY

## 2020-12-26 RX ADMIN — TRAZODONE HYDROCHLORIDE 50 MG: 50 TABLET ORAL at 21:42

## 2020-12-26 RX ADMIN — VENLAFAXINE HYDROCHLORIDE 150 MG: 150 TABLET, EXTENDED RELEASE ORAL at 08:45

## 2020-12-26 RX ADMIN — TRAZODONE HYDROCHLORIDE 50 MG: 50 TABLET ORAL at 22:13

## 2020-12-26 RX ADMIN — LISINOPRIL 20 MG: 20 TABLET ORAL at 08:45

## 2020-12-26 ASSESSMENT — ACTIVITIES OF DAILY LIVING (ADL)
HYGIENE/GROOMING: HANDWASHING;INDEPENDENT
DRESS: STREET CLOTHES;SCRUBS (BEHAVIORAL HEALTH);INDEPENDENT
ORAL_HYGIENE: INDEPENDENT

## 2020-12-26 NOTE — PROGRESS NOTES
"Pt was visible in the milieu,he did not interact with peers yet was appropriate. He displayed a flat affect and denies SI SIB AH as well as all mental health symptoms. He stated,\" I really dont want to go to treatment again but it looks like I need to go.\" He displayed a flat and sad affect this shift.      12/25/20 2000   Behavioral Health   Hallucinations denies / not responding to hallucinations   Thinking poor concentration   Orientation person: oriented;place: oriented;date: oriented   Memory baseline memory   Insight poor   Judgement impaired   Affect blunted, flat   Mood mood is calm   Suicidality (WDL)   (WDL)   1. Wish to be Dead (Recent) No  (Denies)   2. Non-Specific Active Suicidal Thoughts (Recent) No     "

## 2020-12-26 NOTE — PLAN OF CARE
Girma was visible in the milieu.  Affect is full range.  Pt denies auditory and visual hallucination.  Pt reports good appetite and sleep.  Pt has been complaint with prescribed medication and denies medication side effects.  Pt denies suicidal ideation.  Will continue to assess.

## 2020-12-27 PROCEDURE — 124N000002 HC R&B MH UMMC

## 2020-12-27 PROCEDURE — 250N000013 HC RX MED GY IP 250 OP 250 PS 637: Performed by: PSYCHIATRY & NEUROLOGY

## 2020-12-27 RX ADMIN — HYDROXYZINE HYDROCHLORIDE 25 MG: 25 TABLET, FILM COATED ORAL at 21:48

## 2020-12-27 RX ADMIN — LISINOPRIL 20 MG: 20 TABLET ORAL at 08:51

## 2020-12-27 RX ADMIN — TRAZODONE HYDROCHLORIDE 50 MG: 50 TABLET ORAL at 21:48

## 2020-12-27 RX ADMIN — ACETAMINOPHEN 650 MG: 325 TABLET, FILM COATED ORAL at 20:24

## 2020-12-27 RX ADMIN — VENLAFAXINE HYDROCHLORIDE 150 MG: 150 TABLET, EXTENDED RELEASE ORAL at 08:51

## 2020-12-27 ASSESSMENT — MIFFLIN-ST. JEOR: SCORE: 2374.1

## 2020-12-27 NOTE — PROGRESS NOTES
Girma was initially resting/napping in his room prior to eating dinner, afterwards he was observed sitting in the lounge watching television but kept to himself.  He appears blunted to flat in affect upon approach.  During 1:1 conversation he states he's waiting to hear back from the Rule 25  and hopes for MICD Tx. He stated he would prefer to go to the [facility] MICD tx in [place] since he's been there before and liked their program.  He reports ongoing depression, feeling somewhat hopeless, but denies SI and SIB urges/thoughts.      Addendum: Pt reported that he's experiencing some numbness in the tips of his middle and ring fingers in his right hand.  He states he used to have numbness in the fingers of his left hand, but has now switched to his right hand.     12/26/20 2007   Sleep/Rest/Relaxation   Day/Evening Time Hours napping;resting in bed   Number of hours napping 1 hours   Number of hours resting in bed 1 hours   Coping/Psychosocial   Verbalized Emotional State acceptance;depression   Plan of Care Reviewed With patient   Patient Agreement with Plan of Care agrees with comment (describe)  (Hoping for [facility] MICD tx in [place].)   Trust Relationship/Rapport care explained;choices provided;empathic listening provided;questions answered;questions encouraged;reassurance provided;thoughts/feelings acknowledged   Coping/Psychosocial Interventions   Supportive Measures active listening utilized;counseling provided;goal-setting facilitated;problem-solving facilitated;relaxation techniques promoted;self-reflection promoted;self-responsibility promoted;verbalization of feelings encouraged   Psycho Education   Type of Intervention 1:1 intervention   Response participates, initiates socially appropriate   Hours 0.5   Treatment Detail Current MH Status.   Behavioral Health   Hallucinations denies / not responding to hallucinations   Thinking poor concentration   Orientation person:  oriented;place: oriented   Memory baseline memory   Insight poor   Judgement impaired   Eye Contact at examiner   Affect blunted, flat   Mood depressed;mood is calm   Physical Appearance/Attire appears stated age   Hygiene well groomed   Suicidality other (see comments)  (Currently denies active SI.)   1. Wish to be Dead (Recent) No   2. Non-Specific Active Suicidal Thoughts (Recent) No   Self Injury other (see comment)  (Currently denies SIB urges/thoughts. )   Elopement   (No concernable statements or behaviors observed. )   Activity isolative;other (see comment)  (In the commons, but keeps to self. )   Speech clear;coherent   Medication Sensitivity no stated side effects;no observed side effects   Psychomotor / Gait balanced;steady   Activities of Daily Living   Hygiene/Grooming handwashing;independent   Oral Hygiene independent   Dress street clothes;scrubs (behavioral health);independent   Room Organization independent   Groups   Details Art Therapy  (Did not attend group.)

## 2020-12-27 NOTE — PROGRESS NOTES
12/27/20 1400   Behavioral Health   Hallucinations denies / not responding to hallucinations   Thinking intact   Orientation person: oriented;place: oriented;date: oriented;time: oriented   Insight other (see comment)  (Pt. states he's just eager to go to treatment. )   Eye Contact at examiner   Affect blunted, flat   Mood other (see comments)  (States he's just alittle bit depressed.)   Physical Appearance/Attire attire appropriate to age and situation   Hygiene other (see comment)  (Adequatly groomed.)   Suicidality other (see comments)  (Denies.)   1. Wish to be Dead (Recent) No   2. Non-Specific Active Suicidal Thoughts (Recent) No   Activity isolative;withdrawn   Speech clear;coherent   Medication Sensitivity no stated side effects;no observed side effects   Psychomotor / Gait balanced;steady

## 2020-12-28 PROCEDURE — 99233 SBSQ HOSP IP/OBS HIGH 50: CPT | Mod: GT | Performed by: PSYCHIATRY & NEUROLOGY

## 2020-12-28 PROCEDURE — 124N000002 HC R&B MH UMMC

## 2020-12-28 PROCEDURE — 250N000013 HC RX MED GY IP 250 OP 250 PS 637: Performed by: PSYCHIATRY & NEUROLOGY

## 2020-12-28 PROCEDURE — 99207 PR CDG-MDM COMPONENT: MEETS HIGH - UP CODED: CPT | Performed by: PSYCHIATRY & NEUROLOGY

## 2020-12-28 RX ADMIN — TRAZODONE HYDROCHLORIDE 50 MG: 50 TABLET ORAL at 22:20

## 2020-12-28 RX ADMIN — HYDROXYZINE HYDROCHLORIDE 25 MG: 25 TABLET, FILM COATED ORAL at 14:06

## 2020-12-28 RX ADMIN — ACETAMINOPHEN 650 MG: 325 TABLET, FILM COATED ORAL at 23:15

## 2020-12-28 RX ADMIN — HYDROXYZINE HYDROCHLORIDE 25 MG: 25 TABLET, FILM COATED ORAL at 22:20

## 2020-12-28 RX ADMIN — LISINOPRIL 20 MG: 20 TABLET ORAL at 08:33

## 2020-12-28 RX ADMIN — VENLAFAXINE HYDROCHLORIDE 150 MG: 150 TABLET, EXTENDED RELEASE ORAL at 08:33

## 2020-12-28 ASSESSMENT — ACTIVITIES OF DAILY LIVING (ADL)
LAUNDRY: WITH SUPERVISION
DRESS: INDEPENDENT
HYGIENE/GROOMING: INDEPENDENT
ORAL_HYGIENE: INDEPENDENT

## 2020-12-28 NOTE — PLAN OF CARE
Pt presents with a flat blunted affect but brightens upon approach.  Assessed mood, anxiety, thoughts, and behavior. Pt denies auditory and visual hallucination.  Pt endorses some ongoing depression due to being in the hospital.  Pt hopes to get into MICD treatment soon.  Pt is socially withdrawn, minimal interaction with others.  No behavioral concerns.  Pt has been calm and co-operative.  Will continue to monitor.

## 2020-12-28 NOTE — PROGRESS NOTES
"  Patient maintained a low-profile on the unit through the AM, spending most of that time in bed sleeping and resting.  Since lunchtime, has been more visible in the lounge areas.  Observed program engagement so far has been nil.  Peer interactions likewise limited, but non-conflictual.   Generally quiet and withdrawn on approach but alert, pleasant, cooperative with necessary care interventions and unit protocols, and responsive to staff inquiries, feedback, and requests.  Mood has appeared generally relaxed.  Affective range full-range and congruent with mood.  Observed energy level low, appetite fair to good, and hygiene adequate.  Verbalized thoughts impressed writer as organized and free of delusional content or overt psychotic distortions.  Patient reported anxiety and depression levels as \"moderate\" at this time and denied SI/SIB/HI thoughts/urges or AV/VH's.  Did endorse some difficulty with his short-term memory and concentration at this time.  Has presented no safety-related behavioral challenges today.  Currently working with the treatment team to establish [facility] somewhere near [place], [place] where he lives.  Reports feeling more hopeful now of a more positive future than when he arrived on the unit.   Jake Dominique   12/28/2020  "

## 2020-12-28 NOTE — PROGRESS NOTES
[facility], [place]   Psychiatric Progress Note      Video-Visit Details    Type of service:  Video Visit    Video Start Time (time video started): 1142    Video End Time (time video stopped): 1145    Originating Location (pt. Location): psychiatric inpatient    Distant Location (provider location): Provider remote location    Mode of Communication:  Video Conference via [employer]    Physician has received verbal consent for a Video Visit from the patient? Yes    Interim History:  The patient's care was discussed with the treatment team during the daily team meeting and/or staff's chart notes were reviewed.  Overnight staff report Pt presents with a flat blunted affect but brightens upon approach.  Assessed mood, anxiety, thoughts, and behavior. Pt denies auditory and visual hallucination.  Pt endorses some ongoing depression due to being in the hospital.  Pt hopes to get into MICD treatment soon.  Pt is socially withdrawn, minimal interaction with others.  No behavioral concerns.  Pt has been calm and co-operative. Sleep reported 7 hours.    Girma reports that he is depressed, however is hopeful that he will be able to go to treatment soon.  No questions or concerns otherwise.        Physical ROS:  The patient endorsed no side effects from medications except as above.. The remainder of 10-point review of systems was negative except as noted in Interim History.    Patient Active Problem List   Diagnosis     Smoker     Psoriasis of scalp     Chronic chest pain     Alcoholism (H)     Marijuana smoker     OCD (obsessive compulsive disorder)     Anxiety     Hypertension, goal below 140/90     Major depression in remission (H)     Morbid obesity (H)     Depression     Falls     Chest pain     Finger injury     Cellulitis     Skin infection     Major depressive disorder, recurrent episode, moderate (H)     Suicidal ideation     Polysubstance abuse (H)       Medications:    lisinopril  20 mg  "Oral Daily     venlafaxine  150 mg Oral Daily      Allergies:  Allergies   Allergen Reactions     Hydrocodone Rash     No problems with oxycodone and hydromorphone.     Pregabalin      Foot swelling     No Clinical Screening - See Comments      Other reaction(s): Runny Nose     Vilazodone Other (See Comments)     Blurred vision, racing heart      Labs:  No results found for this or any previous visit (from the past 24 hour(s)).    Psychiatric Examination:    /65   Pulse 102   Temp 97.5  F (36.4  C)   Resp 16   Ht 1.905 m (6' 3\")   Wt 138.3 kg (305 lb)   SpO2 96%   BMI 38.12 kg/m    Weight is 305 lbs 0 oz  Body mass index is 38.12 kg/m .  Orthostatic Vitals       Most Recent      Lying Orthostatic /75 12/23 0923    Lying Orthostatic Pulse (bpm) 93 12/23 0923            Appearance: awake, alert, in bed  General behavior: Cooperative  Eye Contact: Good   Gait and Motor Coordination: Normal gait and motor coordination  Speech: normal rate, production, volume, and rhythm of  Language: Normal  Attention/Concentration: Fair  Orientation: oriented to time, place and person  Thought Process:  linear  Thought Content: Contracts for safety, no evidence for psychosis  Recent and Remote Memory: no impairment in immediate, recent, or remote memory  Associations: no loose associations  Mood: Dysphoric  Affect: Constricted  Fund of knowledge: appropriate to education and experiences  Demonstration of insight/judgment: Good  Suicidal risk: Low      Clinical Global Impressions  First:     Most recent:       Precautions:  Behavioral Orders   Procedures     Code 1 - Restrict to Unit     Discontinue 1:1 attendant for suicide risk     Order Specific Question:   I have performed an in person assessment of the patient     Answer:   Based on this assessment the patient no longer requires a one on one attendant at this point in time.     Routine Programming     As clinically indicated     Status 15     Every 15 minutes. "     Suicide precautions     Patients on Suicide Precautions should have a Combination Diet ordered that includes a Diet selection(s) AND a Behavioral Tray selection for Safe Tray - with utensils, or Safe Tray - NO utensils         Diagnoses:  Mood disorder, substance-induced  Amphetamine dependence  Major depressive disorder by history  Anxiety, NOS     ASSESSMENT/PLAN:     40-year-old male with longstanding history of polysubstance abuse, mostly methamphetamines.  Difficult to tell if he has a primary mood disorder given that he is longus sobriety is reportedly only a few months.  Had multiple treatments, and likely good medication compliance, so it is difficult to tell whether he has had adequate medication trials.  Once the end cycle of readmissions, states desire to go back to treatment with appropriate aftercare outside of [facility] to minimize chances for relapse.     1. Continue admission for safety and stabilization  2. We will continue with Effexor  150 mg daily.  3. CD assessment recommends residential treatment with which patient agrees.  Referrals are deferred because of holiday weekend.  Patient would benefit from [id number] transfer to residential facility given high risk for relapse.  4. Anticipate discharge to CD treatment when available        Reason for ongoing admission: poses an imminent risk to self  Discharge location: Chemical dependency treatment facility  Discharge Medications: not ordered  Follow-up Appointments: not scheduled  Legal Status: voluntary    Entered by: Luisito Parks MD on 12/28/2020 at 11:46 AM

## 2020-12-28 NOTE — PLAN OF CARE
Work Completed: FLORENTINO [facility]. He sent referral to [facility] and [facility] but needed the fax for [facility]. WR was able to get the fax and give it to Ed.     WR spoke with pt per his request. He wanted updates on his referral since he is anxious to get to treatment. His first choice is [facility] however he is willing to consider other options if [facility] has a long wait list.     WR spoke to intake at [facility]. Pt was recently in their program and they have a policy of not re-admitting patients so close to discharge unless they complete another treatment program first. They will take a look at the referral but told WR not to plan on an admission.    WR sent referral/Rule 25 to [facility] per pt's request as he wanted to make sure he has more options, and he had a good experience previously.      FLORENTINO sent progress notes and H&P to [facility] F: [phone].     Received a call from [facility] and they are willing to accept pt for an immediate opening.     Discharge plan or goal: Pt is scheduled to discharge to [facility] on Wednesday. They will be picking up pt at 11:00AM and will call the unit once they arrive.                 Barriers to discharge: Admission is scheduled for Wednesday, pt cannot discharge sooner than that due to bed availability and transportation.

## 2020-12-29 PROCEDURE — 124N000002 HC R&B MH UMMC

## 2020-12-29 PROCEDURE — 250N000013 HC RX MED GY IP 250 OP 250 PS 637: Performed by: PSYCHIATRY & NEUROLOGY

## 2020-12-29 PROCEDURE — 99233 SBSQ HOSP IP/OBS HIGH 50: CPT | Mod: GT | Performed by: PSYCHIATRY & NEUROLOGY

## 2020-12-29 PROCEDURE — 99207 PR CDG-MDM COMPONENT: MEETS HIGH - UP CODED: CPT | Performed by: PSYCHIATRY & NEUROLOGY

## 2020-12-29 RX ORDER — TRAZODONE HYDROCHLORIDE 50 MG/1
50 TABLET, FILM COATED ORAL
Qty: 30 TABLET | Refills: 0 | Status: SHIPPED | OUTPATIENT
Start: 2020-12-29 | End: 2023-02-27

## 2020-12-29 RX ORDER — HYDROXYZINE HYDROCHLORIDE 25 MG/1
25 TABLET, FILM COATED ORAL EVERY 4 HOURS PRN
Qty: 30 TABLET | Refills: 0 | Status: SHIPPED | OUTPATIENT
Start: 2020-12-29

## 2020-12-29 RX ORDER — LISINOPRIL 20 MG/1
20 TABLET ORAL DAILY
Qty: 30 TABLET | Refills: 0 | Status: SHIPPED | OUTPATIENT
Start: 2020-12-29 | End: 2023-02-27

## 2020-12-29 RX ORDER — VENLAFAXINE HYDROCHLORIDE 225 MG/1
225 TABLET, EXTENDED RELEASE ORAL DAILY
Qty: 30 TABLET | Refills: 0 | Status: SHIPPED | OUTPATIENT
Start: 2020-12-29

## 2020-12-29 RX ADMIN — TRAZODONE HYDROCHLORIDE 50 MG: 50 TABLET ORAL at 00:46

## 2020-12-29 RX ADMIN — VENLAFAXINE HYDROCHLORIDE 150 MG: 150 TABLET, EXTENDED RELEASE ORAL at 08:32

## 2020-12-29 RX ADMIN — LISINOPRIL 20 MG: 20 TABLET ORAL at 08:32

## 2020-12-29 ASSESSMENT — ACTIVITIES OF DAILY LIVING (ADL)
DRESS: SCRUBS (BEHAVIORAL HEALTH)
ORAL_HYGIENE: INDEPENDENT
ORAL_HYGIENE: INDEPENDENT
LAUNDRY: WITH SUPERVISION
DRESS: INDEPENDENT
HYGIENE/GROOMING: INDEPENDENT;HANDWASHING
HYGIENE/GROOMING: INDEPENDENT
LAUNDRY: WITH SUPERVISION

## 2020-12-29 ASSESSMENT — MIFFLIN-ST. JEOR: SCORE: 2391.34

## 2020-12-29 NOTE — PROGRESS NOTES
Pt had difficulty falling asleep.  Received prn MR trazodone at 0046 and eventually appeared asleep at 0245.  Appears to have slept 4 hrs this shift.  Will continue to monitor and support plan of care.

## 2020-12-29 NOTE — PROGRESS NOTES
"[facility], [place]   Psychiatric Progress Note      Video-Visit Details    Type of service:  Video Visit    Video Start Time (time video started): 1135    Video End Time (time video stopped): 1141    Originating Location (pt. Location): psychiatric inpatient    Distant Location (provider location): Provider remote location    Mode of Communication:  Video Conference via Polycom    Physician has received verbal consent for a Video Visit from the patient? Yes    Interim History:  The patient's care was discussed with the treatment team during the daily team meeting and/or staff's chart notes were reviewed.  Overnight staff report Patient maintained a low-profile on the unit through the AM, spending most of that time in bed sleeping and resting.  Since lunchtime, has been more visible in the lounge areas.  Observed program engagement so far has been nil.  Peer interactions likewise limited, but non-conflictual.   Generally quiet and withdrawn on approach but alert, pleasant, cooperative with necessary care interventions and unit protocols, and responsive to staff inquiries, feedback, and requests.  Mood has appeared generally relaxed.  Affective range full-range and congruent with mood.  Observed energy level low, appetite fair to good, and hygiene adequate.  Verbalized thoughts impressed writer as organized and free of delusional content or overt psychotic distortions.  Patient reported anxiety and depression levels as \"moderate\" at this time and denied SI/SIB/HI thoughts/urges or AV/VH's.  Did endorse some difficulty with his short-term memory and concentration at this time.  Has presented no safety-related behavioral challenges today.  Currently working with the treatment team to establish aftercare programming at an [facility] facility somewhere near [place], [place] where he lives.  Reports feeling more hopeful now of a more positive future than when he arrived on the unit.      Patient " "reports improvement of depression.  He is looking forward to going to treatment.  Discussed increasing Effexor back to his preadmission dose of 225 mg.  Anticipated discharge tomorrow to CD treatment    Physical ROS:  The patient endorsed no side effects from medications except as above.. The remainder of 10-point review of systems was negative except as noted in Interim History.    Patient Active Problem List   Diagnosis     Smoker     Psoriasis of scalp     Chronic chest pain     Alcoholism (H)     Marijuana smoker     OCD (obsessive compulsive disorder)     Anxiety     Hypertension, goal below 140/90     Major depression in remission (H)     Morbid obesity (H)     Depression     Falls     Chest pain     Finger injury     Cellulitis     Skin infection     Major depressive disorder, recurrent episode, moderate (H)     Suicidal ideation     Polysubstance abuse (H)       Medications:    lisinopril  20 mg Oral Daily     venlafaxine  150 mg Oral Daily      Allergies:  Allergies   Allergen Reactions     Hydrocodone Rash     No problems with oxycodone and hydromorphone.     Pregabalin      Foot swelling     No Clinical Screening - See Comments      Other reaction(s): Runny Nose     Vilazodone Other (See Comments)     Blurred vision, racing heart      Labs:  No results found for this or any previous visit (from the past 24 hour(s)).    Psychiatric Examination:    /77   Pulse 78   Temp 97.1  F (36.2  C) (Oral)   Resp 16   Ht 1.905 m (6' 3\")   Wt 140.1 kg (308 lb 12.8 oz)   SpO2 98%   BMI 38.60 kg/m    Weight is 308 lbs 12.8 oz  Body mass index is 38.6 kg/m .  Orthostatic Vitals       Most Recent      Lying Orthostatic /75 12/23 0923    Lying Orthostatic Pulse (bpm) 93 12/23 0923            Appearance: awake, alert, in bed  General behavior: Cooperative  Eye Contact: Good   Gait and Motor Coordination: Normal gait and motor coordination  Speech: normal rate, production, volume, and rhythm of  Language: " Normal  Attention/Concentration: Fair  Orientation: oriented to time, place and person  Thought Process:  linear  Thought Content: Contracts for safety, no evidence for psychosis  Recent and Remote Memory: no impairment in immediate, recent, or remote memory  Associations: no loose associations  Mood: Dysphoric  Affect: Constricted  Fund of knowledge: appropriate to education and experiences  Demonstration of insight/judgment: Good  Suicidal risk: Low      Clinical Global Impressions  First:     Most recent:       Precautions:  Behavioral Orders   Procedures     Code 1 - Restrict to Unit     Discontinue 1:1 attendant for suicide risk     Order Specific Question:   I have performed an in person assessment of the patient     Answer:   Based on this assessment the patient no longer requires a one on one attendant at this point in time.     Routine Programming     As clinically indicated     Status 15     Every 15 minutes.     Suicide precautions     Patients on Suicide Precautions should have a Combination Diet ordered that includes a Diet selection(s) AND a Behavioral Tray selection for Safe Tray - with utensils, or Safe Tray - NO utensils         Diagnoses:  Mood disorder, substance-induced  Amphetamine dependence  Major depressive disorder by history  Anxiety, NOS     ASSESSMENT/PLAN:     40-year-old male with longstanding history of polysubstance abuse, mostly methamphetamines.  Difficult to tell if he has a primary mood disorder given that he is longus sobriety is reportedly only a few months.  Had multiple treatments, and likely good medication compliance, so it is difficult to tell whether he has had adequate medication trials.  Once the end cycle of readmissions, states desire to go back to treatment with appropriate aftercare outside of [facility] to minimize chances for relapse.     1. Continue admission for safety and stabilization  2. We will increase  Effexor to 225 mg daily.  3. CD assessment  recommends residential treatment with which patient agrees.  Referrals are deferred because of holiday weekend.  Patient would benefit from [id number] transfer to residential facility given high risk for relapse.  4. Anticipate discharge to CD treatment tomorrow        Reason for ongoing admission: poses an imminent risk to self  Discharge location: Chemical dependency treatment facility  Discharge Medications: ordered  Follow-up Appointments: scheduled  Legal Status: voluntary    Entered by: Luisito Parks MD on 12/29/2020 at 12:40 PM

## 2020-12-29 NOTE — PLAN OF CARE
Work Completed: WR attended team and reviewed chart.    WR completed AVS.      Discharge plan or goal: Pt to discharge to [facility]. They will be picking pt up at 11:00.                 Barriers to discharge: Pt's admission was arranged for tomorrow, bed not available until then.

## 2020-12-29 NOTE — PLAN OF CARE
Pt presents with full range affect and anxious mood. Denies SI/SIB and hallucinations. Rates anxiety 3/10 and denies depression. Reported that last night he was having some very vivid dreams and nightmares. Denies ever taking any medication for this and reports that he does not want to add anything else to his medication regimen. Pt came out for breakfast and was isolative to self. After breakfast he returned to his room to rest. He did not attend any groups. VSS. Medication compliant. No other concerns or complaints noted.

## 2020-12-29 NOTE — PLAN OF CARE
"Patient has spent parts of the shift sleeping and napping and other times in the lounge watching television and socializing with peers. Showered and shaved. No stated SI or SIB. Continues to have anxiety. Affect appears brighter. Pleasant and cooperative on unit.     Patient evaluation continues. Assessed mood,anxiety,thoughts and behavior.     Patient gradually progressing towards goals.    Patient is encouraged to participate in groups and assisted to develop healthy coping skills.     VS reviewed: /62   Pulse 104   Temp 98  F (36.7  C) (Oral)   Resp 16   Ht 1.905 m (6' 3\")   Wt 138.3 kg (305 lb)   SpO2 97%   BMI 38.12 kg/m      Length of stay: 8    Refer to daily team meeting notes for individualized plan of care. Nursing will continue to assess.    "

## 2020-12-30 VITALS
OXYGEN SATURATION: 96 % | TEMPERATURE: 98 F | WEIGHT: 308.8 LBS | SYSTOLIC BLOOD PRESSURE: 119 MMHG | BODY MASS INDEX: 38.4 KG/M2 | HEART RATE: 78 BPM | RESPIRATION RATE: 16 BRPM | HEIGHT: 75 IN | DIASTOLIC BLOOD PRESSURE: 77 MMHG

## 2020-12-30 PROCEDURE — 99238 HOSP IP/OBS DSCHRG MGMT 30/<: CPT | Mod: GT | Performed by: PSYCHIATRY & NEUROLOGY

## 2020-12-30 PROCEDURE — 250N000013 HC RX MED GY IP 250 OP 250 PS 637: Performed by: PSYCHIATRY & NEUROLOGY

## 2020-12-30 RX ADMIN — VENLAFAXINE HYDROCHLORIDE 150 MG: 150 TABLET, EXTENDED RELEASE ORAL at 08:23

## 2020-12-30 RX ADMIN — LISINOPRIL 20 MG: 20 TABLET ORAL at 08:23

## 2020-12-30 NOTE — PLAN OF CARE
Plan is to discharge today to [facility]. He states feeling ready, he denies feeling suicidal denies any safety concerns.He will be discharge with 30 day supply of medication, and a after care summary in place.

## 2020-12-30 NOTE — DISCHARGE SUMMARY
Psychiatric Discharge Summary    Girma Trejo MRN# [id number]   Age: 40 year old YOB: 1980     Date of Admission:  12/21/2020  Date of Discharge:  12/30/2020  Admitting Physician:  Luisito Parks MD  Discharge Physician:  Luisito Parks MD         See Admission note by Luisito Parks MD for additional details.     Attestation:    Video-Visit Details    Type of service:  Video Visit    Video Start Time (time video started): 1008    Video End Time (time video stopped): 1010    Originating Location (pt. Location): [facility]    Distant Location (provider location): Provider remote location    Mode of Communication:  Video Conference via Polycom    Physician has received verbal consent for a Video Visit from the patient? Yes      DIAGNOSES:  Mood disorder, substance-induced  Amphetamine dependence  Major depressive disorder by history  Anxiety, NOS       EVENT LEADING TO HOSPITALIZATION:  Girma Trejo is a 40 year old male who has a history of anxiety, depression, PTSD, and polysubstance abuse states primarily alcohol and methamphetamines, who was seen at [facility] 3 days ago referred by his  Malina from [facility] with agitation and suicidal and homicidal ideation, under the influence of methamphetamines.  Upon clearing from methamphetamines, patient no longer had suicidal or homicidal ideation, however agreed to an admission.  Labs are reviewed, unremarkable, urine drug screen is negative.  ER notes and DEC assessment are also reviewed.     Patient reports that he has a history of anxiety, PTSD, and depression, states that he relapsed on meth and became suicidal.  Patient states that he was in [facility] treatment back in September, subsequently went to [facility] in [place] and then was referred to a group home in [place], where he spent about a month, however did not like it and decided to move back with his grandmother while he was waiting for [facility] housing.  He  "states that he has been working with [facility] and has a  Malina there, states that he does not want to return to [place], however would not mind going to a different group home and does not want to be around the cities anymore because this is the environment where he will likely relapse again.  Patient states that he relapsed a few weeks ago, smoking marijuana, drinking alcohol and using meth.  Patient reports a longstanding history of substance use dating back to his early teens.  He states that he has tried multiple medications for depression, most recently was on Effexor up to 225 mg [place] however stopped taking it about a week ago.  He stated that yesterday he did take about half of his usual dose, he was restarted on Effexor in the hospital.  Patient reports that even when he is sober, which amounts only to a few months, he has depression, and usually when he is depressed he cannot function at all.  He described inability to sleep, failing hygiene, decreased appetite, hopelessness, low energy, inability to concentrate, feeling suicidal.  He also states that when he uses meth, he experiences paranoid ideation, feeling that somebody is watching the house and following him.  His depression is 8-9 out of 10 currently denies any psychotic symptoms.  States that he is very much interested in returning to treatment and \"ending the cycle\" of relapses and rehospitalization's.       LABS:       Lab Results   Component Value Date     12/22/2020    Lab Results   Component Value Date    CHLORIDE 103 12/22/2020    Lab Results   Component Value Date    BUN 21 12/22/2020      Lab Results   Component Value Date    POTASSIUM 4.4 12/22/2020    Lab Results   Component Value Date    CO2 32 12/22/2020    Lab Results   Component Value Date    CR 1.08 12/22/2020          Lab Results   Component Value Date    WBC 5.3 12/22/2020    HGB 15.7 12/22/2020    HCT 46.7 12/22/2020    " MCV 91 12/22/2020     12/22/2020     Lab Results   Component Value Date    AST 19 12/22/2020    ALT 39 12/22/2020    ALKPHOS 62 12/22/2020    BILITOTAL 0.5 12/22/2020    BILICONJ 0.0 07/31/2013     Lab Results   Component Value Date    TSH 0.92 12/22/2020     Lab Results   Component Value Date    SARSCOVRES NEGATIVE 12/21/2020       CONSULTS:  CD consult    HOSPITAL COURSE:  Girma Trejo was admitted to [facility] with attending Luisito Parks MD as a voluntary patient. The patient was placed under status 15 (15 minute checks) to ensure patient safety.   Patient  did not require seclusion or administration of emergency medications to manage behavior.    All outpatient medications were continued.  Venlafaxine was increased to 225 mg prior to discharge.  Patient expressed strong interest in engaging with residential treatment as recommended by CD consult.    Girma Trejo did participate in groups and was visible in the milieu.     The patient's symptoms of depression improved.     Girma Trejo was released to [facility]. At the time of discharge Girma Trejo was determined to not be a danger to himself or others. At the current time of discharge, the patient does not meet criteria for involuntary hospitalization. On the day of discharge, the patient reports that they do not have suicidal or homicidal ideation and would never hurt themselves or others. Steps taken to minimize risk include: assessing patient s behavior and thought process daily during hospital stay, discharging patient with adequate plan for follow up for mental and physical health and discussing safety plan of returning to the hospital should the patient ever have thoughts of harming themselves or others. Therefore, based on all available evidence including the factors cited above, the patient does not appear to be at imminent risk for self-harm, and is appropriate for outpatient level of care.      DISCHARGE  MEDICATIONS:     Review of your medicines      START taking      Dose / Directions   hydrOXYzine 25 MG tablet  Commonly known as: ATARAX  Used for: Anxiety      Dose: 25 mg  Take 1 tablet (25 mg) by mouth every 4 hours as needed for anxiety  Quantity: 30 tablet  Refills: 0        CONTINUE these medicines which have NOT CHANGED      Dose / Directions   lisinopril 20 MG tablet  Commonly known as: ZESTRIL  Used for: Hypertension, goal below 140/90      Dose: 20 mg  Take 1 tablet (20 mg) by mouth daily  Quantity: 30 tablet  Refills: 0     traZODone 50 MG tablet  Commonly known as: DESYREL  Used for: Major depressive disorder, recurrent episode, moderate (H)      Dose: 50 mg  Take 1 tablet (50 mg) by mouth nightly as needed for sleep  Quantity: 30 tablet  Refills: 0     venlafaxine 225 MG 24 hr tablet  Commonly known as: EFFEXOR-ER  Used for: Major depressive disorder, recurrent episode, moderate (H)      Dose: 225 mg  Take 1 tablet (225 mg) by mouth daily  Quantity: 30 tablet  Refills: 0           Where to get your medicines      These medications were sent to [facility] [address]  [address] [address]    Phone: [phone]     hydrOXYzine 25 MG tablet    lisinopril 20 MG tablet    traZODone 50 MG tablet    venlafaxine 225 MG 24 hr tablet         DISCHARGE MENTAL STATUS EXAM:  Appearance:  awake, alert  Attitude:  cooperative  Eye Contact:  good  Mood:  anxious  Affect:  appropriate and in normal range  Speech:  clear, coherent  Psychomotor Behavior:  no evidence of tardive dyskinesia, dystonia, or tics  Thought Process:  linear  Associations:  no loose associations  Thought Content:  no evidence of psychotic thought, denies suicidal ideation  Insight:  good  Judgment:  intact  Oriented to:  time, person, and place  Attention Span and Concentration:  fair  Recent and Remote Memory:  intact  Language: appropriate  Fund of Knowledge: consistent with  education and experiences  Gait and Station: Normal    DISCHARGE FOLLOW-UP PLAN:  Continue medications as above.     Health Care Follow-up Appointments:   Psychiatry Follow-Up  [facility]   Date/Time: 1/18/21 @11:40AM    Provider: Lulú Hernandez   Phone: [phone]  Fax: [phone]      Therapy  Provider: Malina Pacheco [facility]  P: [phone]  F: [phone]       Billing:    On the day of discharge, I saw the patient and performed the above examination, reviewed discharge medications, reviewed follow-up plan, and assessed safety for discharge. I spent less than 30 minutes on these tasks.    Luisito Parks MD, 12/30/2020, 3:34 PM

## 2020-12-30 NOTE — PLAN OF CARE
Work Completed: WR attended team and reviewed chart.     Discharge plan or goal: Pt to discharge to [facility].                 Barriers to discharge: None

## 2020-12-30 NOTE — PLAN OF CARE
"Pt has spent most of the evening in his room. He did come out for dinner and ate in the lounge, but was otherwise in his room. He endorses anxiety, increased over pending discharge to treatment tomorrow. He states he has no coping skills for anxiety \"Other than using\" and was uninterested in attempts to discuss healthy coping.   He states he has had some cravings while on the unit, particularly with  vivid using dreams.   Denies SI/HI, no hallucinations.     "

## 2020-12-30 NOTE — DISCHARGE INSTRUCTIONS
Behavioral Discharge Planning and Instructions      Summary:  You were admitted on 12/21/2020  due to Suicidal Ideations and Chemical Use Issues.  You were treated by Dr. Luisito Parks MD and discharged on 12/[facility]/20 [facility]      You received a Rule 25 assessment and the recommendation was for residential MICD treatment.     Your therapist, Malina, mentioned that referrals for [facility] and Targeted Case Management had been placed for you and these are still pending. You can follow up with Malina in order to find out when you will be connected to your new providers. Your new patient appointment for psychiatry is listed below. This will occur over TeleHealth, the clinic is going to send a link to your e-mail address in order for you to complete this appointment.     You tested NEGATIVE for COVID-19 and for drugs and alcohol.     Principal Diagnosis:   Mood disorder, substance-induced  Amphetamine dependence  Major depressive disorder by history  Anxiety, NOS    Health Care Follow-up Appointments:   Psychiatry Follow-Up  [facility]   Date/Time: 1/18/21 @11:40AM    Provider: Lulú Hernandez   Phone: [phone]  Fax: [phone]     Therapy  Provider: Malina Pacheco [facility]   [employer]  P: [phone]  F: [phone]    Attend all scheduled appointments with your outpatient providers. Call at least 24 hours in advance if you need to reschedule an appointment to ensure continued access to your outpatient providers.   Major Treatments, Procedures and Findings:  You were provided with: a psychiatric assessment, assessed for medical stability, medication evaluation and/or management, group therapy and milieu management    Symptoms to Report: feeling more aggressive, increased confusion, losing more sleep, mood getting worse or thoughts of suicide    Early warning signs can include: increased depression or  "anxiety sleep disturbances increased thoughts or behaviors of suicide or self-harm  increased unusual thinking, such as paranoia or hearing voices    Safety and Wellness:  Take all medicines as directed.  Make no changes unless your doctor suggests them.  Follow treatment recommendations. Refrain from alcohol and non-prescribed drugs. Ask your support system to help you reduce your access to items that could harm yourself or others. Items could include:    Firearms  Medicines (both prescribed and over-the-counter)  Knives and other sharp objects  Ropes and like materials  Car keys  If there is a concern for safety, call 911.    Resources:   [employer]ness (www.[place].kristina.org): 458.308.8970 or 191-991-7985.  Alcoholics Anonymous (www.alcoholics-anonymous.org): Check your phone book for your local chapter.  National Suicide Prevention Line (www.mentalhealthmn.org): 153-916-QIBR (2902)  [facility] Crisis (COPE) Response - Adult 705 680-2308  Text 4 Life: txt \"LIFE\" to 94683 for immediate support and crisis intervention  Crisis text line: Text \"[place]\" to 861009. Free, confidential, 24/7.      The treatment team has appreciated the opportunity to work with you.     If you have any questions or concerns our unit number is [phone].         "

## 2021-02-18 ENCOUNTER — COMMUNICATION - HEALTHEAST (OUTPATIENT)
Dept: ADMINISTRATIVE | Facility: CLINIC | Age: 41
End: 2021-02-18

## 2021-02-18 ENCOUNTER — OFFICE VISIT - HEALTHEAST (OUTPATIENT)
Dept: FAMILY MEDICINE | Facility: CLINIC | Age: 41
End: 2021-02-18

## 2021-02-18 ENCOUNTER — COMMUNICATION - HEALTHEAST (OUTPATIENT)
Dept: FAMILY MEDICINE | Facility: CLINIC | Age: 41
End: 2021-02-18

## 2021-02-18 DIAGNOSIS — H10.403 CHRONIC CONJUNCTIVITIS OF BOTH EYES, UNSPECIFIED CHRONIC CONJUNCTIVITIS TYPE: ICD-10-CM

## 2021-02-18 DIAGNOSIS — H57.89 IRRITATION OF RIGHT EYE: ICD-10-CM

## 2021-02-18 DIAGNOSIS — I10 ESSENTIAL HYPERTENSION: ICD-10-CM

## 2021-02-18 DIAGNOSIS — G47.30 SLEEP APNEA IN ADULT: ICD-10-CM

## 2021-02-18 DIAGNOSIS — F33.1 MAJOR DEPRESSIVE DISORDER, RECURRENT EPISODE, MODERATE (H): ICD-10-CM

## 2021-02-18 ASSESSMENT — MIFFLIN-ST. JEOR: SCORE: 2564.6

## 2021-05-27 ENCOUNTER — COMMUNICATION - HEALTHEAST (OUTPATIENT)
Dept: FAMILY MEDICINE | Facility: CLINIC | Age: 41
End: 2021-05-27

## 2021-05-27 DIAGNOSIS — F33.1 MAJOR DEPRESSIVE DISORDER, RECURRENT EPISODE, MODERATE (H): ICD-10-CM

## 2021-06-03 ENCOUNTER — RECORDS - HEALTHEAST (OUTPATIENT)
Dept: ADMINISTRATIVE | Facility: CLINIC | Age: 41
End: 2021-06-03

## 2021-06-05 VITALS
HEIGHT: 75 IN | BODY MASS INDEX: 39.17 KG/M2 | DIASTOLIC BLOOD PRESSURE: 84 MMHG | OXYGEN SATURATION: 96 % | WEIGHT: 315 LBS | SYSTOLIC BLOOD PRESSURE: 126 MMHG | HEART RATE: 89 BPM

## 2021-06-15 NOTE — PROGRESS NOTES
Assessment:   1. Sleep apnea in adult  Patient has appointment with sleep clinic to address his sleep issues. He has had to use CPAP in the past and he is wiling to get back to using it again.     2. Major depressive disorder, recurrent episode, moderate (H)  Stable continue current medication.   - venlafaxine 225 mg TR24; Take 225 mg by mouth daily.  Dispense: 30 each; Refill: 2    3. Essential hypertension  Stable, continue current medication.   - lisinopriL (PRINIVIL,ZESTRIL) 20 MG tablet; Take 1 tablet (20 mg total) by mouth daily.  Dispense: 30 tablet; Refill: 2    4. Irritation of right eye   Discussed the diagnosis and proper care of conjunctivitis.  Stressed household hygiene.  Ophthalmic drops per orders.  Antihistamines per orders.  Local eye care discussed.  Analgesics as needed.       Plan:   Reviewed concept of anxiety as biochemical imbalance of neurotransmitters and rationale for treatment.  Instructed patient to contact office or on-call physician promptly should condition worsen or any new symptoms appear and provided on-call telephone numbers. IF THE PATIENT HAS ANY SUICIDAL OR HOMICIDAL IDEATIONS, CALL THE OFFICE, DISCUSS WITH A SUPPORT MEMBER, OR GO TO THE ER IMMEDIATELY. Patient was agreeable with this plan.  Follow up: 3 months.     Subjective:   Girma Trejo is a 40 y.o. male who presents to the clinic as a new patient. Patient with history of major depressive pressure disorder, on phentermine and other psychostimulant dependence, alcohol dependence, mood disorder, obsessive-compulsive disorder, antisocial personality disorder, generalized anxiety disorder, obesity, combined drug dependence excluding opioid, hypertension, marijuana smoker, suicidal ideations, and sleep disorder.  Patient reported that he resides in a half house and he is in severity treatment.  He reported that he has not had a drink for about 2 months and he has not used drugs for about 2 months.  He stated that he  "currently smokes cigarettes.  He reported that he is mostly concerned with having his medications refills as the physicians at his recovery house are not willing to refill his medications.  Medications include his venlafaxine and his lisinopril.  Patient also reported that he has had some issues on his right eye for the past 3 weeks.  He stated that when he winks his right eye that he sometimes feels some pressure.  He stated that this has been going on for the past 3 weeks.  He denied any discharge or redness.  He denied any loss of vision.  Patient reported that his mental health is stable at this time.  He denied any suicidal or homicidal ideations.      The following portions of the patient's history were reviewed and updated as appropriate: allergies, current medications, past family history, past medical history, past social history, past surgical history and problem list.    Review of Systems  A 12 point comprehensive review of systems was negative except as noted.      Objective:      /84 (Patient Site: Right Arm, Patient Position: Sitting, Cuff Size: Adult Large)   Pulse 89   Ht 6' 3\" (1.905 m)   Wt (!) 348 lb 1.6 oz (157.9 kg)   SpO2 96%   BMI 43.51 kg/m     General:  alert, appears stated age and cooperative   Affect & Behavior:  full facial expressions, good grooming, good insight, normal perception, normal reasoning, normal speech pattern and content and normal thought patterns      Eyes:  conjunctivae/corneas clear. PERRL, EOM's intact. Fundi benign.   Vision: Not performed   Fluorescein:  not done       45 minutes spent on the date of the encounter doing chart review, review of outside records, review of test results, patient visit and documentation        Tobacco Cessation:   reports that he has been smoking cigarettes. He has a 6.25 pack-year smoking history. He has never used smokeless tobacco.  The following high BMI interventions were performed this visit: encouragement to exercise and " "dietary management education, guidance, and counseling  BMI:   Estimated body mass index is 43.51 kg/m  as calculated from the following:    Height as of this encounter: 6' 3\" (1.905 m).    Weight as of this encounter: 348 lb 1.6 oz (157.9 kg).     No follow-ups on file.    RUPERT Worthington  [facility]          "

## 2021-06-17 NOTE — TELEPHONE ENCOUNTER
Central PA team  [phone]  Pool: HE PA MED ([id number])          PA has been initiated.       PA form completed and faxed insurance via Cover My Meds     Key: [id number] - PA Case ID: [id number]     Medication:  Azelastine HCl 0.05% solution    Insurance:  [email or web address]        Response will be received via fax and may take up to 5-10 business days depending on plan      
Please advise, alternatives are listed below  
Prior Authorization Request  Who s requesting:  Pharmacy  Pharmacy Name and Location: [id number], [place]   Medication Name: Azelastine 0.05% ophth solution 6 mL   Insurance Plan: [employer] [place]   Insurance Member ID Number:  [id number]  CoverMyMeds Key: N/A  Informed patient that prior authorizations can take up to 10 business days for response:   No  Okay to leave a detailed message: No        
Telephone Encounter by Cathy eBnson at 2/25/2021  8:10 AM     Author: Cathy Benson Service: -- Author Type: --    Filed: 2/25/2021  8:10 AM Encounter Date: 2/18/2021 Status: Signed    : Cathy Benson       Please advise:           
regular

## 2021-07-14 PROBLEM — F32.5 MAJOR DEPRESSION IN REMISSION (H): Status: RESOLVED | Noted: 2020-12-19 | Resolved: 2021-02-18

## 2021-08-03 PROBLEM — F15.20 AMPHETAMINE USE DISORDER, SEVERE, DEPENDENCE (H): Status: RESOLVED | Noted: 2020-12-19 | Resolved: 2021-02-18

## 2022-08-15 LAB
ALT SERPL-CCNC: 75 U/L (ref 4–33)
AST SERPL-CCNC: 42 U/L (ref 13–39)
CREATININE (EXTERNAL): 1 MG/DL (ref 0.7–1.3)
GLUCOSE (EXTERNAL): 279 MG/DL (ref 70–99)
POTASSIUM (EXTERNAL): 4.2 MMOL/L (ref 3.5–5.1)

## 2022-12-19 LAB
ALT SERPL-CCNC: 64 U/L (ref 4–33)
AST SERPL-CCNC: 40 U/L (ref 13–39)
CREATININE (EXTERNAL): 1.2 MG/DL (ref 0.7–1.3)
GLUCOSE (EXTERNAL): 220 MG/DL (ref 70–99)
HBA1C MFR BLD: 8 % (ref 4–5.6)
POTASSIUM (EXTERNAL): 4.4 MMOL/L (ref 3.5–5.1)

## 2022-12-29 ENCOUNTER — TRANSFERRED RECORDS (OUTPATIENT)
Dept: HEALTH INFORMATION MANAGEMENT | Facility: CLINIC | Age: 42
End: 2022-12-29

## 2022-12-29 LAB
ALT SERPL-CCNC: 60 U/L (ref 4–33)
AST SERPL-CCNC: 43 U/L (ref 13–39)
GLUCOSE (EXTERNAL): 245 MG/DL (ref 70–99)
POTASSIUM (EXTERNAL): 4.2 MMOL/L (ref 3.5–5.1)

## 2023-01-09 ENCOUNTER — TRANSFERRED RECORDS (OUTPATIENT)
Dept: HEALTH INFORMATION MANAGEMENT | Facility: CLINIC | Age: 43
End: 2023-01-09

## 2023-01-16 ENCOUNTER — MEDICAL CORRESPONDENCE (OUTPATIENT)
Dept: HEALTH INFORMATION MANAGEMENT | Facility: CLINIC | Age: 43
End: 2023-01-16

## 2023-01-17 ENCOUNTER — TRANSCRIBE ORDERS (OUTPATIENT)
Dept: OTHER | Age: 43
End: 2023-01-17

## 2023-01-17 DIAGNOSIS — E66.01 MORBID (SEVERE) OBESITY DUE TO EXCESS CALORIES (H): Primary | ICD-10-CM

## 2023-01-17 DIAGNOSIS — R79.89 OTHER SPECIFIED ABNORMAL FINDINGS OF BLOOD CHEMISTRY: ICD-10-CM

## 2023-01-19 NOTE — TELEPHONE ENCOUNTER
DIAGNOSIS: Elevated ALT Measurement, Morbid (severe) obesity due to excess calories (H), Other specified abnormal findings of blood chemistry   Appt Date: 02.27.2023    NOTES STATUS DETAILS   OFFICE NOTE from referring provider Care Everywhere 01.17.2023 Claar Beck [facility]   OFFICE NOTES from other specialists Internal 10.22.2020 Thomas Gunn PA   DISCHARGE SUMMARY from hospital     MEDICATION LIST Care Everywhere / Internal    LIVER BIOSPY (IF APPLICABLE)      PATHOLOGY REPORTS      IMAGING     ENDOSCOPY (IF AVAILABLE)     COLONOSCOPY (IF AVAILABLE)     ULTRASOUND LIVER     CT OF ABDOMEN     MRI OF LIVER     FIBROSCAN, US ELASTOGRAPHY, FIBROSIS SCAN, MR ELASTOGRAPHY     LABS     HEPATIC PANEL (LIVER PANEL) Care Everywhere 10.19.2022   BASIC METABOLIC PANEL Care Everywhere 10.19.2022   COMPLETE METABOLIC PANEL Care Everywhere 10.19.2022   COMPLETE BLOOD COUNT (CBC) Care Everywhere 10.19.2022   INTERNATIONAL NORMALIZED RATIO (INR)     HEPATITIS C ANTIBODY     HEPATITIS C VIRAL LOAD/PCR     HEPATITIS C GENOTYPE     HEPATITIS B SURFACE ANTIGEN     HEPATITIS B SURFACE ANTIBODY     HEPATITIS B DNA QUANT LEVEL     HEPATITIS B CORE ANTIBODY

## 2023-02-26 NOTE — PROGRESS NOTES
REASON FOR CONSULTATION: elevated liver tests  REFERRING PROVIDER: Clara Beck NP [facility]    A/P  Girma Trejo is a 42 year old male with mildly elevated ALT/AST and fatty liver/hepatomegaly on imaging. He has h/o heavy alcohol use (no alcohol for 8 weeks) as well as RF for fatty liver: severe morbid obesity BMI 43, HTN, DM.    His overall picture is consistent with fatty liver and heavy alcohol use. I recommended he focus on continued sobriety, weight loss and diabetes. I would not attribute his symptoms of nausea, side pain, and bloating to the liver. He has no ascites and does not have any evidence of advanced liver disease. The most significant risks to his health and sense of well being/symptom management surround his DM control, obesity and mental health. We discussed the effects on the GI tract of diet and elevated blood sugars.    We discussed fatty liver, alcohol use and how to keep the liver healthy: alcohol cessation, losing weight and managing DM. Given the number of medical issues he has, he may benefit from establishing care with an IM or FP MD/DO.    DM on oral medications. Inadequate control with A1C 8.0 12/19/22 and pt self report that it is not under good control. He asked about seeing a dietician, which I recommend.   HTN on lisinopril and metoprolol  Obesity BMI 43. It sounds like Ozempic may have been discussed with him. I support this and/or other means for weight loss.  Sleep-disordered breathing from sleep study 9/14/22    Mental health See psychiatrist from [facility] about every 3 month for depression, anxiety/social anxiety, and PTSD. He is on disability.    He does not need to return to the liver clinic unless there are changes in his liver health picture.      Jennie Mariscal MD  Hepatology/Liver Transplant  Medical Director, Liver Transplantation  [facility]  Subjective  Girma Trejo is a 42 year old male referred for elevated liver  "tests and fatty liver on imaging.    Labs over last 6 m  ALT  60-75  AST 40-43  No other LFTs for >2 y  HCV neg  HBV HBsAby 2012 neg  Iron panel no record  CT 1/17/23 fatty liver    He states he has been struggling with \"not feeling well for a number of months\" and he was told it might be related to his liver.    He stopped drinking 12/2022 because he wasn't feeling well. He was unable to keep food down at times, had severe side pains L>>R. Most often occurred when bending over. Has frequent nausea. He was given zofran which helps. Rare vomiting. Also gets feeling of fullness. Spicy and fatty foods make him feel worse. Some diarrhea off and on. Random itchiness.    Risk factors for fatty liver disease: obesity current weight 342, BMI 42.7, highest weight 390.    ETOH use: stopped 12/19/22. Was 3-4x/week, beer (12 pack+ in one sitting) and/or whiskey. Records state dx of alcoholism with multiple treatments  Last drug use a year ago.  He has been going to [employer] meetings for sobriety support, online, infrequently.    Past Medical History:   Diagnosis Date     Alcoholism (H)     treatment multiple times, most recently 2016     Ankle fracture as child     Anxiety      Chicken pox      Chronic chest pain     wall pain vs anxiety, several ER visits and stress test negative     Concussion 2007     Diabetes (H)      Hyperlipidemia      Hypertension      Hypertension, goal below 140/90      Major depression in remission (H) age 10    no suicide attempts, [facility], [facility]     Major depressive disorder, recurrent episode, moderate (H)      Marijuana smoker     in [place]     Morbid obesity (H)      Nasal fracture      OCD (obsessive compulsive disorder)      OCD (obsessive compulsive disorder)      Polysubstance (excluding opioids) dependence, binge pattern (H)     Prior meth abuse--treatment in 2016     Polysubstance abuse (H)      Psoriasis of scalp      Sleep apnea in adult      Smoker     1/3 ppd     Suicidal ideation      Wrist " fracture as child    right   Admitted 12/2020 for agitation, suicidal and homicidal ideation in the setting of polysubstance abuse--alcohol and methamphetamines--anxiety, PTSD, depression     SHX  Lives alone in [facility].  Family History   Problem Relation Age of Onset     Depression Mother      Heart Surgery Mother         aortic valve abnormality     Hypertension Father      Pre-Diabetes Father      Diabetes Maternal Aunt      Diabetes Paternal Grandmother      Breast Cancer Maternal Aunt      Alcohol/Drug Maternal Uncle         and 1st cousin     Cancer Paternal Grandfather      Asthma No family hx of      C.A.D. No family hx of      Cerebrovascular Disease No family hx of      Cancer - colorectal No family hx of      Prostate Cancer No family hx of      Allergies No family hx of      Alzheimer Disease No family hx of      Anesthesia Reaction No family hx of      Arthritis No family hx of      Blood Disease No family hx of      Circulatory No family hx of      Congenital Anomalies No family hx of      Connective Tissue Disorder No family hx of      Endocrine Disease No family hx of      Eye Disorder No family hx of      Gastrointestinal Disease No family hx of      Genitourinary Problems No family hx of      Gynecology No family hx of      Lipids No family hx of      Musculoskeletal Disorder No family hx of      Neurologic Disorder No family hx of      Obesity No family hx of      Osteoporosis No family hx of      Respiratory No family hx of      Thyroid Disease No family hx of      Substance Abuse Other      Alcoholism Other      Heart Disease Mother      Diabetes Father      Substance Abuse Paternal Grandmother        ROS 10 point ROS neg other than the symptoms noted above in the HPI.  Exam  Gen Alert pleasant NAD. Morbidly obese  Resp No difficulty breathing. No cough  Skin No Jaundice  Eyes No icterus  Neuro VILLEDA  MSK no muscle wasting  Psyche Pleasant, appropriate. Well groomed.  Giram Trejo is a 42  year old male who is being evaluated via a billable video visit.    Video-Visit Details  Type of service:  Video Visit  Video Start Time: 914  Video End Time:1012  Originating Location (pt. Location):home  Distant Location (provider location):  [facility] HEPATOLOG[facility]      Platform used for Video Visit: Flipboard    Time today in minutes 78  Record review 30  Communication with care team 0  Face to face with patient on video 28  Documentation 20

## 2023-02-27 ENCOUNTER — PRE VISIT (OUTPATIENT)
Dept: GASTROENTEROLOGY | Facility: CLINIC | Age: 43
End: 2023-02-27
Payer: MEDICARE

## 2023-02-27 ENCOUNTER — VIRTUAL VISIT (OUTPATIENT)
Dept: GASTROENTEROLOGY | Facility: CLINIC | Age: 43
End: 2023-02-27
Attending: INTERNAL MEDICINE
Payer: MEDICARE

## 2023-02-27 DIAGNOSIS — E66.01 MORBID (SEVERE) OBESITY DUE TO EXCESS CALORIES (H): ICD-10-CM

## 2023-02-27 DIAGNOSIS — R79.89 OTHER SPECIFIED ABNORMAL FINDINGS OF BLOOD CHEMISTRY: ICD-10-CM

## 2023-02-27 DIAGNOSIS — K76.0 FATTY LIVER DISEASE, NONALCOHOLIC: Primary | ICD-10-CM

## 2023-02-27 DIAGNOSIS — K70.0 FATTY LIVER DUE TO ALCOHOLISM: ICD-10-CM

## 2023-02-27 PROCEDURE — 99205 OFFICE O/P NEW HI 60 MIN: CPT | Mod: VID | Performed by: INTERNAL MEDICINE

## 2023-02-27 RX ORDER — ONDANSETRON 4 MG/1
TABLET, ORALLY DISINTEGRATING ORAL
COMMUNITY
Start: 2023-02-08

## 2023-02-27 RX ORDER — VENLAFAXINE HYDROCHLORIDE 150 MG/1
150 CAPSULE, EXTENDED RELEASE ORAL
COMMUNITY
Start: 2021-05-27

## 2023-02-27 RX ORDER — HYDROXYZINE HYDROCHLORIDE 25 MG/1
25 TABLET, FILM COATED ORAL
COMMUNITY

## 2023-02-27 RX ORDER — LISINOPRIL 10 MG/1
TABLET ORAL
COMMUNITY
Start: 2022-08-17

## 2023-02-27 RX ORDER — TEMAZEPAM 7.5 MG/1
CAPSULE ORAL
COMMUNITY
Start: 2021-06-29

## 2023-02-27 RX ORDER — BLOOD SUGAR DIAGNOSTIC
1 STRIP MISCELLANEOUS
COMMUNITY
Start: 2021-08-18

## 2023-02-27 RX ORDER — METOPROLOL SUCCINATE 50 MG/1
TABLET, EXTENDED RELEASE ORAL
COMMUNITY
Start: 2022-03-31

## 2023-02-27 NOTE — NURSING NOTE
Is the patient currently in the state of [place]? YES    Visit mode:VIDEO    If the visit is dropped, the patient can be reconnected by: VIDEO VISIT: Text to cell phone: [phone]    Will anyone else be joining the visit? NO      How would you like to obtain your AVS? MyChart    Are changes needed to the allergy or medication list? YES: UNABLE TO REVIEW MED LIST-QUICK CHECK IN    Reason for visit: New pt    Very quick check in due to time- no rooming completed-    Erik Bal

## 2023-02-27 NOTE — LETTER
2/27/2023         RE: Girma Trejo  [address]  [address]  [facility] [address]        Dear Colleague,    Thank you for referring your patient, Girma Trejo, to the [facility] [place]. Please see a copy of my visit note below.    REASON FOR CONSULTATION: elevated liver tests  REFERRING PROVIDER: Clara Beck NP [facility]    A/P  Girma Trejo is a 42 year old male with mildly elevated ALT/AST and fatty liver/hepatomegaly on imaging. He has h/o heavy alcohol use (no alcohol for 8 weeks) as well as RF for fatty liver: severe morbid obesity BMI 43, HTN, DM.    His overall picture is consistent with fatty liver and heavy alcohol use. I recommended he focus on continued sobriety, weight loss and diabetes. I would not attribute his symptoms of nausea, side pain, and bloating to the liver. He has no ascites and does not have any evidence of advanced liver disease. The most significant risks to his health and sense of well being/symptom management surround his DM control, obesity and mental health. We discussed the effects on the GI tract of diet and elevated blood sugars.    We discussed fatty liver, alcohol use and how to keep the liver healthy: alcohol cessation, losing weight and managing DM. Given the number of medical issues he has, he may benefit from establishing care with an IM or FP MD/DO.    DM on oral medications. Inadequate control with A1C 8.0 12/19/22 and pt self report that it is not under good control. He asked about seeing a dietician, which I recommend.   HTN on lisinopril and metoprolol  Obesity BMI 43. It sounds like Ozempic may have been discussed with him. I support this and/or other means for weight loss.  Sleep-disordered breathing from sleep study 9/14/22    Mental health See psychiatrist from [facility] about every 3 month for depression, anxiety/social anxiety, and PTSD. He is on disability.    He does not need to return to the  "liver clinic unless there are changes in his liver health picture.      Jennie Mariscal MD  Hepatology/Liver Transplant  Medical Director, Liver Transplantation  [facility]  Subjective  Girma Trejo is a 42 year old male referred for elevated liver tests and fatty liver on imaging.    Labs over last 6 m  ALT  60-75  AST 40-43  No other LFTs for >2 y  HCV neg  HBV HBsAby 2012 neg  Iron panel no record  CT 1/17/23 fatty liver    He states he has been struggling with \"not feeling well for a number of months\" and he was told it might be related to his liver.    He stopped drinking 12/2022 because he wasn't feeling well. He was unable to keep food down at times, had severe side pains L>>R. Most often occurred when bending over. Has frequent nausea. He was given zofran which helps. Rare vomiting. Also gets feeling of fullness. Spicy and fatty foods make him feel worse. Some diarrhea off and on. Random itchiness.    Risk factors for fatty liver disease: obesity current weight 342, BMI 42.7, highest weight 390.    ETOH use: stopped 12/19/22. Was 3-4x/week, beer (12 pack+ in one sitting) and/or whiskey. Records state dx of alcoholism with multiple treatments  Last drug use a year ago.  He has been going to [employer] meetings for sobriety support, online, infrequently.    Past Medical History:   Diagnosis Date     Alcoholism (H)     treatment multiple times, most recently 2016     Ankle fracture as child     Anxiety      Chicken pox      Chronic chest pain     wall pain vs anxiety, several ER visits and stress test negative     Concussion 2007     Diabetes (H)      Hyperlipidemia      Hypertension      Hypertension, goal below 140/90      Major depression in remission (H) age 10    no suicide attempts, [facility], [facility]     Major depressive disorder, recurrent episode, moderate (H)      Marijuana smoker     in [place]     Morbid obesity (H)      Nasal fracture      OCD (obsessive compulsive disorder)      OCD (obsessive " compulsive disorder)      Polysubstance (excluding opioids) dependence, binge pattern (H)     Prior meth abuse--treatment in 2016     Polysubstance abuse (H)      Psoriasis of scalp      Sleep apnea in adult      Smoker     1/3 ppd     Suicidal ideation      Wrist fracture as child    right   Admitted 12/2020 for agitation, suicidal and homicidal ideation in the setting of polysubstance abuse--alcohol and methamphetamines--anxiety, PTSD, depression     SHX  Lives alone in [facility].  Family History   Problem Relation Age of Onset     Depression Mother      Heart Surgery Mother         aortic valve abnormality     Hypertension Father      Pre-Diabetes Father      Diabetes Maternal Aunt      Diabetes Paternal Grandmother      Breast Cancer Maternal Aunt      Alcohol/Drug Maternal Uncle         and 1st cousin     Cancer Paternal Grandfather      Asthma No family hx of      C.A.D. No family hx of      Cerebrovascular Disease No family hx of      Cancer - colorectal No family hx of      Prostate Cancer No family hx of      Allergies No family hx of      Alzheimer Disease No family hx of      Anesthesia Reaction No family hx of      Arthritis No family hx of      Blood Disease No family hx of      Circulatory No family hx of      Congenital Anomalies No family hx of      Connective Tissue Disorder No family hx of      Endocrine Disease No family hx of      Eye Disorder No family hx of      Gastrointestinal Disease No family hx of      Genitourinary Problems No family hx of      Gynecology No family hx of      Lipids No family hx of      Musculoskeletal Disorder No family hx of      Neurologic Disorder No family hx of      Obesity No family hx of      Osteoporosis No family hx of      Respiratory No family hx of      Thyroid Disease No family hx of      Substance Abuse Other      Alcoholism Other      Heart Disease Mother      Diabetes Father      Substance Abuse Paternal Grandmother        ROS 10 point ROS neg other  than the symptoms noted above in the HPI.  Exam  Gen Alert pleasant NAD. Morbidly obese  Resp No difficulty breathing. No cough  Skin No Jaundice  Eyes No icterus  Neuro VILLEDA  MSK no muscle wasting  Psyche Pleasant, appropriate. Well groomed.  Girma Trejo is a 42 year old male who is being evaluated via a billable video visit.             Time today in minutes 78  Record review 30  Communication with care team 0  Face to face with patient on video 28  Documentation 20        Again, thank you for allowing me to participate in the care of your patient.        Sincerely,        Jennie Mariscal MD

## 2023-09-24 ENCOUNTER — HOSPITAL ENCOUNTER (EMERGENCY)
Facility: CLINIC | Age: 43
Discharge: HOME | End: 2023-09-24
Attending: EMERGENCY MEDICINE | Admitting: EMERGENCY MEDICINE
Payer: MEDICARE

## 2023-09-24 VITALS
OXYGEN SATURATION: 95 % | HEART RATE: 110 BPM | BODY MASS INDEX: 39.17 KG/M2 | TEMPERATURE: 98 F | DIASTOLIC BLOOD PRESSURE: 72 MMHG | SYSTOLIC BLOOD PRESSURE: 124 MMHG | RESPIRATION RATE: 20 BRPM | WEIGHT: 315 LBS | HEIGHT: 75 IN

## 2023-09-24 DIAGNOSIS — Z79.4 TYPE 2 DIABETES MELLITUS WITH HYPERGLYCEMIA, WITH LONG-TERM CURRENT USE OF INSULIN (H): ICD-10-CM

## 2023-09-24 DIAGNOSIS — E11.65 TYPE 2 DIABETES MELLITUS WITH HYPERGLYCEMIA, WITH LONG-TERM CURRENT USE OF INSULIN (H): ICD-10-CM

## 2023-09-24 DIAGNOSIS — U07.1 COVID-19 VIRUS INFECTION: ICD-10-CM

## 2023-09-24 LAB
ALBUMIN SERPL BCG-MCNC: 4.6 G/DL (ref 3.5–5.2)
ALBUMIN UR-MCNC: 20 MG/DL
ALP SERPL-CCNC: 76 U/L (ref 40–129)
ALT SERPL W P-5'-P-CCNC: 102 U/L (ref 0–70)
ANION GAP SERPL CALCULATED.3IONS-SCNC: 12 MMOL/L (ref 7–15)
APPEARANCE UR: CLEAR
AST SERPL W P-5'-P-CCNC: 74 U/L (ref 0–45)
BASOPHILS # BLD AUTO: 0 10E3/UL (ref 0–0.2)
BASOPHILS NFR BLD AUTO: 1 %
BILIRUB SERPL-MCNC: 0.5 MG/DL
BILIRUB UR QL STRIP: NEGATIVE
BUN SERPL-MCNC: 22.6 MG/DL (ref 6–20)
CALCIUM SERPL-MCNC: 9.8 MG/DL (ref 8.6–10)
CHLORIDE SERPL-SCNC: 98 MMOL/L (ref 98–107)
COLOR UR AUTO: YELLOW
CREAT SERPL-MCNC: 1.09 MG/DL (ref 0.67–1.17)
DEPRECATED HCO3 PLAS-SCNC: 26 MMOL/L (ref 22–29)
EGFRCR SERPLBLD CKD-EPI 2021: 86 ML/MIN/1.73M2
EOSINOPHIL # BLD AUTO: 0.1 10E3/UL (ref 0–0.7)
EOSINOPHIL NFR BLD AUTO: 2 %
ERYTHROCYTE [DISTWIDTH] IN BLOOD BY AUTOMATED COUNT: 12.7 % (ref 10–15)
FLUAV RNA SPEC QL NAA+PROBE: NEGATIVE
FLUBV RNA RESP QL NAA+PROBE: NEGATIVE
GLUCOSE BLDC GLUCOMTR-MCNC: 228 MG/DL (ref 70–99)
GLUCOSE BLDC GLUCOMTR-MCNC: 325 MG/DL (ref 70–99)
GLUCOSE SERPL-MCNC: 351 MG/DL (ref 70–99)
GLUCOSE UR STRIP-MCNC: >=1000 MG/DL
HCT VFR BLD AUTO: 47.5 % (ref 40–53)
HGB BLD-MCNC: 16.3 G/DL (ref 13.3–17.7)
HGB UR QL STRIP: NEGATIVE
HOLD SPECIMEN: NORMAL
HOLD SPECIMEN: NORMAL
IMM GRANULOCYTES # BLD: 0 10E3/UL
IMM GRANULOCYTES NFR BLD: 0 %
KETONES UR STRIP-MCNC: ABNORMAL MG/DL
LEUKOCYTE ESTERASE UR QL STRIP: NEGATIVE
LIPASE SERPL-CCNC: 43 U/L (ref 13–60)
LYMPHOCYTES # BLD AUTO: 1.5 10E3/UL (ref 0.8–5.3)
LYMPHOCYTES NFR BLD AUTO: 20 %
MCH RBC QN AUTO: 30.5 PG (ref 26.5–33)
MCHC RBC AUTO-ENTMCNC: 34.3 G/DL (ref 31.5–36.5)
MCV RBC AUTO: 89 FL (ref 78–100)
MONOCYTES # BLD AUTO: 0.7 10E3/UL (ref 0–1.3)
MONOCYTES NFR BLD AUTO: 9 %
MUCOUS THREADS #/AREA URNS LPF: PRESENT /LPF
NEUTROPHILS # BLD AUTO: 5.2 10E3/UL (ref 1.6–8.3)
NEUTROPHILS NFR BLD AUTO: 68 %
NITRATE UR QL: NEGATIVE
NRBC # BLD AUTO: 0 10E3/UL
NRBC BLD AUTO-RTO: 0 /100
PH UR STRIP: 6 [PH] (ref 5–7)
PLATELET # BLD AUTO: 216 10E3/UL (ref 150–450)
POTASSIUM SERPL-SCNC: 4.8 MMOL/L (ref 3.4–5.3)
PROT SERPL-MCNC: 7.3 G/DL (ref 6.4–8.3)
RBC # BLD AUTO: 5.35 10E6/UL (ref 4.4–5.9)
RBC URINE: 0 /HPF
RSV RNA SPEC NAA+PROBE: NEGATIVE
SARS-COV-2 RNA RESP QL NAA+PROBE: POSITIVE
SODIUM SERPL-SCNC: 136 MMOL/L (ref 136–145)
SP GR UR STRIP: 1.02 (ref 1–1.03)
SQUAMOUS EPITHELIAL: <1 /HPF
UROBILINOGEN UR STRIP-MCNC: NORMAL MG/DL
WBC # BLD AUTO: 7.5 10E3/UL (ref 4–11)
WBC URINE: <1 /HPF

## 2023-09-24 PROCEDURE — 83690 ASSAY OF LIPASE: CPT | Performed by: EMERGENCY MEDICINE

## 2023-09-24 PROCEDURE — 258N000003 HC RX IP 258 OP 636: Performed by: EMERGENCY MEDICINE

## 2023-09-24 PROCEDURE — 36415 COLL VENOUS BLD VENIPUNCTURE: CPT | Performed by: EMERGENCY MEDICINE

## 2023-09-24 PROCEDURE — 85025 COMPLETE CBC W/AUTO DIFF WBC: CPT | Performed by: EMERGENCY MEDICINE

## 2023-09-24 PROCEDURE — 82962 GLUCOSE BLOOD TEST: CPT

## 2023-09-24 PROCEDURE — 80053 COMPREHEN METABOLIC PANEL: CPT | Performed by: EMERGENCY MEDICINE

## 2023-09-24 PROCEDURE — 96361 HYDRATE IV INFUSION ADD-ON: CPT

## 2023-09-24 PROCEDURE — 81001 URINALYSIS AUTO W/SCOPE: CPT | Performed by: EMERGENCY MEDICINE

## 2023-09-24 PROCEDURE — 250N000012 HC RX MED GY IP 250 OP 636 PS 637: Performed by: EMERGENCY MEDICINE

## 2023-09-24 PROCEDURE — 99284 EMERGENCY DEPT VISIT MOD MDM: CPT | Mod: 25

## 2023-09-24 PROCEDURE — 250N000013 HC RX MED GY IP 250 OP 250 PS 637: Performed by: EMERGENCY MEDICINE

## 2023-09-24 PROCEDURE — 96360 HYDRATION IV INFUSION INIT: CPT

## 2023-09-24 PROCEDURE — 87637 SARSCOV2&INF A&B&RSV AMP PRB: CPT | Performed by: EMERGENCY MEDICINE

## 2023-09-24 RX ORDER — GLIPIZIDE 10 MG/1
10 TABLET ORAL ONCE
Status: COMPLETED | OUTPATIENT
Start: 2023-09-24 | End: 2023-09-24

## 2023-09-24 RX ADMIN — INSULIN GLARGINE 10 UNITS: 100 INJECTION, SOLUTION SUBCUTANEOUS at 22:43

## 2023-09-24 RX ADMIN — GLIPIZIDE 10 MG: 10 TABLET ORAL at 22:42

## 2023-09-24 RX ADMIN — METFORMIN HYDROCHLORIDE 1000 MG: 500 TABLET, FILM COATED ORAL at 22:17

## 2023-09-24 RX ADMIN — SODIUM CHLORIDE 1000 ML: 9 INJECTION, SOLUTION INTRAVENOUS at 18:53

## 2023-09-24 ASSESSMENT — ACTIVITIES OF DAILY LIVING (ADL)
ADLS_ACUITY_SCORE: 37
ADLS_ACUITY_SCORE: 35

## 2023-09-24 NOTE — ED TRIAGE NOTES
Pt presents with concern for hyperglycemia.  at [facility]. Pt anxious and diaphoretic in triage. Pt reports feeling upset that he may have missed a dose of medication leading to this. Pt is type II controlled with PO and insulin. Pt endorses feeling dizzy and blurred vision. A & Ox4.     Triage Assessment       Row Name 09/24/23 [id number]       Triage Assessment (Adult)    Airway WDL WDL       Respiratory WDL    Respiratory WDL WDL       Skin Circulation/Temperature WDL    Skin Circulation/Temperature WDL WDL       Cardiac WDL    Cardiac WDL X;all    Pulse Rate & Regularity tachycardic       Peripheral/Neurovascular WDL    Peripheral Neurovascular WDL WDL       Cognitive/Neuro/Behavioral WDL    Cognitive/Neuro/Behavioral WDL WDL

## 2023-09-25 NOTE — ED PROVIDER NOTES
"  History     Chief Complaint:  Hyperglycemia     The history is provided by the patient.      Girma Trejo is a 43 year old male with a history of type 2 diabetes mellitus who presents with hyperglycemia. His 376 was at [facility]. He notes he forgot to take some medications and his insulin last night and forgot. He denies drinking alcohol. He takes 10 units of Lantus which he started recently. He denies getting any other doses throughout the day. He has a diabetes doctor in [place] at [place]. He also takes 1,000 mg of Metformin and 10 mg of Glipizide.He notes he had COVID-19 less than one month ago. His doctor discussed starting Ozempic which he has not started yet.     Independent Historian:   None - Patient Only    Review of External Notes:   None    Medications:    Hydroxyzine   Lisinopril   Metformin   Metoprolol succinate ER  Ondansetron   Temazepam   Venlafaxine   Amlodipine  Insulin glargine     Past Medical History:    Alcoholism   Ankle fracture  Anxiety  Chicken pox  Chronic chest pain  Concussion  Hyperlipidemia  Hypertension  Major depression in remission  Morbid obesity  Nasal fracture  RLS  OCD  Polysubstance dependence, binge pattern  Psoriasis of scalp  Suicidal ideation  Wrist fracture  Cellulitis  Metabolic syndrome  Moderate amphetamine substance use disorder  Panic disorder with agoraphobia   Type 2 diabetes mellitus without complication   Antisocial personality disorder  MRSA  Alcohol dependence  Cannabis dependence  Chemical dependency     Past Surgical History:    Appendectomy  ENT surgery  HC tooth extraction w/ forcep  PE tubes    Oral surgery    Physical Exam   Patient Vitals for the past 24 hrs:   BP Temp Temp src Pulse Resp SpO2 Height Weight   09/24/23 2200 -- -- -- -- -- 95 % -- --   09/24/23 2145 124/72 -- -- -- -- -- -- --   09/24/23 1839 139/87 98  F (36.7  C) Temporal 110 20 97 % 1.905 m (6' 3\") (!) 150.3 kg (331 lb 5.6 oz)      Physical Exam  Constitutional: Patient is well " appearing. No distress.  Not DKA  Head: Atraumatic.  Eyes: Conjunctivae and EOM are normal. No scleral icterus.  Neck: Normal range of motion. Neck supple.   Cardiovascular: Normal rate, regular rhythm, normal heart sounds and intact distal perfusion.   Pulmonary/Chest: Breath sounds normal. No respiratory distress.  Abdominal: Soft. Bowel sounds are normal. No distension. No tenderness. No rebound or guarding.   Musculoskeletal: Normal range of motion. No edema or tenderness.   Neurological: Alert and orientated to person, place, and time. No observable focal neuro deficit  Skin: Warm and dry. No rash noted. Not diaphoretic.      Emergency Department Course     Laboratory:  Labs Ordered and Resulted from Time of ED Arrival to Time of ED Departure   COMPREHENSIVE METABOLIC PANEL - Abnormal       Result Value    Sodium 136      Potassium 4.8      Chloride 98      Carbon Dioxide (CO2) 26      Anion Gap 12      Urea Nitrogen 22.6 (*)     Creatinine 1.09      Calcium 9.8      Glucose 351 (*)     Alkaline Phosphatase 76      AST 74 (*)      (*)     Protein Total 7.3      Albumin 4.6      Bilirubin Total 0.5      GFR Estimate 86     ROUTINE UA WITH MICROSCOPIC REFLEX TO CULTURE - Abnormal    Color Urine Yellow      Appearance Urine Clear      Glucose Urine >=1000 (*)     Bilirubin Urine Negative      Ketones Urine Trace (*)     Specific Gravity Urine 1.020      Blood Urine Negative      pH Urine 6.0      Protein Albumin Urine 20 (*)     Urobilinogen Urine Normal      Nitrite Urine Negative      Leukocyte Esterase Urine Negative      Mucus Urine Present (*)     RBC Urine 0      WBC Urine <1      Squamous Epithelials Urine <1     GLUCOSE BY METER - Abnormal    GLUCOSE BY METER POCT 325 (*)    INFLUENZA A/B, RSV, & SARS-COV2 PCR - Abnormal    Influenza A PCR Negative      Influenza B PCR Negative      RSV PCR Negative      SARS CoV2 PCR Positive (*)    GLUCOSE BY METER - Abnormal    GLUCOSE BY METER POCT 228 (*)     LIPASE - Normal    Lipase 43     CBC WITH PLATELETS AND DIFFERENTIAL    WBC Count 7.5      RBC Count 5.35      Hemoglobin 16.3      Hematocrit 47.5      MCV 89      MCH 30.5      MCHC 34.3      RDW 12.7      Platelet Count 216      % Neutrophils 68      % Lymphocytes 20      % Monocytes 9      % Eosinophils 2      % Basophils 1      % Immature Granulocytes 0      NRBCs per 100 WBC 0      Absolute Neutrophils 5.2      Absolute Lymphocytes 1.5      Absolute Monocytes 0.7      Absolute Eosinophils 0.1      Absolute Basophils 0.0      Absolute Immature Granulocytes 0.0      Absolute NRBCs 0.0        Emergency Department Course & Assessments:    Interventions:  Medications   sodium chloride 0.9% BOLUS 1,000 mL (0 mLs Intravenous Stopped 9/24/23 2148)   insulin glargine (LANTUS PEN) injection 10 Units (10 Units Subcutaneous $Given 9/24/23 2243)   metFORMIN (GLUCOPHAGE) tablet 1,000 mg (1,000 mg Oral $Given 9/24/23 2217)   glipiZIDE (GLUCOTROL) tablet 10 mg (10 mg Oral $Given 9/24/23 2242)      Independent Interpretation (X-rays, CTs, rhythm strip):  None    Assessments/Consultations/Discussion of Management or Tests:  ED Course as of 09/25/23 0005   Sun Sep 24, 2023   2139 I obtained the patient's history and examined as noted above.      Social Determinants of Health affecting care:   None    Disposition:  The patient was discharged to home.     Impression & Plan      Medical Decision Making:  Girma Trejo is a 43 year old male who presents for evaluation of elevated blood sugar.  By blood work there is no signs of DKA, no clinical features to suggest hyperosmolar issues. Based on history of medication and compliance, I would administer his home medications at this time.     Discussed in detail with patient and they agree.  [place] primary endo in [place] asap.  Counseled on long-term risks of hyperglycemia including but not limited to renal failure, MI, and premature death.  In addition, he notes he may test  positive as he had COVID-19 this month.  All questions and concerns were answered. The patient was discharged home and recommended to follow up with his primary physician and return with any new or worsening symptoms.      Diagnosis:    ICD-10-CM    1. Type 2 diabetes mellitus with hyperglycemia, with long-term current use of insulin (H)  E11.65     Z79.4       2. COVID-19 virus infection  U07.1          Scribe Disclosure:  I, Yadi Deleon, am serving as a scribe at 5:08 PM on 9/24/2023 to document services personally performed by Ganesh Gupta MD based on my observations and the provider's statements to me.      9/24/2023   Ganesh Gupta MD Stevens, Andrew C, MD  09/25/23 0046

## 2024-11-17 NOTE — PLAN OF CARE
Observation goals:  List all goals to be met before discharge home:   - Tolerating oral antibiotics or has plans for home infusion set up.No,still on IV antibiotics.,   - Vital signs normal or at patient baseline, :Yes  - Adequate pain control on oral analgesia,No,pain managed with IV toradol   - Infection is improving, No  - Color, warmth, movement, sensation of affected area or limb is intact or at baseline, :Not at baseline  - Return to baseline functional status, :No  - Safe disposition plan has been identified,:Not yet identified.  [place] facial CT done afternoon,result pending.  - Nurse to notify provider when observation goals have been met and patient is ready for discharge   Yes